# Patient Record
Sex: MALE | Race: WHITE | NOT HISPANIC OR LATINO | Employment: FULL TIME | URBAN - METROPOLITAN AREA
[De-identification: names, ages, dates, MRNs, and addresses within clinical notes are randomized per-mention and may not be internally consistent; named-entity substitution may affect disease eponyms.]

---

## 2017-01-20 ENCOUNTER — TRANSFERRED RECORDS (OUTPATIENT)
Dept: HEALTH INFORMATION MANAGEMENT | Facility: CLINIC | Age: 27
End: 2017-01-20

## 2017-02-16 ENCOUNTER — TRANSFERRED RECORDS (OUTPATIENT)
Dept: HEALTH INFORMATION MANAGEMENT | Facility: CLINIC | Age: 27
End: 2017-02-16

## 2017-03-12 ENCOUNTER — HOSPITAL ENCOUNTER (EMERGENCY)
Facility: CLINIC | Age: 27
Discharge: HOME | End: 2017-03-12
Attending: EMERGENCY MEDICINE | Admitting: EMERGENCY MEDICINE
Payer: COMMERCIAL

## 2017-03-12 ENCOUNTER — APPOINTMENT (OUTPATIENT)
Dept: CT IMAGING | Facility: CLINIC | Age: 27
End: 2017-03-12
Attending: EMERGENCY MEDICINE
Payer: COMMERCIAL

## 2017-03-12 ENCOUNTER — APPOINTMENT (OUTPATIENT)
Dept: GENERAL RADIOLOGY | Facility: CLINIC | Age: 27
End: 2017-03-12
Attending: EMERGENCY MEDICINE
Payer: COMMERCIAL

## 2017-03-12 VITALS
SYSTOLIC BLOOD PRESSURE: 141 MMHG | RESPIRATION RATE: 16 BRPM | HEIGHT: 73 IN | TEMPERATURE: 99.6 F | WEIGHT: 168 LBS | DIASTOLIC BLOOD PRESSURE: 74 MMHG | BODY MASS INDEX: 22.26 KG/M2 | OXYGEN SATURATION: 98 % | HEART RATE: 87 BPM

## 2017-03-12 DIAGNOSIS — R07.89 CHEST DISCOMFORT: ICD-10-CM

## 2017-03-12 DIAGNOSIS — R51.9 ACUTE NONINTRACTABLE HEADACHE, UNSPECIFIED HEADACHE TYPE: ICD-10-CM

## 2017-03-12 DIAGNOSIS — M43.6 STIFF NECK: ICD-10-CM

## 2017-03-12 LAB
ALBUMIN SERPL-MCNC: 4 G/DL (ref 3.4–5)
ALBUMIN UR-MCNC: 30 MG/DL
ALP SERPL-CCNC: 86 U/L (ref 40–150)
ALT SERPL W P-5'-P-CCNC: 24 U/L (ref 0–70)
ANION GAP SERPL CALCULATED.3IONS-SCNC: 10 MMOL/L (ref 3–14)
APPEARANCE UR: CLEAR
AST SERPL W P-5'-P-CCNC: 11 U/L (ref 0–45)
BASOPHILS # BLD AUTO: 0 10E9/L (ref 0–0.2)
BASOPHILS NFR BLD AUTO: 0.9 %
BILIRUB SERPL-MCNC: 0.6 MG/DL (ref 0.2–1.3)
BILIRUB UR QL STRIP: NEGATIVE
BUN SERPL-MCNC: 13 MG/DL (ref 7–30)
CALCIUM SERPL-MCNC: 9.4 MG/DL (ref 8.5–10.1)
CHLORIDE SERPL-SCNC: 100 MMOL/L (ref 94–109)
CO2 SERPL-SCNC: 28 MMOL/L (ref 20–32)
COLOR UR AUTO: YELLOW
CREAT SERPL-MCNC: 0.99 MG/DL (ref 0.66–1.25)
D DIMER PPP FEU-MCNC: 1.2 UG/ML FEU (ref 0–0.5)
DIFFERENTIAL METHOD BLD: NORMAL
EOSINOPHIL # BLD AUTO: 0.1 10E9/L (ref 0–0.7)
EOSINOPHIL NFR BLD AUTO: 1.7 %
ERYTHROCYTE [DISTWIDTH] IN BLOOD BY AUTOMATED COUNT: 12.3 % (ref 10–15)
FLUAV+FLUBV AG SPEC QL: NEGATIVE
FLUAV+FLUBV AG SPEC QL: NORMAL
GFR SERPL CREATININE-BSD FRML MDRD: ABNORMAL ML/MIN/1.7M2
GLUCOSE SERPL-MCNC: 78 MG/DL (ref 70–99)
GLUCOSE UR STRIP-MCNC: NEGATIVE MG/DL
HCT VFR BLD AUTO: 43.1 % (ref 40–53)
HGB BLD-MCNC: 14.8 G/DL (ref 13.3–17.7)
HGB UR QL STRIP: NEGATIVE
INTERPRETATION ECG - MUSE: NORMAL
KETONES UR STRIP-MCNC: 40 MG/DL
LACTATE BLD-SCNC: 1.2 MMOL/L (ref 0.7–2.1)
LEUKOCYTE ESTERASE UR QL STRIP: NEGATIVE
LYMPHOCYTES # BLD AUTO: 1.2 10E9/L (ref 0.8–5.3)
LYMPHOCYTES NFR BLD AUTO: 24.3 %
MCH RBC QN AUTO: 29.7 PG (ref 26.5–33)
MCHC RBC AUTO-ENTMCNC: 34.3 G/DL (ref 31.5–36.5)
MCV RBC AUTO: 86 FL (ref 78–100)
MONOCYTES # BLD AUTO: 1 10E9/L (ref 0–1.3)
MONOCYTES NFR BLD AUTO: 19.1 %
MUCOUS THREADS #/AREA URNS LPF: PRESENT /LPF
NEUTROPHILS # BLD AUTO: 2.8 10E9/L (ref 1.6–8.3)
NEUTROPHILS NFR BLD AUTO: 54 %
NITRATE UR QL: NEGATIVE
PH UR STRIP: 6 PH (ref 5–7)
PLATELET # BLD AUTO: 227 10E9/L (ref 150–450)
PLATELET # BLD EST: NORMAL 10*3/UL
POTASSIUM SERPL-SCNC: 3.2 MMOL/L (ref 3.4–5.3)
PROT SERPL-MCNC: 8.6 G/DL (ref 6.8–8.8)
RBC # BLD AUTO: 4.99 10E12/L (ref 4.4–5.9)
RBC #/AREA URNS AUTO: <1 /HPF (ref 0–2)
RBC MORPH BLD: NORMAL
SODIUM SERPL-SCNC: 138 MMOL/L (ref 133–144)
SP GR UR STRIP: 1.03 (ref 1–1.03)
SPECIMEN SOURCE: NORMAL
URN SPEC COLLECT METH UR: ABNORMAL
UROBILINOGEN UR STRIP-MCNC: NORMAL MG/DL (ref 0–2)
WBC # BLD AUTO: 5.1 10E9/L (ref 4–11)
WBC #/AREA URNS AUTO: <1 /HPF (ref 0–2)

## 2017-03-12 PROCEDURE — 25000132 ZZH RX MED GY IP 250 OP 250 PS 637: Performed by: EMERGENCY MEDICINE

## 2017-03-12 PROCEDURE — 71260 CT THORAX DX C+: CPT

## 2017-03-12 PROCEDURE — 87804 INFLUENZA ASSAY W/OPTIC: CPT | Mod: 91 | Performed by: EMERGENCY MEDICINE

## 2017-03-12 PROCEDURE — 93010 ELECTROCARDIOGRAM REPORT: CPT | Mod: Z6 | Performed by: EMERGENCY MEDICINE

## 2017-03-12 PROCEDURE — 96360 HYDRATION IV INFUSION INIT: CPT | Performed by: EMERGENCY MEDICINE

## 2017-03-12 PROCEDURE — 80053 COMPREHEN METABOLIC PANEL: CPT | Performed by: EMERGENCY MEDICINE

## 2017-03-12 PROCEDURE — 99285 EMERGENCY DEPT VISIT HI MDM: CPT | Mod: 25 | Performed by: EMERGENCY MEDICINE

## 2017-03-12 PROCEDURE — 93005 ELECTROCARDIOGRAM TRACING: CPT | Performed by: EMERGENCY MEDICINE

## 2017-03-12 PROCEDURE — 71020 XR CHEST 2 VW: CPT

## 2017-03-12 PROCEDURE — 85379 FIBRIN DEGRADATION QUANT: CPT | Performed by: EMERGENCY MEDICINE

## 2017-03-12 PROCEDURE — 96361 HYDRATE IV INFUSION ADD-ON: CPT | Performed by: EMERGENCY MEDICINE

## 2017-03-12 PROCEDURE — 83605 ASSAY OF LACTIC ACID: CPT | Performed by: EMERGENCY MEDICINE

## 2017-03-12 PROCEDURE — 81001 URINALYSIS AUTO W/SCOPE: CPT | Performed by: EMERGENCY MEDICINE

## 2017-03-12 PROCEDURE — 25500064 ZZH RX 255 OP 636: Performed by: EMERGENCY MEDICINE

## 2017-03-12 PROCEDURE — 25000128 H RX IP 250 OP 636: Performed by: EMERGENCY MEDICINE

## 2017-03-12 PROCEDURE — 85025 COMPLETE CBC W/AUTO DIFF WBC: CPT | Performed by: EMERGENCY MEDICINE

## 2017-03-12 PROCEDURE — 96374 THER/PROPH/DIAG INJ IV PUSH: CPT | Mod: 59 | Performed by: EMERGENCY MEDICINE

## 2017-03-12 RX ORDER — ONDANSETRON 2 MG/ML
4 INJECTION INTRAMUSCULAR; INTRAVENOUS ONCE
Status: COMPLETED | OUTPATIENT
Start: 2017-03-12 | End: 2017-03-12

## 2017-03-12 RX ORDER — SODIUM CHLORIDE 9 MG/ML
INJECTION, SOLUTION INTRAVENOUS ONCE
Status: COMPLETED | OUTPATIENT
Start: 2017-03-12 | End: 2017-03-12

## 2017-03-12 RX ORDER — IOPAMIDOL 755 MG/ML
59 INJECTION, SOLUTION INTRAVASCULAR ONCE
Status: COMPLETED | OUTPATIENT
Start: 2017-03-12 | End: 2017-03-12

## 2017-03-12 RX ORDER — POTASSIUM CHLORIDE 20MEQ/15ML
60 LIQUID (ML) ORAL ONCE
Status: COMPLETED | OUTPATIENT
Start: 2017-03-12 | End: 2017-03-12

## 2017-03-12 RX ORDER — ONDANSETRON 2 MG/ML
4 INJECTION INTRAMUSCULAR; INTRAVENOUS
Status: COMPLETED | OUTPATIENT
Start: 2017-03-12 | End: 2017-03-12

## 2017-03-12 RX ORDER — IOPAMIDOL 755 MG/ML
60 INJECTION, SOLUTION INTRAVASCULAR ONCE
Status: DISCONTINUED | OUTPATIENT
Start: 2017-03-12 | End: 2017-03-13 | Stop reason: HOSPADM

## 2017-03-12 RX ORDER — ONDANSETRON 4 MG/1
4 TABLET, ORALLY DISINTEGRATING ORAL EVERY 8 HOURS PRN
Qty: 10 TABLET | Refills: 0 | Status: SHIPPED | OUTPATIENT
Start: 2017-03-12 | End: 2017-03-15

## 2017-03-12 RX ADMIN — SODIUM CHLORIDE, PRESERVATIVE FREE 90 ML: 5 INJECTION INTRAVENOUS at 21:21

## 2017-03-12 RX ADMIN — ONDANSETRON 4 MG: 2 INJECTION INTRAMUSCULAR; INTRAVENOUS at 14:29

## 2017-03-12 RX ADMIN — ONDANSETRON 4 MG: 2 INJECTION INTRAMUSCULAR; INTRAVENOUS at 18:39

## 2017-03-12 RX ADMIN — IOPAMIDOL 59 ML: 755 INJECTION, SOLUTION INTRAVENOUS at 21:20

## 2017-03-12 RX ADMIN — SODIUM CHLORIDE 1000 ML: 9 INJECTION, SOLUTION INTRAVENOUS at 18:30

## 2017-03-12 RX ADMIN — SODIUM CHLORIDE 1000 ML: 9 INJECTION, SOLUTION INTRAVENOUS at 14:29

## 2017-03-12 RX ADMIN — Medication 60 MEQ: at 18:30

## 2017-03-12 ASSESSMENT — ENCOUNTER SYMPTOMS
CHILLS: 1
NAUSEA: 1
APPETITE CHANGE: 1
NECK STIFFNESS: 1
CHEST TIGHTNESS: 1
HEADACHES: 1
DIAPHORESIS: 1
VOMITING: 0

## 2017-03-12 NOTE — ED NOTES
Alert orientated ambulatory patient presents to ER triage with c/o:      1.) Stiff neck - onset three days ago  2.) Chest pain - onset two days ago  3.) Headache/Nausea -onset two days ago.    Hx: Recent dx of Ulcerative Colitis.  Stated new medication.      Airway WDLs  Breathing WDLs  Circulation WDLs

## 2017-03-12 NOTE — ED PROVIDER NOTES
History     Chief Complaint   Patient presents with     Generalized Body Aches     Torticollis     HPI  Kyle Coelho is a 26 year old male who presents to the Emergency Department with multiple complaints. Patient states that 4 days ago his neck felt stiff. He reports describes his stiffness as an inability to turn his head or move his shoulders. He reports a sore jaw from when he tried to eat food and had to chew without moving his neck. Patient reports that he then developed chest discomfort, headache, loss of appetite, and nausea. He states that his headache was worse this morning and was throbbing. He states that he thought he was going to lose consciousness because of this headache. He did not lose consciousness. He describes the chest discomfort as a tightness when he takes deep breaths. Patient states that because of his nausea he has been unable to eat. He has not had any episodes of vomiting. The patient states that he has had chills and night sweats but has not taken his temperature at home. Patient states that last night he awoke at 1:45 AM and called for an emergency flight home.     He states that he has had abdominal pain but attributes this to recent diagnosis of ulcerative colitis 1 month ago. He states that he is on Lialda for this. He currently does not have abdominal pain. He denies dysuria. No ear pain. The patient has not tried any medication for his symptoms.      I have reviewed the Medications, Allergies, Past Medical and Surgical History, and Social History in the [facility] system.    PAST MEDICAL HISTORY  History reviewed. No pertinent past medical history.  PAST SURGICAL HISTORY  History reviewed. No pertinent past surgical history.  FAMILY HISTORY  No family history on file.  SOCIAL HISTORY  Social History   Substance Use Topics     Smoking status: Never Smoker     Smokeless tobacco: Not on file     Alcohol use No     MEDICATIONS  Current Facility-Administered Medications   Medication  "    0.9% sodium chloride BOLUS    Followed by     0.9% sodium chloride BOLUS     iopamidol (ISOVUE-370) solution 60 mL     Current Outpatient Prescriptions   Medication     Mesalamine (LIALDA PO)     ondansetron (ZOFRAN ODT) 4 MG ODT tab     ALLERGIES  No Known Allergies     Review of Systems   Constitutional: Positive for appetite change (decreased), chills and diaphoresis (night).   HENT: Negative for ear pain.    Respiratory: Positive for chest tightness.    Gastrointestinal: Positive for nausea. Negative for vomiting.   Musculoskeletal: Positive for neck stiffness.   Neurological: Positive for headaches.   All other systems reviewed and are negative.      Physical Exam   BP: (!) 139/97  Heart Rate: 96  Temp: 98.5  F (36.9  C)  Height: 185.4 cm (6' 1\")  Weight: 76.2 kg (168 lb)  SpO2: 98 %  Physical Exam   Constitutional: He appears well-developed and well-nourished. No distress.   Eyes: Right eye exhibits no discharge. Left eye exhibits no discharge.   Neck: Normal range of motion. Neck supple. No tracheal tenderness present. No rigidity. Normal range of motion present. No thyroid mass present.   Cardiovascular: Normal rate.    No murmur heard.  Pulmonary/Chest: Effort normal. No stridor. No respiratory distress. He has no wheezes. He exhibits no tenderness.   Abdominal: Soft. He exhibits no distension. There is no tenderness. There is no rebound.   Musculoskeletal: He exhibits no edema.   Neurological: He is alert. No cranial nerve deficit.   Skin: Skin is warm. He is not diaphoretic. No erythema.   Psychiatric: He has a normal mood and affect.       ED Course     Procedures         EKG Interpretation:      Interpreted by Jerica Dixon  Time reviewed: 1456  Symptoms at time of EKG: chest discomfort   Rhythm: normal sinus   Rate: normal  Axis: normal  ST Segments/ T Waves: Non-specific ST wave changes  Comparison to prior: nonr    Clinical Impression: non specific ST changes      Results for orders placed or " performed during the hospital encounter of 03/12/17 (from the past 24 hour(s))   CBC with platelets differential   Result Value Ref Range    WBC 5.1 4.0 - 11.0 10e9/L    RBC Count 4.99 4.4 - 5.9 10e12/L    Hemoglobin 14.8 13.3 - 17.7 g/dL    Hematocrit 43.1 40.0 - 53.0 %    MCV 86 78 - 100 fl    MCH 29.7 26.5 - 33.0 pg    MCHC 34.3 31.5 - 36.5 g/dL    RDW 12.3 10.0 - 15.0 %    Platelet Count 227 150 - 450 10e9/L    Diff Method Manual Differential     % Neutrophils 54.0 %    % Lymphocytes 24.3 %    % Monocytes 19.1 %    % Eosinophils 1.7 %    % Basophils 0.9 %    Absolute Neutrophil 2.8 1.6 - 8.3 10e9/L    Absolute Lymphocytes 1.2 0.8 - 5.3 10e9/L    Absolute Monocytes 1.0 0.0 - 1.3 10e9/L    Absolute Eosinophils 0.1 0.0 - 0.7 10e9/L    Absolute Basophils 0.0 0.0 - 0.2 10e9/L    RBC Morphology Normal     Platelet Estimate Confirming automated cell count    Comprehensive metabolic panel   Result Value Ref Range    Sodium 138 133 - 144 mmol/L    Potassium 3.2 (L) 3.4 - 5.3 mmol/L    Chloride 100 94 - 109 mmol/L    Carbon Dioxide 28 20 - 32 mmol/L    Anion Gap 10 3 - 14 mmol/L    Glucose 78 70 - 99 mg/dL    Urea Nitrogen 13 7 - 30 mg/dL    Creatinine 0.99 0.66 - 1.25 mg/dL    GFR Estimate >90  Non  GFR Calc   >60 mL/min/1.7m2    GFR Estimate If Black >90   GFR Calc   >60 mL/min/1.7m2    Calcium 9.4 8.5 - 10.1 mg/dL    Bilirubin Total 0.6 0.2 - 1.3 mg/dL    Albumin 4.0 3.4 - 5.0 g/dL    Protein Total 8.6 6.8 - 8.8 g/dL    Alkaline Phosphatase 86 40 - 150 U/L    ALT 24 0 - 70 U/L    AST 11 0 - 45 U/L   Lactic acid whole blood   Result Value Ref Range    Lactic Acid 1.2 0.7 - 2.1 mmol/L   D dimer quantitative   Result Value Ref Range    D Dimer 1.2 (H) 0.0 - 0.50 ug/ml FEU   Influenza A/B antigen swab   Result Value Ref Range    Influenza A/B Agn Specimen Nasopharyngeal     Influenza A Negative NEG    Influenza B  NEG     Negative   Test results must be correlated with clinical data. If  necessary, results   should be confirmed by a molecular assay or viral culture.     XR Chest 2 Views    Narrative    XR CHEST 2 VW  3/12/2017 2:50 PM      HISTORY: fever, diarrhea    COMPARISON: None available    FINDINGS:   The lungs and pleural spaces are clear. No focal airspace opacity. No  pleural effusion. The central airway is clear. No pneumothorax.     The heart and pulmonary vascularity appear unremarkable.    No acute bony abnormality.       Impression    IMPRESSION:   No acute cardiopulmonary abnormality.     I have personally reviewed the examination and initial interpretation  and I agree with the findings.    JEMIMA HERNANDEZ MD   EKG 12-lead, tracing only   Result Value Ref Range    Interpretation ECG Click View Image link to view waveform and result    Routine UA with microscopic   Result Value Ref Range    Color Urine Yellow     Appearance Urine Clear     Glucose Urine Negative NEG mg/dL    Bilirubin Urine Negative NEG    Ketones Urine 40 (A) NEG mg/dL    Specific Gravity Urine 1.031 1.003 - 1.035    Blood Urine Negative NEG    pH Urine 6.0 5.0 - 7.0 pH    Protein Albumin Urine 30 (A) NEG mg/dL    Urobilinogen mg/dL Normal 0.0 - 2.0 mg/dL    Nitrite Urine Negative NEG    Leukocyte Esterase Urine Negative NEG    Source Midstream Urine     WBC Urine <1 0 - 2 /HPF    RBC Urine <1 0 - 2 /HPF    Mucous Urine Present (A) NEG /LPF   Chest CT, IV contrast only - PE protocol    Narrative    Resident prelim:   1. No pulmonary embolus.  2. Several scattered groundglass nodules throughout the lungs which  are nonspecific though may be seen in the setting of infection or  inflammation.  3. Gynecomastia.       Assessments & Plan (with Medical Decision Making)   This is a 26 year old gentleman who is presenting to the ED with neck stiffness, headaches, and chest discomfort. In the setting of the headache, the patient is afebrile rectally and has had not received any ibuprofen or Tylenol (per him); so, my  concern for meningitis is very low. His labs are without any abnormalities other than his potassium which is low and will be treated.  His neck stiffness and jaw has improved per him. He reports that it feels much better now. ROM intact. He is able to eat ok. His tetanus is also up to date.  He does not have any headaches now.  Liver enzymes without concerns.    In the setting of chest discomfort; an EKG is obtained without major findings. D-dimer is obtained in this low risk patient and is positive: CT ordered.    Signing out pending the CT of the chest.  I have discuss this with the patient:   -If CT is negative: d/c home with Zofran for nausea.    Addendum: CT negative: d/c home with PCP follow up and strict return parameters.    We discussed the possibility that this could be related to the side effects of the Mesalamine. He has an appointment with his gastroenterologist this week and he will discuss this.    I have reviewed the nursing notes.    I have reviewed the findings, diagnosis, plan and need for follow up with the patient.  This part of the document was transcribed by Nehal Zamorano Medical Scribe.   New Prescriptions    ONDANSETRON (ZOFRAN ODT) 4 MG ODT TAB    Take 1 tablet (4 mg) by mouth every 8 hours as needed for nausea       Final diagnoses:   Stiff neck   Acute nonintractable headache, unspecified headache type   Chest discomfort     I, Marco Abel, am serving as a trained medical scribe to document services personally performed by Jerica Dixon MD, based on the provider's statements to me.      Jerica MINER MD, was physically present and have reviewed and verified the accuracy of this note documented by Marco Abel.       3/12/2017   [facility], [place], EMERGENCY DEPARTMENT         Jerica Dixon MD  03/12/17 6798

## 2017-03-12 NOTE — ED AVS SNAPSHOT
[facility], Emergency Department    [address] [address]    Phone:  [phone]                                       Kyle Coelho   MRN: [id number]    Department:  [facility], Emergency Department   Date of Visit:  3/12/2017           Patient Information     Date Of Birth          1990        Your diagnoses for this visit were:     Stiff neck     Acute nonintractable headache, unspecified headache type     Chest discomfort        You were seen by Jerica Dixon MD.      Follow-up Information     Follow up with Gabriel Ibrahim MD In 3 days.    Specialty:  Gastroenterology    Why:  As needed, If symptoms worsen    Contact information:    [facility]  [address] [place] [address] [phone]          Follow up with [facility], Emergency Department.    Specialty:  EMERGENCY MEDICINE    Why:  As needed, If symptoms worsen    Contact information:    [address] [address] [phone]    Additional information:    The [facility] is located on the corner of [address] and [address] on the [address]. It is easily accessible from virtually any point in the [place] area, via [employer] and [facility].      24 Hour Appointment Hotline       To make an appointment at any [facility], call [phone] ([phone]). If you don't have a family doctor or clinic, we will help you find one. [facility] are conveniently located to serve the needs of you and your family.             Review of your medicines      START taking        Dose / Directions Last dose taken    ondansetron 4 MG ODT tab   Commonly known as:  ZOFRAN ODT   Dose:  4 mg   Quantity:  10 tablet        Take 1 tablet (4 mg) by mouth every 8 hours as needed for nausea   Refills:  0          Our records show that you are taking the medicines listed below. If these are incorrect, please call your family doctor or clinic.      "   Dose / Directions Last dose taken    LIALDA PO   Dose:  1.2 mg        Take 1.2 mg by mouth   Refills:  0                Prescriptions were sent or printed at these locations (1 Prescription)                   Other Prescriptions                Printed at Department/Unit printer (1 of 1)         ondansetron (ZOFRAN ODT) 4 MG ODT tab                Procedures and tests performed during your visit     CBC with platelets differential    Chest CT, IV contrast only - PE protocol    Comprehensive metabolic panel    D dimer quantitative    EKG 12-lead, tracing only    Influenza A/B antigen swab    Lactic acid whole blood    Peripheral IV: Standard    Routine UA with microscopic    XR Chest 2 Views      Orders Needing Specimen Collection     None      Pending Results     Date and Time Order Name Status Description    3/12/2017 1918 Chest CT, IV contrast only - PE protocol Preliminary     3/12/2017 1422 EKG 12-lead, tracing only Preliminary             Pending Culture Results     No orders found from 3/10/2017 to 3/13/2017.            Thank you for choosing [facility]       Thank you for choosing [facility] for your care. Our goal is always to provide you with excellent care. Hearing back from our patients is one way we can continue to improve our services. Please take a few minutes to complete the written survey that you may receive in the mail after you visit with us. Thank you!        [employer] Information     [employer] lets you send messages to your doctor, view your test results, renew your prescriptions, schedule appointments and more. To sign up, go to [email or web address] . Click on \"Log in\" on the left side of the screen, which will take you to the Welcome page. Then click on \"Sign up Now\" on the right side of the page.     You will be asked to enter the access code listed below, as well as some personal information. Please follow the directions to create your username and password.     Your access code is: " [email or web address]  Expires: 6/10/2017 10:03 PM     Your access code will  in 90 days. If you need help or a new code, please call your [facility] or [phone].        Care EveryWhere ID     This is your Care EveryWhere ID. This could be used by other organizations to access your [facility] medical records  [id number]        After Visit Summary       This is your record. Keep this with you and show to your community pharmacist(s) and doctor(s) at your next visit.

## 2017-03-12 NOTE — ED AVS SNAPSHOT
[facility], [place], Emergency Department    [address] [address]    Phone:  [phone]                                       Kyle Coelho   MRN: [id number]    Department:  [facility], Emergency Department   Date of Visit:  3/12/2017           After Visit Summary Signature Page     I have received my discharge instructions, and my questions have been answered. I have discussed any challenges I see with this plan with the nurse or doctor.    ..........................................................................................................................................  Patient/Patient Representative Signature      ..........................................................................................................................................  Patient Representative Print Name and Relationship to Patient    ..................................................               ................................................  Date                                            Time    ..........................................................................................................................................  Reviewed by Signature/Title    ...................................................              ..............................................  Date                                                            Time

## 2017-03-17 ENCOUNTER — TRANSFERRED RECORDS (OUTPATIENT)
Dept: HEALTH INFORMATION MANAGEMENT | Facility: CLINIC | Age: 27
End: 2017-03-17

## 2017-04-17 ENCOUNTER — TRANSFERRED RECORDS (OUTPATIENT)
Dept: HEALTH INFORMATION MANAGEMENT | Facility: CLINIC | Age: 27
End: 2017-04-17

## 2017-04-21 ENCOUNTER — TRANSFERRED RECORDS (OUTPATIENT)
Dept: HEALTH INFORMATION MANAGEMENT | Facility: CLINIC | Age: 27
End: 2017-04-21

## 2017-06-08 ENCOUNTER — TRANSFERRED RECORDS (OUTPATIENT)
Dept: HEALTH INFORMATION MANAGEMENT | Facility: CLINIC | Age: 27
End: 2017-06-08

## 2017-10-16 NOTE — TELEPHONE ENCOUNTER
"APPT INFORMATION    Date /Time: 10/20/17 7:30AM    Reason for Appt: UC    Ref Provider/Clinic: JORGE ALTAMIRANO, JAYLA MCARE    Patient Contact (Y/N) & Call Details: No pt was referred.    Action: None     RECORDS CLINIC NAME  (\"None\" if no records ) RECEIVED RECS & IMG? Y/N   (may include other helpful notes)   Internal Clinics: None     External Clinics: JAYLA Ibrahim MD  Transferred recs scanned in Epic 1/2017- 6/8/2017  Colonoscopy & Upper EGD 2/16/17        "

## 2017-10-17 ENCOUNTER — TELEPHONE (OUTPATIENT)
Dept: GASTROENTEROLOGY | Facility: CLINIC | Age: 27
End: 2017-10-17

## 2017-10-20 ENCOUNTER — PRE VISIT (OUTPATIENT)
Dept: GASTROENTEROLOGY | Facility: CLINIC | Age: 27
End: 2017-10-20

## 2017-10-20 ENCOUNTER — OFFICE VISIT (OUTPATIENT)
Dept: GASTROENTEROLOGY | Facility: CLINIC | Age: 27
End: 2017-10-20

## 2017-10-20 VITALS
SYSTOLIC BLOOD PRESSURE: 136 MMHG | WEIGHT: 191.6 LBS | HEART RATE: 69 BPM | DIASTOLIC BLOOD PRESSURE: 79 MMHG | BODY MASS INDEX: 25.39 KG/M2 | OXYGEN SATURATION: 98 % | HEIGHT: 73 IN | TEMPERATURE: 98.1 F

## 2017-10-20 DIAGNOSIS — K51.311 CHRONIC ULCERATIVE RECTOSIGMOIDITIS WITH RECTAL BLEEDING (H): ICD-10-CM

## 2017-10-20 DIAGNOSIS — K51.311 ULCERATIVE RECTOSIGMOIDITIS WITH RECTAL BLEEDING (H): Primary | ICD-10-CM

## 2017-10-20 LAB
ALBUMIN SERPL-MCNC: 3.7 G/DL (ref 3.4–5)
ALP SERPL-CCNC: 68 U/L (ref 40–150)
ALT SERPL W P-5'-P-CCNC: 21 U/L (ref 0–70)
AST SERPL W P-5'-P-CCNC: 11 U/L (ref 0–45)
BASOPHILS # BLD AUTO: 0 10E9/L (ref 0–0.2)
BASOPHILS NFR BLD AUTO: 0.6 %
BILIRUB DIRECT SERPL-MCNC: 0.1 MG/DL (ref 0–0.2)
BILIRUB SERPL-MCNC: 0.3 MG/DL (ref 0.2–1.3)
CRP SERPL-MCNC: 6.6 MG/L (ref 0–8)
DIFFERENTIAL METHOD BLD: ABNORMAL
EOSINOPHIL # BLD AUTO: 0.2 10E9/L (ref 0–0.7)
EOSINOPHIL NFR BLD AUTO: 3.1 %
ERYTHROCYTE [DISTWIDTH] IN BLOOD BY AUTOMATED COUNT: 11.8 % (ref 10–15)
ERYTHROCYTE [SEDIMENTATION RATE] IN BLOOD BY WESTERGREN METHOD: 7 MM/H (ref 0–15)
HCT VFR BLD AUTO: 39.4 % (ref 40–53)
HGB BLD-MCNC: 13.1 G/DL (ref 13.3–17.7)
IMM GRANULOCYTES # BLD: 0 10E9/L (ref 0–0.4)
IMM GRANULOCYTES NFR BLD: 0.2 %
LYMPHOCYTES # BLD AUTO: 1.1 10E9/L (ref 0.8–5.3)
LYMPHOCYTES NFR BLD AUTO: 19.5 %
MCH RBC QN AUTO: 30.2 PG (ref 26.5–33)
MCHC RBC AUTO-ENTMCNC: 33.2 G/DL (ref 31.5–36.5)
MCV RBC AUTO: 91 FL (ref 78–100)
MONOCYTES # BLD AUTO: 0.5 10E9/L (ref 0–1.3)
MONOCYTES NFR BLD AUTO: 9 %
NEUTROPHILS # BLD AUTO: 3.7 10E9/L (ref 1.6–8.3)
NEUTROPHILS NFR BLD AUTO: 67.6 %
NRBC # BLD AUTO: 0 10*3/UL
NRBC BLD AUTO-RTO: 0 /100
PLATELET # BLD AUTO: 241 10E9/L (ref 150–450)
PROT SERPL-MCNC: 7 G/DL (ref 6.8–8.8)
RBC # BLD AUTO: 4.34 10E12/L (ref 4.4–5.9)
WBC # BLD AUTO: 5.4 10E9/L (ref 4–11)

## 2017-10-20 RX ORDER — SULFASALAZINE 500 MG/1
500 TABLET ORAL 4 TIMES DAILY
COMMUNITY
End: 2018-06-12

## 2017-10-20 ASSESSMENT — ENCOUNTER SYMPTOMS
INCREASED ENERGY: 1
MUSCLE CRAMPS: 0
BACK PAIN: 1
ARTHRALGIAS: 1
JAUNDICE: 0
WEIGHT LOSS: 0
POLYPHAGIA: 0
MUSCLE WEAKNESS: 1
BOWEL INCONTINENCE: 0
FLANK PAIN: 0
BLOOD IN STOOL: 1
VOMITING: 0
RECTAL BLEEDING: 1
FEVER: 0
DECREASED APPETITE: 0
STIFFNESS: 0
RECTAL PAIN: 1
DIARRHEA: 1
BLOATING: 1
FATIGUE: 1
HALLUCINATIONS: 0
CONSTIPATION: 1
CHILLS: 0
DYSURIA: 0
NAUSEA: 0
ABDOMINAL PAIN: 1
DIFFICULTY URINATING: 0
POLYDIPSIA: 0
ALTERED TEMPERATURE REGULATION: 0
NECK PAIN: 0
JOINT SWELLING: 0
NIGHT SWEATS: 0
MYALGIAS: 1
HEMATURIA: 0
HEARTBURN: 0
WEIGHT GAIN: 0

## 2017-10-20 ASSESSMENT — PAIN SCALES - GENERAL: PAINLEVEL: NO PAIN (0)

## 2017-10-20 NOTE — LETTER
10/20/2017       RE: Kyle Coelho  [id number]  [facility] [address]     Dear Colleague,    Thank you for referring your patient, Kyle Coelho, to the [facility] GASTROENTEROLOGY AND IBD CLINIC at [facility]. Please see a copy of my visit note below.    IBD CLINIC VISIT     CC/REFERRING MD:  Self referred  REASON FOR FOLLOW UP: Ulcerative colitis  COLLABORATING PHYSICIAN: Gordo Escobedo MD    IBD HISTORY  Age at diagnosis: 26, 2017  Extent of disease: Pancolitis  Current UC medications: Prednisone 2.5mg daily  Prior UC surgeries: None  Prior IBD Medications:   Lialda - flu like illness with fevers, abdominal pains  Apriso - flu like illlness  Sulfasalazine - did not control symptoms    DRUG MONITORING  TPMT enzyme activity: Has not been obtained    6-TGN/6-MMPN levels: --    Biologic concentration:--    DISEASE ASSESSMENT  Labs:  Lab Results   Component Value Date    ALBUMIN 4.0 03/12/2017   Endoscopic assessment: Colonoscopy on 02/16/2017 revealed continuous erythema throughout the entire colon and patchy inflammation within the ileum.  Biopsies in the duodenum did not show any evidence of celiac.  Biopsies from the terminal ileum showed nonspecific acute ileitis presumed to be backwash ileitis.  Random colon biopsies showed moderate active chronic colitis consistent with inflammatory bowel disease.   Enterography: --  Fecal calprotectin: --   C diff: Negative 10/2017  Maddox score: 8    ASSESSMENT/PLAN  A 27-year-old male with history of ulcerative pancolitis, currently on 0.5 mg of prednisone who is here to establish care in the Inflammatory Bowel Disease program.      1.  Ulcerative pancolitis.  The patient is clearly not symptomatically controlled on sulfasalazine and currently on his second prednisone taper.  At this time, we will start Uceris to see if patient tolerates this.  If the patient does not experience symptomatic relief, we will begin a  prednisone taper at 40 mg to be taken for 2 weeks and taper down to 30, 20, 15, 10, 5 and 5 every other day for 1 week.  We had a long discussion regarding maintenance medications, considering ulcerative pancolitis in a young male in addition to his current social situation, living in [place] until May and what may be most feasible and the safest option.  The patient will be reviewing the information that we discussed at today's visit to include infliximab, adalimumab and vedolizumab to be started in the near future  in addition to steroid therapy to get symptoms under better control.  The patient is currently favoring adalimumab self injections, as this allows for remote delivery when he is in [place] which I think is a reasonable option as I believe the patient would be reliable with adequate followup and proper laboratory surveillance.  The patient will be reviewing the information and will make a decision by next week regarding biologic therapies that we will be initiating.  The patient is concerned about insurance coverage; and ideally, he would like to start medication after the first of the year.  Given patient's significant symptoms, I would like to get maintenance medication on board as soon as possible at it may take some time for patient to respond; and ideally, I would like to keep the patient off steroids as soon as possible given that this is his second and even third prednisone taper if we need to go to prednisone if Uceris is not effective.   --  Start Uceris at 9 mg daily.   --  Laboratory studies today.   --  Stool studies when recently obtained in September, C. diff was negative.  We will defer stool studies today.   --  Pending biologic therapies, we will plan to enter adequate levels and for monitoring of any evidence of antibodies with drug monitoring.     2.  Joint pain.  This seems to be consistent with inflammatory bowel disease presenting as peripheral spondyloarthropathy.  As we get  better control of his inflammatory bowel disease, I think this will improve.  Other considerations would be autoimmune driven processes that we can evaluate if not improve once maintenance medications have been initiated.     3. IBD healthcare maintenance based on patients current medication:  Anti-TNF medication therapy vs Anti-integrin therapy  Vaccinations:  -- Influenza (every year): Last given 2017  -- TdaP (every 10 years): Last given 2015  -- Pneumococcal Pneumonia (once then every 5 years): Has not received  -- Yearly assessment for latent Tb (verbal screening and exam, PPD or QuantiFERON-Tb testing): Normal chest xray 3/12/17    One time confirmation of immunity or serologies:  -- Hepatitis A (serologies or immunizations): Hep A Ab, Total was nonreactive ([facility])  -- Hepatitis B (serologies or immunizations): immune by serologies ([facility])  -- Varicella: Had chickenpox as a child  -- MMR: Not documented  -- HPV (all aged 18-26): Not documented  -- Meningococcal meningitis (all patients at risk for meningitis): 2005   -- Due to the immunosuppression in this patient, I would not advise administration of live vaccines such as varicella/VZV, intranasal influenza, MMR, or yellow fever vaccine (if travelling).      Bone mineral density screening   -- Recommend all patients supplement with calcium and vitamin D  -- Given prior steroid use recommend DEXA if not already done    Cancer Screening:  Colon cancer screening:  Given pancolitis colonoscopy every 2-3 years recommended after 8 years of disease.  Dysplasia screening is recommended 2024.    Cervical cancer screening: N/A    Skin cancer screening: Annual visual exam of skin by dermatologist since patient is immunocompromised    Depression Screening:  -- Over the last month, have you felt down, depressed, or hopeless? No  -- Over the last month, have you felt little interest or pleasure doing things? No    Misc:  -- Avoid tobacco use  -- Avoid NSAIDs as there is  potentially a 25% chance of causing an IBD flare      RTC 4 weeks    Thank you for this consultation.  It was a pleasure to participate in the care of this patient; please contact us with any further questions.      Miguel Turner PA-C  Division of Gastroenterology, Hepatology and Nutrition  [facility]    HPI:   Kyle is a pleasant 27-year-old male who presents today with a history of ulcerative pancolitis for establishing care in the Inflammatory Bowel Disease program at the [facility].  The patient's history of symptoms regarding ulcerative colitis began in the fall of 2016.  At that time, he was out of the country in [place] for his job and noted more frequent stools and eventually realized that it had become more consistent with diarrhea, prompting evaluation by his primary care provider.  Stool studies were obtained that were negative; however, his diarrhea persisted.  Flagyl was started without improvement of symptoms.  Referral was made to [facility], and at that time, stool studies were again obtained, negative for infection and patient was scheduled for a colonoscopy due to ongoing symptoms.  Colonoscopy on 02/16/2017 revealed continuous erythema throughout the entire colon and patchy inflammation within the ileum.  Biopsies in the duodenum did not show any evidence of celiac.  Biopsies from the terminal ileum showed nonspecific acute ileitis presumed to be backwash ileitis.  Random colon biopsies showed moderate active chronic colitis consistent with inflammatory bowel disease.  He was started on Lialda 4 tablets daily beginning 02/18/2016.  He had some improvement initially, but approximately after a month he began to feel ill, developing fatigue, headache, more diarrhea and chills.  He eventually got to the point where he required to come home from his remote workplace in [place] and was seen at [facility] for further evaluation.  At that  "time, a CT chest was obtained showing several scattered ground-glass nodules throughout the lungs which are nonspecific, though may be seen in the setting of infection or inflammation.  When in the ED, his potassium was low so was replaced, and he also received IV fluids and sent home.  Influenza swab was deemed negative.  He decided to stop Lialda after this occurrence due to a question of intolerance.  After that time, he actually began to feel better after stopping the Lialda, in terms of the number of stools a day, however, still loose in consistency with some occasional bright red blood in stool.  He has been started on a prednisone taper starting 40 mg for 5 days, then 35 mg for 5 days and decreasing by 5 mg over an 8-week period.  He was then switched to Apriso with which he had the same symptoms he developed on Lialda and discontinued.  He was then tried on sulfasalazine 2 grams, which he is currently on, and did have some improvement of symptoms but is still having frequent stools and occasional blood in the stool.      He moved to [place] in June for his job.  He reports his first solid bowel movement occurring in June, but he still had primarily loose stools throughout the summer with occasional blood every other day up until September where he noticed more blood in the stool, more pain and feeling like his rectum was going to \"fall out.\"  He reached out to his primary care provider who actually restarted prednisone at 20 mg at the end of September with a taper to decrease 0.5 mg for 1 week after stool studies were obtained and were negative for infection.  He is currently on 0.5 mg per day in addition to continuing the sulfasalazine 2 grams per day.  With these flare of symptoms, he has discontinued of milk, coffee, alcohol and does not eat any fruits or vegetables as this causes significant worsening of symptoms.      He is currently having 5-7 bowel movements per day, 1-2 nighttime stools.  He has the " most significant episodes of tenesmus in the morning where he will spend approximately an hour and 15 minutes on the toilet before going to work.  He feels that the inflammation eventually calms down around lunch but does still require to have stools throughout the day.  Since being on prednisone since September, blood has decreased but still will see blood on occasion and still has significant tenesmus.  With regards to accidents, he had had 1 episode of losing bowel function overnight and was so exhausted with fatigue that he did not have energy to get up and change the sheets.  He has had significant urgency and hesitancy passing flatus.  He has also had significant fatigue.  He also notes specifically joint pain since he has been on prednisone in fingers and knees which is more improved now that he is on 0.5 mg, but he does note whenever he is on prednisone there is peripheral joint pain present.  He denies any NSAID use.  He denies any tobacco use.  He is currently living in [place] at this time and plans to move back to [place] around May permanently and is on location in [place] until that time.     ROS:    No fevers or chills  No weight loss  No blurry vision, double vision or change in vision  No sore throat  No lymphadenopathy  No headache, paraesthesias, or weakness in a limb  No shortness of breath or wheezing  No chest pain or pressure  No arthralgias or myalgias  No rashes or skin changes  No odynophagia or dysphagia  No BRBPR, hematochezia, melena  No dysuria, frequency or urgency  No hot/cold intolerance or polyria  No anxiety or depression    Extra intestinal manifestations of IBD:  No uveitis/episcleritis  No aphthous ulcers   No arthritis   No erythema nodosum/pyoderma gangrenosum.     PERTINENT PAST MEDICAL HISTORY:  See HPI    PREVIOUS SURGERIES:  None    ALLERGIES:   No Known Allergies    PERTINENT MEDICATIONS:    Current Outpatient Prescriptions:      sulfaSALAzine (AZULFIDINE) 500 MG  "tablet, Take 500 mg by mouth 4 times daily, Disp: , Rfl:      PREDNISONE PO, , Disp: , Rfl:     SOCIAL HISTORY:  Social History     Social History     Marital status: Single     Spouse name: N/A     Number of children: N/A     Years of education: N/A     Occupational History     Not on file.     Social History Main Topics     Smoking status: Never Smoker     Smokeless tobacco: Never Used     Alcohol use No     Drug use: No     Sexual activity: No     Other Topics Concern     Not on file     Social History Narrative     FAMILY HISTORY:  No CRC, no IBD  Past/family/social history reviewed and no changes    PHYSICAL EXAMINATION:  Constitutional: aaox3, cooperative, pleasant, not dyspneic/diaphoretic, no acute distress  Vitals reviewed: /79  Pulse 69  Temp 98.1  F (36.7  C) (Oral)  Ht 1.854 m (6' 1\")  Wt 86.9 kg (191 lb 9.6 oz)  SpO2 98%  BMI 25.28 kg/m2  Wt:   Wt Readings from Last 2 Encounters:   10/20/17 86.9 kg (191 lb 9.6 oz)   03/12/17 76.2 kg (168 lb)      Eyes: Sclera anicteric/injected  Ears/nose/mouth/throat: Normal oropharynx without ulcers or exudate, mucus membranes moist, hearing intact  Neck: supple, thyroid normal size  CV: No edema  Respiratory: Unlabored breathing  Lymph: No axillary, submandibular, supraclavicular or inguinal lymphadenopathy  Abd: Nondistended, +bs, no hepatosplenomegaly, nontender, no peritoneal signs  Skin: warm, perfused, no jaundice  Psych: Normal affect  MSK: Normal gait      PERTINENT STUDIES:  Most recent CBC:  Recent Labs   Lab Test  03/12/17   1418   WBC  5.1   HGB  14.8   HCT  43.1   PLT  227     Most recent hepatic panel:  Recent Labs   Lab Test  03/12/17   1418   ALT  24   AST  11     Most recent creatinine:  Recent Labs   Lab Test  03/12/17   1418   CR  0.99       Again, thank you for allowing me to participate in the care of your patient.      Sincerely,    Miguel Turner PA-C      "

## 2017-10-20 NOTE — PATIENT INSTRUCTIONS
It was a pleasure taking care of you today.  I've included a brief summary of our discussion and care plan from today's visit below.  Please review this information with your primary care provider.  ______________________________________________________________________    My recommendations are summarized as follows:    -- Start Uceris 9mg daily. You can stop prednisone once you start this  -- Labs today  -- Next endoscopic assessment: 3-4 months after you start maintenance medication  -- Patient with IBD we recommend supplementation vitamin D 1000 units daily and calcium 500 mg twice daily.  -- Vaccines/immunizations to be updated: pneumonia vaccine recommended  -- Yearly Dermatology visit for skin check while on immunosuppressive therapy    Return to GI Clinic in 4 weeks to review your progress.    ______________________________________________________________________    Who do I call with any questions after my visit?  Please be in touch if there are any further questions that arise following today's visit.  There are multiple ways to contact your gastroenterology care team.        During business hours, you may reach a Gastroenterology nurse at [phone].       To schedule or reschedule an appointment, please call [phone].       You can always send a secure message through [employer].  [employer] messages are answered by your nurse or doctor typically within 24 hours.  Please allow extra time on weekends and holidays.        For urgent/emergent questions after business hours, you may reach the on-call GI Fellow by contacting the [facility] at [phone].     How will I get the results of any tests ordered?    You will receive all of your results.  If you have signed up for [employer], any tests ordered at your visit will be available to you after your physician reviews them.  Typically this takes 1-2 weeks.  If there are urgent results that require a change in your care plan, your physician  or nurse will call you to discuss the next steps.      What is [employer]?  [employer] is a secure way for you to access all of your healthcare records from the [facility].  It is a web based computer program, so you can sign on to it from any location.  It also allows you to send secure messages to your care team.  I recommend signing up for [employer] access if you have not already done so and are comfortable with using a computer.      How to I schedule a follow-up visit?  If you did not schedule a follow-up visit today, please call [phone] to schedule a follow-up office visit.        Sincerely,    Miguel Turner PA-C  [facility]  Division of Gastroenterology

## 2017-10-20 NOTE — PROGRESS NOTES
IBD CLINIC VISIT     CC/REFERRING MD:  Self referred  REASON FOR FOLLOW UP: Ulcerative colitis  COLLABORATING PHYSICIAN: Gordo Escobedo MD    IBD HISTORY  Age at diagnosis: 26, 2017  Extent of disease: Pancolitis  Current UC medications: Prednisone 2.5mg daily  Prior UC surgeries: None  Prior IBD Medications:   Lialda - flu like illness with fevers, abdominal pains  Apriso - flu like illlness  Sulfasalazine - did not control symptoms    DRUG MONITORING  TPMT enzyme activity: Has not been obtained    6-TGN/6-MMPN levels: --    Biologic concentration:--    DISEASE ASSESSMENT  Labs:  Lab Results   Component Value Date    ALBUMIN 4.0 03/12/2017   Endoscopic assessment: Colonoscopy on 02/16/2017 revealed continuous erythema throughout the entire colon and patchy inflammation within the ileum.  Biopsies in the duodenum did not show any evidence of celiac.  Biopsies from the terminal ileum showed nonspecific acute ileitis presumed to be backwash ileitis.  Random colon biopsies showed moderate active chronic colitis consistent with inflammatory bowel disease.   Enterography: --  Fecal calprotectin: --   C diff: Negative 10/2017  Maddox score: 8    ASSESSMENT/PLAN  A 27-year-old male with history of ulcerative pancolitis, currently on 0.5 mg of prednisone who is here to establish care in the Inflammatory Bowel Disease program.      1.  Ulcerative pancolitis.  The patient is clearly not symptomatically controlled on sulfasalazine and currently on his second prednisone taper.  At this time, we will start Uceris to see if patient tolerates this.  If the patient does not experience symptomatic relief, we will begin a prednisone taper at 40 mg to be taken for 2 weeks and taper down to 30, 20, 15, 10, 5 and 5 every other day for 1 week.  We had a long discussion regarding maintenance medications, considering ulcerative pancolitis in a young male in addition to his current social situation, living in [place] until May and what may be  most feasible and the safest option.  The patient will be reviewing the information that we discussed at today's visit to include infliximab, adalimumab and vedolizumab to be started in the near future  in addition to steroid therapy to get symptoms under better control.  The patient is currently favoring adalimumab self injections, as this allows for remote delivery when he is in [place] which I think is a reasonable option as I believe the patient would be reliable with adequate followup and proper laboratory surveillance.  The patient will be reviewing the information and will make a decision by next week regarding biologic therapies that we will be initiating.  The patient is concerned about insurance coverage; and ideally, he would like to start medication after the first of the year.  Given patient's significant symptoms, I would like to get maintenance medication on board as soon as possible at it may take some time for patient to respond; and ideally, I would like to keep the patient off steroids as soon as possible given that this is his second and even third prednisone taper if we need to go to prednisone if Uceris is not effective.   --  Start Uceris at 9 mg daily.   --  Laboratory studies today.   --  Stool studies when recently obtained in September, C. diff was negative.  We will defer stool studies today.   --  Pending biologic therapies, we will plan to enter adequate levels and for monitoring of any evidence of antibodies with drug monitoring.     2.  Joint pain.  This seems to be consistent with inflammatory bowel disease presenting as peripheral spondyloarthropathy.  As we get better control of his inflammatory bowel disease, I think this will improve.  Other considerations would be autoimmune driven processes that we can evaluate if not improve once maintenance medications have been initiated.     3. IBD healthcare maintenance based on patients current medication:  Anti-TNF medication therapy vs  Anti-integrin therapy  Vaccinations:  -- Influenza (every year): Last given 2017  -- TdaP (every 10 years): Last given 2015  -- Pneumococcal Pneumonia (once then every 5 years): Has not received  -- Yearly assessment for latent Tb (verbal screening and exam, PPD or QuantiFERON-Tb testing): Normal chest xray 3/12/17    One time confirmation of immunity or serologies:  -- Hepatitis A (serologies or immunizations): Hep A Ab, Total was nonreactive ([facility])  -- Hepatitis B (serologies or immunizations): immune by serologies ([facility])  -- Varicella: Had chickenpox as a child  -- MMR: Not documented  -- HPV (all aged 18-26): Not documented  -- Meningococcal meningitis (all patients at risk for meningitis): 2005   -- Due to the immunosuppression in this patient, I would not advise administration of live vaccines such as varicella/VZV, intranasal influenza, MMR, or yellow fever vaccine (if travelling).      Bone mineral density screening   -- Recommend all patients supplement with calcium and vitamin D  -- Given prior steroid use recommend DEXA if not already done    Cancer Screening:  Colon cancer screening:  Given pancolitis colonoscopy every 2-3 years recommended after 8 years of disease.  Dysplasia screening is recommended 2024.    Cervical cancer screening: N/A    Skin cancer screening: Annual visual exam of skin by dermatologist since patient is immunocompromised    Depression Screening:  -- Over the last month, have you felt down, depressed, or hopeless? No  -- Over the last month, have you felt little interest or pleasure doing things? No    Misc:  -- Avoid tobacco use  -- Avoid NSAIDs as there is potentially a 25% chance of causing an IBD flare      RTC 4 weeks    Thank you for this consultation.  It was a pleasure to participate in the care of this patient; please contact us with any further questions.      Miguel Turner PA-C  Division of Gastroenterology, Hepatology and Nutrition  [facility]    HPI:   Kyle  is a pleasant 27-year-old male who presents today with a history of ulcerative pancolitis for establishing care in the Inflammatory Bowel Disease program at the [facility].  The patient's history of symptoms regarding ulcerative colitis began in the fall of 2016.  At that time, he was out of the country in [place] for his job and noted more frequent stools and eventually realized that it had become more consistent with diarrhea, prompting evaluation by his primary care provider.  Stool studies were obtained that were negative; however, his diarrhea persisted.  Flagyl was started without improvement of symptoms.  Referral was made to [facility], and at that time, stool studies were again obtained, negative for infection and patient was scheduled for a colonoscopy due to ongoing symptoms.  Colonoscopy on 02/16/2017 revealed continuous erythema throughout the entire colon and patchy inflammation within the ileum.  Biopsies in the duodenum did not show any evidence of celiac.  Biopsies from the terminal ileum showed nonspecific acute ileitis presumed to be backwash ileitis.  Random colon biopsies showed moderate active chronic colitis consistent with inflammatory bowel disease.  He was started on Lialda 4 tablets daily beginning 02/18/2016.  He had some improvement initially, but approximately after a month he began to feel ill, developing fatigue, headache, more diarrhea and chills.  He eventually got to the point where he required to come home from his remote workplace in [place] and was seen at [facility] Department for further evaluation.  At that time, a CT chest was obtained showing several scattered ground-glass nodules throughout the lungs which are nonspecific, though may be seen in the setting of infection or inflammation.  When in the ED, his potassium was low so was replaced, and he also received IV fluids and sent home.  Influenza swab was deemed negative.  He  "decided to stop Lialda after this occurrence due to a question of intolerance.  After that time, he actually began to feel better after stopping the Lialda, in terms of the number of stools a day, however, still loose in consistency with some occasional bright red blood in stool.  He has been started on a prednisone taper starting 40 mg for 5 days, then 35 mg for 5 days and decreasing by 5 mg over an 8-week period.  He was then switched to Apriso with which he had the same symptoms he developed on Lialda and discontinued.  He was then tried on sulfasalazine 2 grams, which he is currently on, and did have some improvement of symptoms but is still having frequent stools and occasional blood in the stool.      He moved to [place] in June for his job.  He reports his first solid bowel movement occurring in June, but he still had primarily loose stools throughout the summer with occasional blood every other day up until September where he noticed more blood in the stool, more pain and feeling like his rectum was going to \"fall out.\"  He reached out to his primary care provider who actually restarted prednisone at 20 mg at the end of September with a taper to decrease 0.5 mg for 1 week after stool studies were obtained and were negative for infection.  He is currently on 0.5 mg per day in addition to continuing the sulfasalazine 2 grams per day.  With these flare of symptoms, he has discontinued of milk, coffee, alcohol and does not eat any fruits or vegetables as this causes significant worsening of symptoms.      He is currently having 5-7 bowel movements per day, 1-2 nighttime stools.  He has the most significant episodes of tenesmus in the morning where he will spend approximately an hour and 15 minutes on the toilet before going to work.  He feels that the inflammation eventually calms down around lunch but does still require to have stools throughout the day.  Since being on prednisone since September, blood has " decreased but still will see blood on occasion and still has significant tenesmus.  With regards to accidents, he had had 1 episode of losing bowel function overnight and was so exhausted with fatigue that he did not have energy to get up and change the sheets.  He has had significant urgency and hesitancy passing flatus.  He has also had significant fatigue.  He also notes specifically joint pain since he has been on prednisone in fingers and knees which is more improved now that he is on 0.5 mg, but he does note whenever he is on prednisone there is peripheral joint pain present.  He denies any NSAID use.  He denies any tobacco use.  He is currently living in [place] at this time and plans to move back to [place] around May permanently and is on location in [place] until that time.     ROS:    No fevers or chills  No weight loss  No blurry vision, double vision or change in vision  No sore throat  No lymphadenopathy  No headache, paraesthesias, or weakness in a limb  No shortness of breath or wheezing  No chest pain or pressure  No arthralgias or myalgias  No rashes or skin changes  No odynophagia or dysphagia  No BRBPR, hematochezia, melena  No dysuria, frequency or urgency  No hot/cold intolerance or polyria  No anxiety or depression    Extra intestinal manifestations of IBD:  No uveitis/episcleritis  No aphthous ulcers   No arthritis   No erythema nodosum/pyoderma gangrenosum.     PERTINENT PAST MEDICAL HISTORY:  See HPI    PREVIOUS SURGERIES:  None    ALLERGIES:   No Known Allergies    PERTINENT MEDICATIONS:    Current Outpatient Prescriptions:      sulfaSALAzine (AZULFIDINE) 500 MG tablet, Take 500 mg by mouth 4 times daily, Disp: , Rfl:      PREDNISONE PO, , Disp: , Rfl:     SOCIAL HISTORY:  Social History     Social History     Marital status: Single     Spouse name: N/A     Number of children: N/A     Years of education: N/A     Occupational History     Not on file.     Social History Main Topics      "Smoking status: Never Smoker     Smokeless tobacco: Never Used     Alcohol use No     Drug use: No     Sexual activity: No     Other Topics Concern     Not on file     Social History Narrative     FAMILY HISTORY:  No CRC, no IBD  Past/family/social history reviewed and no changes    PHYSICAL EXAMINATION:  Constitutional: aaox3, cooperative, pleasant, not dyspneic/diaphoretic, no acute distress  Vitals reviewed: /79  Pulse 69  Temp 98.1  F (36.7  C) (Oral)  Ht 1.854 m (6' 1\")  Wt 86.9 kg (191 lb 9.6 oz)  SpO2 98%  BMI 25.28 kg/m2  Wt:   Wt Readings from Last 2 Encounters:   10/20/17 86.9 kg (191 lb 9.6 oz)   03/12/17 76.2 kg (168 lb)      Eyes: Sclera anicteric/injected  Ears/nose/mouth/throat: Normal oropharynx without ulcers or exudate, mucus membranes moist, hearing intact  Neck: supple, thyroid normal size  CV: No edema  Respiratory: Unlabored breathing  Lymph: No axillary, submandibular, supraclavicular or inguinal lymphadenopathy  Abd: Nondistended, +bs, no hepatosplenomegaly, nontender, no peritoneal signs  Skin: warm, perfused, no jaundice  Psych: Normal affect  MSK: Normal gait      PERTINENT STUDIES:  Most recent CBC:  Recent Labs   Lab Test  03/12/17   1418   WBC  5.1   HGB  14.8   HCT  43.1   PLT  227     Most recent hepatic panel:  Recent Labs   Lab Test  03/12/17   1418   ALT  24   AST  11     Most recent creatinine:  Recent Labs   Lab Test  03/12/17   1418   CR  0.99             Answers for HPI/ROS submitted by the patient on 10/20/2017   General Symptoms: Yes  Skin Symptoms: No  HENT Symptoms: No  EYE SYMPTOMS: No  HEART SYMPTOMS: No  LUNG SYMPTOMS: No  INTESTINAL SYMPTOMS: Yes  URINARY SYMPTOMS: Yes  REPRODUCTIVE SYMPTOMS: No  SKELETAL SYMPTOMS: Yes  BLOOD SYMPTOMS: No  NERVOUS SYSTEM SYMPTOMS: No  MENTAL HEALTH SYMPTOMS: No  Fever: No  Loss of appetite: No  Weight loss: No  Weight gain: No  Fatigue: Yes  Night sweats: No  Chills: No  Increased stress: No  Excessive hunger: No  Excessive " thirst: No  Feeling hot or cold when others believe the temperature is normal: No  Loss of height: No  Post-operative complications: No  Surgical site pain: No  Hallucinations: No  Change in or Loss of Energy: Yes  Hyperactivity: No  Confusion: No  Heart burn or indigestion: No  Nausea: No  Vomiting: No  Abdominal pain: Yes  Bloating: Yes  Constipation: Yes  Diarrhea: Yes  Blood in stool: Yes  Black stools: No  Rectal or Anal pain: Yes  Fecal incontinence: No  Rectal bleeding: Yes  Yellowing of skin or eyes: No  Vomit with blood: No  Change in stools: No  Hemorrhoids: No  Trouble holding urine or incontinence: Yes  Pain or burning: No  Trouble starting or stopping: Yes  Increased frequency of urination: Yes  Blood in urine: No  Decreased frequency of urination: No  Frequent nighttime urination: No  Flank pain: No  Difficulty emptying bladder: No  Back pain: Yes  Muscle aches: Yes  Neck pain: No  Swollen joints: No  Joint pain: Yes  Bone pain: No  Muscle cramps: No  Muscle weakness: Yes  Joint stiffness: No  Bone fracture: No

## 2017-10-20 NOTE — MR AVS SNAPSHOT
After Visit Summary   10/20/2017    Kyle Coelho    MRN: [id number]           Patient Information     Date Of Birth          1990        Visit Information        Provider Department      10/20/2017 7:30 AM Miguel Turner PA-C [facility]        Today's Diagnoses     Ulcerative rectosigmoiditis with rectal bleeding (H)    -  1    Chronic ulcerative rectosigmoiditis with rectal bleeding (H)          Care Instructions    It was a pleasure taking care of you today.  I've included a brief summary of our discussion and care plan from today's visit below.  Please review this information with your primary care provider.  ______________________________________________________________________    My recommendations are summarized as follows:    -- Start Uceris 9mg daily. You can stop prednisone once you start this  -- Labs today  -- Next endoscopic assessment: 3-4 months after you start maintenance medication  -- Patient with IBD we recommend supplementation vitamin D 1000 units daily and calcium 500 mg twice daily.  -- Vaccines/immunizations to be updated: pneumonia vaccine recommended  -- Yearly Dermatology visit for skin check while on immunosuppressive therapy    Return to GI Clinic in 4 weeks to review your progress.    ______________________________________________________________________    Who do I call with any questions after my visit?  Please be in touch if there are any further questions that arise following today's visit.  There are multiple ways to contact your gastroenterology care team.        During business hours, you may reach a Gastroenterology nurse at [phone].       To schedule or reschedule an appointment, please call [phone].       You can always send a secure message through [facility].  [facility] messages are answered by your nurse or doctor typically within 24 hours.  Please allow extra time on weekends and holidays.        For  urgent/emergent questions after business hours, you may reach the on-call GI Fellow by contacting the [facility]  at [phone].     How will I get the results of any tests ordered?    You will receive all of your results.  If you have signed up for EduRise[employer], any tests ordered at your visit will be available to you after your physician reviews them.  Typically this takes 1-2 weeks.  If there are urgent results that require a change in your care plan, your physician or nurse will call you to discuss the next steps.      What is moneymeets?  moneymeets is a secure way for you to access all of your healthcare records from the [facility].  It is a web based computer program, so you can sign on to it from any location.  It also allows you to send secure messages to your care team.  I recommend signing up for moneymeets access if you have not already done so and are comfortable with using a computer.      How to I schedule a follow-up visit?  If you did not schedule a follow-up visit today, please call [phone] to schedule a follow-up office visit.        Sincerely,    Miguel Turner PA-C  [facility]  Division of Gastroenterology                Follow-ups after your visit        Follow-up notes from your care team     Return in about 4 weeks (around 11/17/2017).      Future tests that were ordered for you today     Open Future Orders        Priority Expected Expires Ordered    CRP inflammation [XMS9571] Routine 10/20/2017 12/19/2017 10/20/2017    Erythrocyte sedimentation rate auto [TTT821] Routine 10/20/2017 12/19/2017 10/20/2017    CBC with platelets differential [VUX938] Routine 10/20/2017 12/19/2017 10/20/2017    Hepatic panel [LAB20] Routine 10/20/2017 12/19/2017 10/20/2017            Who to contact     Please call your clinic at [phone] to:    Ask questions about your health    Make or cancel appointments    Discuss your medicines    Learn about your test results    Speak  "to your doctor   If you have compliments or concerns about an experience at your clinic, or if you wish to file a complaint, please contact [facility] Patient Relations at [phone] or email us at [email or web address]         Additional Information About Your Visit        QoniacharReliSen Information     Calvin is an electronic gateway that provides easy, online access to your medical records. With Calvin, you can request a clinic appointment, read your test results, renew a prescription or communicate with your care team.     To sign up for Calvin visit the website at [email or web address]   You will be asked to enter the access code listed below, as well as some personal information. Please follow the directions to create your username and password.     Your access code is: [email or web address]  Expires: 2018  6:31 AM     Your access code will  in 90 days. If you need help or a new code, please contact your [facility] or call [phone] for assistance.        Care EveryWhere ID     This is your Care EveryWhere ID. This could be used by other organizations to access your [facility] medical records  [id number]        Your Vitals Were     Pulse Temperature Height Pulse Oximetry BMI (Body Mass Index)       69 98.1  F (36.7  C) (Oral) 1.854 m (6' 1\") 98% 25.28 kg/m2        Blood Pressure from Last 3 Encounters:   10/20/17 136/79   17 141/74    Weight from Last 3 Encounters:   10/20/17 86.9 kg (191 lb 9.6 oz)   17 76.2 kg (168 lb)                 Today's Medication Changes          These changes are accurate as of: 10/20/17  8:43 AM.  If you have any questions, ask your nurse or doctor.               Start taking these medicines.        Dose/Directions    budesonide 9 MG 24 hr tablet   Commonly known as:  UCERIS   Used for:  Ulcerative rectosigmoiditis with rectal bleeding (H)   Started by:  Miguel Turner PA-C        Dose:  9 mg "   Take 1 tablet (9 mg) by mouth every morning   Quantity:  90 tablet   Refills:  3            Where to get your medicines      These medications were sent to [place], [place] - [address]  [address], [facility] [address]    Hours:  TRANSPLANT PHONE NUMBER [phone] Phone:  [phone]     budesonide 9 MG 24 hr tablet                Primary Care Provider Office Phone # Fax #    Gabriel Ibrahim [phone]       [facility] [address] [place] [address]        Equal Access to Services     [facility]: Hadii aad ku hadasho Soomaali, waaxda luqadaha, qaybta kaalmada adeegyada, waxteresa garcia hayberhanen oscar white . So [facility] [phone].    ATENCIÓN: Si habla español, tiene a mabry disposición servicios gratuitos de asistencia lingüística. Llame al [phone].    We comply with applicable federal civil rights laws and [place] laws. We do not discriminate on the basis of race, color, national origin, age, disability, sex, sexual orientation, or gender identity.            Thank you!     Thank you for choosing [facility]  for your care. Our goal is always to provide you with excellent care. Hearing back from our patients is one way we can continue to improve our services. Please take a few minutes to complete the written survey that you may receive in the mail after your visit with us. Thank you!             Your Updated Medication List - Protect others around you: Learn how to safely use, store and throw away your medicines at www.disposemymeds.org.          This list is accurate as of: 10/20/17  8:43 AM.  Always use your most recent med list.                   Brand Name Dispense Instructions for use Diagnosis    budesonide 9 MG 24 hr tablet    UCERIS    90 tablet    Take 1 tablet (9 mg) by mouth every morning    Ulcerative rectosigmoiditis with rectal bleeding (H)        PREDNISONE PO           sulfaSALAzine 500 MG tablet    AZULFIDINE     Take 500 mg by mouth 4 times daily

## 2017-10-21 ENCOUNTER — MYC MEDICAL ADVICE (OUTPATIENT)
Dept: GASTROENTEROLOGY | Facility: CLINIC | Age: 27
End: 2017-10-21

## 2017-10-21 DIAGNOSIS — K51.30 ULCERATIVE RECTOSIGMOIDITIS WITHOUT COMPLICATION (H): Primary | ICD-10-CM

## 2017-10-25 ENCOUNTER — CARE COORDINATION (OUTPATIENT)
Dept: GASTROENTEROLOGY | Facility: CLINIC | Age: 27
End: 2017-10-25

## 2017-10-26 ENCOUNTER — TELEPHONE (OUTPATIENT)
Dept: GASTROENTEROLOGY | Facility: CLINIC | Age: 27
End: 2017-10-26

## 2017-10-26 NOTE — TELEPHONE ENCOUNTER
PA Initiation    Medication: adalimumab (Humira) 40mg/ 0.8mL pens Crohn's Starter Kit  Insurance Company: [employer] - Phone [phone] Fax [phone]  Pharmacy Filling the Rx: [facility] [place]  Filling Pharmacy Phone: [phone]  Filling Pharmacy Fax:    Start Date: 10/26/2017

## 2017-10-27 NOTE — TELEPHONE ENCOUNTER
Prior Authorization Approval    Authorization Effective Date: 10/26/2017  Authorization Expiration Date: 10/26/2019  Medication: adalimumab (Humira) 40mg/ 0.8mL pens Crohn's Starter Kit - Approved  Approved Dose/Quantity: starter pack and maintenance   Reference #: CMM key# [id number]   Insurance Company: [employer] - Phone [phone] Fax [phone]  Expected CoPay:       CoPay Card Available:      Foundation Assistance Needed:    Which Pharmacy is filling the prescription (Not needed for infusion/clinic administered): [facility] [place]  Pharmacy Notified:    Patient Notified:

## 2017-10-31 ENCOUNTER — CARE COORDINATION (OUTPATIENT)
Dept: GASTROENTEROLOGY | Facility: CLINIC | Age: 27
End: 2017-10-31

## 2017-10-31 DIAGNOSIS — K51.30 ULCERATIVE RECTOSIGMOIDITIS WITHOUT COMPLICATION (H): ICD-10-CM

## 2017-11-01 ENCOUNTER — CARE COORDINATION (OUTPATIENT)
Dept: GASTROENTEROLOGY | Facility: CLINIC | Age: 27
End: 2017-11-01

## 2017-11-01 DIAGNOSIS — K51.30 ULCERATIVE RECTOSIGMOIDITIS WITHOUT COMPLICATION (H): ICD-10-CM

## 2017-11-20 ENCOUNTER — TELEPHONE (OUTPATIENT)
Dept: GASTROENTEROLOGY | Facility: CLINIC | Age: 27
End: 2017-11-20

## 2017-11-20 ASSESSMENT — ENCOUNTER SYMPTOMS
BOWEL INCONTINENCE: 0
NAIL CHANGES: 0
ALTERED TEMPERATURE REGULATION: 0
CONSTIPATION: 0
DIFFICULTY URINATING: 0
JOINT SWELLING: 0
DIARRHEA: 1
BLOOD IN STOOL: 0
RECTAL PAIN: 0
FEVER: 0
POOR WOUND HEALING: 0
FATIGUE: 1
HALLUCINATIONS: 0
ABDOMINAL PAIN: 0
ARTHRALGIAS: 1
BLOATING: 1
VOMITING: 0
BACK PAIN: 1
HEARTBURN: 0
NAUSEA: 0
NIGHT SWEATS: 0
POLYPHAGIA: 0
JAUNDICE: 0
HEMATURIA: 0
STIFFNESS: 0
WEIGHT LOSS: 0
POLYDIPSIA: 0
NECK PAIN: 0
MYALGIAS: 0
DYSURIA: 0
DECREASED APPETITE: 0
CHILLS: 0
MUSCLE CRAMPS: 0
INCREASED ENERGY: 0
MUSCLE WEAKNESS: 0
FLANK PAIN: 0
WEIGHT GAIN: 0
SKIN CHANGES: 0

## 2017-11-21 ENCOUNTER — OFFICE VISIT (OUTPATIENT)
Dept: GASTROENTEROLOGY | Facility: CLINIC | Age: 27
End: 2017-11-21

## 2017-11-21 VITALS
OXYGEN SATURATION: 97 % | BODY MASS INDEX: 25.54 KG/M2 | SYSTOLIC BLOOD PRESSURE: 128 MMHG | DIASTOLIC BLOOD PRESSURE: 78 MMHG | TEMPERATURE: 97.6 F | WEIGHT: 192.7 LBS | HEART RATE: 68 BPM | HEIGHT: 73 IN

## 2017-11-21 DIAGNOSIS — K51.00 ULCERATIVE PANCOLITIS WITHOUT COMPLICATION (H): Primary | ICD-10-CM

## 2017-11-21 ASSESSMENT — PAIN SCALES - GENERAL: PAINLEVEL: NO PAIN (0)

## 2017-11-21 NOTE — NURSING NOTE
"Chief Complaint   Patient presents with     RECHECK     F/U       Vitals:    11/21/17 0817   BP: 128/78   BP Location: Left arm   Patient Position: Chair   Cuff Size: Adult Regular   Pulse: 68   Temp: 97.6  F (36.4  C)   SpO2: 97%   Weight: 192 lb 11.2 oz   Height: 6' 1\"       Body mass index is 25.42 kg/(m^2).    Ursula Hassan                       "

## 2017-11-21 NOTE — PATIENT INSTRUCTIONS
It was a pleasure taking care of you today.  I've included a brief summary of our discussion and care plan from today's visit below.  Please review this information with your primary care provider.  _______________________________________________________________________    My recommendations are summarized as follows:    -- Two more weeks of Uceris, then stop    --  Continue Humira    --  Plan for flexible sigmoidoscopy in Feb   You are scheduled on February 5   Check in time 830  [facility]   [address] [facility]   You will be mailed the instructions  You will be called by the pre assessment nurse about one week        Return to GI Clinic in 6 months to review your progress.        Thanks Julia Huitron RN Care Coordinator for Dr. Escobedo  Phone   [phone]        _______________________________________________________________________    Who do I call with any questions after my visit?  Please be in touch if there are any further questions that arise following today's visit.  There are multiple ways to contact your gastroenterology care team.        During business hours, you may reach a Gastroenterology nurse at [phone].      To schedule or reschedule an appointment, please call [phone].       You can always send a secure message through [facility].  [facility] messages are answered by your nurse or doctor typically within 24 hours.  Please allow extra time on weekends and holidays.        For urgent/emergent questions after business hours, you may reach the on-call GI Fellow by contacting the [facility]  at [phone].     How will I get the results of any tests ordered?    You will receive all of your results.  If you have signed up for [facility], any tests ordered at your visit will be available to you after your physician reviews them.  Typically this takes 1-2 weeks.  If there are urgent results that require a change in your care plan, your physician or nurse will call you  to discuss the next steps.      What is [employer]?  [employer] is a secure way for you to access all of your healthcare records from the [facility].  It is a web based computer program, so you can sign on to it from any location.  It also allows you to send secure messages to your care team.  I recommend signing up for [employer] access if you have not already done so and are comfortable with using a computer.      How to I schedule a follow-up visit?  If you did not schedule a follow-up visit today, please call [phone] to schedule a follow-up office visit.        Sincerely,    Gordo Escobedo MD    [facility]  Division of Gastroenterology

## 2017-11-21 NOTE — PROGRESS NOTES
IBD CLINIC VISIT     CC/REFERRING MD:  Self referred  REASON FOR FOLLOW UP: Ulcerative colitis    IBD HISTORY  Age at diagnosis: 26, 2017  Extent of disease: Pancolitis  Current UC medications: Adalimumab (started Nov, 2017), uceris 9mg  Prior UC surgeries: None  Prior IBD Medications:   Lialda - flu like illness with fevers, abdominal pains  Apriso - flu like illlness  Sulfasalazine - did not control symptoms    DRUG MONITORING  TPMT enzyme activity: Has not been obtained    6-TGN/6-MMPN levels: --    Biologic concentration:--    DISEASE ASSESSMENT  Labs:  CRP, ESR Results  Recent Labs   Lab Test  10/20/17   0907   CRP  6.6   SED  7     Lab Results   Component Value Date    ALBUMIN 3.7 10/20/2017     Endoscopic assessment: Colonoscopy on 02/16/2017 revealed continuous erythema throughout the entire colon and patchy inflammation within the ileum.  Biopsies in the duodenum did not show any evidence of celiac.  Biopsies from the terminal ileum showed nonspecific acute ileitis presumed to be backwash ileitis.  Random colon biopsies showed moderate active chronic colitis consistent with inflammatory bowel disease.   Enterography: --  Fecal calprotectin: --   C diff: Negative 10/2017  pMayo score: 0/9    ASSESSMENT/PLAN  This is a 27-year-old male with a history of ulcerative pancolitis who was recently started on adalimumab therapy.      1.  Ulcerative pancolitis:  The patient has had a substantial clinical response and is in clinical remission, although not yet off of steroids, after his initial dose of adalimumab.  He is still on his induction phase of adalimumab, and so I recommend he continue his Uceris for another 2 weeks.  We will plan on staging him endoscopically after about 12-16 weeks of therapy.  We will also plan to check adalimumab trough levels in the post-induction phase.   -- Continue adalimumab at standard dosing, he has received 2 doses at this time.   -- Continue Uceris for 2 more weeks and okay to  stop.   -- Plan for flexible sigmoidoscopy in about 3 months to assess for mucosal healing.   -- Blood work at the time of flexible sigmoidoscopy and plan for an every 3-4 months CBC, LFTs and inflammatory markers.   -- We will plan for adalimumab level after his induction phase.     2.  Joint pain, substantially improved and likely related to his underlying IBD:  If he has recurrence, we will consider delayed injection reactions to adalimumab versus other non-IBD related arthritis and will consider a referral; however, at this time, no further workup was needed.     IBD healthcare maintenance based on patients current medication:  Anti-TNF medication therapy vs Anti-integrin therapy  Vaccinations:  -- Influenza (every year): Last given 2017  -- TdaP (every 10 years): Last given 2015  -- Pneumococcal Pneumonia (once then every 5 years): Has not received  -- Yearly assessment for latent Tb (verbal screening and exam, PPD or QuantiFERON-Tb testing): Normal chest xray 3/12/17    One time confirmation of immunity or serologies:  -- Hepatitis A (serologies or immunizations): Hep A Ab, Total was nonreactive ([facility])  -- Hepatitis B (serologies or immunizations): immune by serologies ([facility])  -- Varicella: Had chickenpox as a child  -- MMR: Not documented  -- HPV (all aged 18-26): Not documented  -- Meningococcal meningitis (all patients at risk for meningitis): 2005   -- Due to the immunosuppression in this patient, I would not advise administration of live vaccines such as varicella/VZV, intranasal influenza, MMR, or yellow fever vaccine (if travelling).      Bone mineral density screening   -- Recommend all patients supplement with calcium and vitamin D  -- Given prior steroid use recommend DEXA if not already done    Cancer Screening:  Colon cancer screening:  Given pancolitis colonoscopy every 1-3 years recommended after 8 years of disease.  Dysplasia screening is recommended 2024.    Cervical cancer screening:  N/A    Skin cancer screening: Annual visual exam of skin by dermatologist since patient is immunocompromised    Depression Screening:  -- Over the last month, have you felt down, depressed, or hopeless? No  -- Over the last month, have you felt little interest or pleasure doing things? No    Misc:  -- Avoid tobacco use  -- Avoid NSAIDs as there is potentially a 25% chance of causing an IBD flare    RTC 6 months    Thank you for this consultation.  It was a pleasure to participate in the care of this patient; please contact us with any further questions.      Gordo Escobedo MD    [facility]  Inflammatory Bowel Disease Program  Division of Gastroenterology, Hepatology and Nutrition    HPI:   This is a 27-year-old male who comes in today for followup.  He was previously seen by Miguel Turner who started him on Uceris and initiated adalimumab for moderate to severe ulcerative colitis flare.  He has taken his first injection of adalimumab at the beginning of November, and within 5 days, he went from 7 stools a day with blood to 2 formed stools a day without blood.  He believes 1-2 stools is his baseline.  He is still on Uceris but did not require any prednisone. His 2 main questions today are when to see another endoscopy, and what should he do with Uceris.      Otherwise, he feels like he is in remission.  The only ongoing he has difficulty tolerating calcium pills.  He feels bloating and an abdominal fullness and was unable to actually take these when recommended in the past.  He notes some difficultly with cheese to.  Otherwise, he has actually cut out dairy and has not reintroduced dairy to his diet.  Otherwise, no nausea, no vomiting, no urgency and no other notable symptoms.  He is getting  in the upcoming months and will be taking a honeymoon to [place].  He continues to work in [place] and is coming back to the [place] almost every month and likely will be back in the [facility] permanently in about 6 months.     ROS:    No fevers or chills  No weight loss  No blurry vision, double vision or change in vision  No sore throat  No lymphadenopathy  No headache, paraesthesias, or weakness in a limb  No shortness of breath or wheezing  No chest pain or pressure  No arthralgias or myalgias  No rashes or skin changes  No odynophagia or dysphagia  No BRBPR, hematochezia, melena  No dysuria, frequency or urgency  No hot/cold intolerance or polyria  No anxiety or depression    Extra intestinal manifestations of IBD:  No uveitis/episcleritis  No aphthous ulcers   No arthritis   No erythema nodosum/pyoderma gangrenosum.     PERTINENT PAST MEDICAL HISTORY:  See HPI    PREVIOUS SURGERIES:  None    ALLERGIES:   No Known Allergies    PERTINENT MEDICATIONS:    Current Outpatient Prescriptions:      adalimumab (HUMIRA PEN) 40 MG/0.8ML pen kit, Inject 0.8 mLs (40 mg) Subcutaneous every 14 days, Disp: 1.6 mL, Rfl: 11     adalimumab (HUMIRA PEN-CROHNS STARTER) 40 MG/0.8ML pen kit, on day 1,  160mg (4-40mg). Day 15 take 80mg (2-40mg). Starting on day 29 and every other week take 40mg., Disp: 6 each, Rfl: 0     sulfaSALAzine (AZULFIDINE) 500 MG tablet, Take 500 mg by mouth 4 times daily, Disp: , Rfl:      PREDNISONE PO, , Disp: , Rfl:      budesonide (UCERIS) 9 MG 24 hr tablet, Take 1 tablet (9 mg) by mouth every morning, Disp: 90 tablet, Rfl: 3    SOCIAL HISTORY:  Social History     Social History     Marital status: Single     Spouse name: N/A     Number of children: N/A     Years of education: N/A     Occupational History     Not on file.     Social History Main Topics     Smoking status: Never Smoker     Smokeless tobacco: Never Used     Alcohol use No     Drug use: No     Sexual activity: No     Other Topics Concern     Not on file     Social History Narrative     FAMILY HISTORY:  No CRC, no IBD  Past/family/social history reviewed and no changes    PHYSICAL EXAMINATION:  Constitutional: aaox3,  "cooperative, pleasant, not dyspneic/diaphoretic, no acute distress  Vitals reviewed: /78 (BP Location: Left arm, Patient Position: Chair, Cuff Size: Adult Regular)  Pulse 68  Temp 97.6  F (36.4  C)  Ht 1.854 m (6' 1\")  Wt 87.4 kg (192 lb 11.2 oz)  SpO2 97%  BMI 25.42 kg/m2  Wt:   Wt Readings from Last 2 Encounters:   11/21/17 87.4 kg (192 lb 11.2 oz)   10/20/17 86.9 kg (191 lb 9.6 oz)      Eyes: Sclera anicteric/injected  Ears/nose/mouth/throat: Normal oropharynx without ulcers or exudate, mucus membranes moist, hearing intact  Neck: supple, thyroid normal size  CV: No edema  Respiratory: Unlabored breathing  Lymph: No axillary, submandibular, supraclavicular or inguinal lymphadenopathy  Abd: Nondistended, +bs, no hepatosplenomegaly, nontender, no peritoneal signs  Skin: warm, perfused, no jaundice  Psych: Normal affect  MSK: Normal gait      PERTINENT STUDIES:  Most recent CBC:  Recent Labs   Lab Test  10/20/17   0907  03/12/17   1418   WBC  5.4  5.1   HGB  13.1*  14.8   HCT  39.4*  43.1   PLT  241  227     Most recent hepatic panel:  Recent Labs   Lab Test  10/20/17   0907  03/12/17   1418   ALT  21  24   AST  11  11     Most recent creatinine:  Recent Labs   Lab Test  03/12/17   1418   CR  0.99         Answers for HPI/ROS submitted by the patient on 11/20/2017   General Symptoms: Yes  Skin Symptoms: Yes  HENT Symptoms: No  EYE SYMPTOMS: No  HEART SYMPTOMS: No  LUNG SYMPTOMS: No  INTESTINAL SYMPTOMS: Yes  URINARY SYMPTOMS: Yes  REPRODUCTIVE SYMPTOMS: No  SKELETAL SYMPTOMS: Yes  BLOOD SYMPTOMS: No  NERVOUS SYSTEM SYMPTOMS: No  MENTAL HEALTH SYMPTOMS: No  Fever: No  Loss of appetite: No  Weight loss: No  Weight gain: No  Fatigue: Yes  Night sweats: No  Chills: No  Increased stress: No  Excessive hunger: No  Excessive thirst: No  Feeling hot or cold when others believe the temperature is normal: No  Loss of height: No  Post-operative complications: No  Surgical site pain: No  Hallucinations: No  Change in " or Loss of Energy: No  Hyperactivity: No  Confusion: No  Changes in hair: No  Changes in moles/birth marks: No  Itching: Yes  Rashes: No  Changes in nails: No  Acne: Yes  Change in facial hair: No  Warts: No  Non-healing sores: No  Scarring: No  Flaking of skin: No  Color changes of hands/feet in cold : No  Sun sensitivity: No  Skin thickening: No  Heart burn or indigestion: No  Nausea: No  Vomiting: No  Abdominal pain: No  Bloating: Yes  Constipation: No  Diarrhea: Yes  Blood in stool: No  Black stools: No  Rectal or Anal pain: No  Fecal incontinence: No  Yellowing of skin or eyes: No  Vomit with blood: No  Change in stools: Yes  Trouble holding urine or incontinence: Yes  Pain or burning: No  Trouble starting or stopping: No  Increased frequency of urination: No  Blood in urine: No  Decreased frequency of urination: No  Frequent nighttime urination: No  Flank pain: No  Difficulty emptying bladder: No  Back pain: Yes  Muscle aches: No  Neck pain: No  Swollen joints: No  Joint pain: Yes  Bone pain: No  Muscle cramps: No  Muscle weakness: No  Joint stiffness: No  Bone fracture: No

## 2017-11-21 NOTE — NURSING NOTE
Printed after visit summary given to pt.  Pt will need to schedule endoscopy when he next time travels to [place].  [place]ives out state.  Labs when he comes back in February .

## 2017-11-21 NOTE — MR AVS SNAPSHOT
After Visit Summary   11/21/2017    Kyle Coelho    MRN: [id number]           Patient Information     Date Of Birth          1990        Visit Information        Provider Department      11/21/2017 8:20 AM Gordo Escobedo MD [facility]        Today's Diagnoses     UC (ulcerative colitis) (H)    -  1      Care Instructions    It was a pleasure taking care of you today.  I've included a brief summary of our discussion and care plan from today's visit below.  Please review this information with your primary care provider.  _______________________________________________________________________    My recommendations are summarized as follows:    -- Two more weeks of Uceris, then stop    --  Continue Humira    --  Plan for flexible sigmoidoscopy in Feb   You are scheduled on February 5   Check in time 830  [facility]   [address] [facility]   You will be mailed the instructions  You will be called by the pre assessment nurse about one week        Return to GI Clinic in 6 months to review your progress.        Thanks Julia Huitron RN Care Coordinator for Dr. Escobedo  Phone   [phone]        _______________________________________________________________________    Who do I call with any questions after my visit?  Please be in touch if there are any further questions that arise following today's visit.  There are multiple ways to contact your gastroenterology care team.        During business hours, you may reach a Gastroenterology nurse at [phone].      To schedule or reschedule an appointment, please call [phone].       You can always send a secure message through [employer].  [employer] messages are answered by your nurse or doctor typically within 24 hours.  Please allow extra time on weekends and holidays.        For urgent/emergent questions after business hours, you may reach the on-call GI Fellow by contacting the [facility]   at [phone].     How will I get the results of any tests ordered?    You will receive all of your results.  If you have signed up for [employer], any tests ordered at your visit will be available to you after your physician reviews them.  Typically this takes 1-2 weeks.  If there are urgent results that require a change in your care plan, your physician or nurse will call you to discuss the next steps.      What is [employer]?  [employer] is a secure way for you to access all of your healthcare records from the [facility].  It is a web based computer program, so you can sign on to it from any location.  It also allows you to send secure messages to your care team.  I recommend signing up for [employer] access if you have not already done so and are comfortable with using a computer.      How to I schedule a follow-up visit?  If you did not schedule a follow-up visit today, please call [phone] to schedule a follow-up office visit.        Sincerely,    Gordo Escobedo MD    [facility]  Division of Gastroenterology                Follow-ups after your visit        Additional Services     GASTROENTEROLOGY ADULT REF PROCEDURE ONLY       Last Lab Result: Creatinine (mg/dL)       Date                     Value                 03/12/2017               0.99             ----------  Body mass index is 25.42 kg/(m^2).     Needed:  No  Language:  English    Patient will be contacted to schedule procedure.     Please be aware that coverage of these services is subject to the terms and limitations of your health insurance plan.  Call member services at your health plan with any benefit or coverage questions.  Any procedures must be performed at a [place] facility OR coordinated by your clinic's referral office.    Please bring the following with you to your appointment:    (1) Any X-Rays, CTs or MRIs which have been performed.  Contact the facility where they were done  to arrange for  prior to your scheduled appointment.    (2) List of current medications   (3) This referral request   (4) Any documents/labs given to you for this referral                  Your next 10 appointments already scheduled     Feb 05, 2018  9:30 AM CST   Flexible Sigmoidoscopy with Gordo Escobedo MD   [facility]    [address] [place] [address] [id number] [phone]              Future tests that were ordered for you today     Open Future Orders        Priority Expected Expires Ordered    CBC with platelets differential Routine 11/21/2017 12/21/2017 11/21/2017    CRP inflammation Routine 11/21/2017 12/21/2017 11/21/2017    Erythrocyte sedimentation rate auto Routine 11/21/2017 12/21/2017 11/21/2017    Hepatic panel Routine 11/21/2017 12/21/2017 11/21/2017            Who to contact     Please call your clinic at [phone] to:    Ask questions about your health    Make or cancel appointments    Discuss your medicines    Learn about your test results    Speak to your doctor   If you have compliments or concerns about an experience at your clinic, or if you wish to file a complaint, please contact [facility] Patient Relations at [phone] or email us at [email or web address]         Additional Information About Your Visit        [employer] Information     [employer] gives you secure access to your electronic health record. If you see a primary care provider, you can also send messages to your care team and make appointments. If you have questions, please call your primary care clinic.  If you do not have a primary care provider, please call [phone] and they will assist you.      [employer] is an electronic gateway that provides easy, online access to your medical records. With [employer], you can request a clinic appointment, read your test results, renew a prescription or communicate with your  "care team.     To access your existing account, please contact your [facility] or call [phone] for assistance.        Care EveryWhere ID     This is your Care EveryWhere ID. This could be used by other organizations to access your [place] medical records  [id number]        Your Vitals Were     Pulse Temperature Height Pulse Oximetry BMI (Body Mass Index)       68 97.6  F (36.4  C) 1.854 m (6' 1\") 97% 25.42 kg/m2        Blood Pressure from Last 3 Encounters:   11/21/17 128/78   10/20/17 136/79   03/12/17 141/74    Weight from Last 3 Encounters:   11/21/17 87.4 kg (192 lb 11.2 oz)   10/20/17 86.9 kg (191 lb 9.6 oz)   03/12/17 76.2 kg (168 lb)              We Performed the Following     GASTROENTEROLOGY ADULT REF PROCEDURE ONLY        Primary Care Provider Office Phone # Fax #    Gabriel Stefano Ibrahim [phone]       [facility] [address] [place] [address]        Equal Access to Services     [facility]: Hadii aad ku hadasho Sobrody, waaxda luqadaha, qaybta kaalmada jaky, negar white . [facility] [phone].    ATENCIÓN: Si habla español, tiene a mabry disposición servicios gratuitos de asistencia lingüística. [facility] [phone].    We comply with applicable federal civil rights laws and [place] laws. We do not discriminate on the basis of race, color, national origin, age, disability, sex, sexual orientation, or gender identity.            Thank you!     Thank you for choosing [facility]  for your care. Our goal is always to provide you with excellent care. Hearing back from our patients is one way we can continue to improve our services. Please take a few minutes to complete the written survey that you may receive in the mail after your visit with us. Thank you!             Your Updated Medication List - Protect others around you: Learn how to safely use, store and throw " away your medicines at www.disposemymeds.org.          This list is accurate as of: 11/21/17  9:21 AM.  Always use your most recent med list.                   Brand Name Dispense Instructions for use Diagnosis    * adalimumab 40 MG/0.8ML pen kit    HUMIRA PEN    1.6 mL    Inject 0.8 mLs (40 mg) Subcutaneous every 14 days    Ulcerative rectosigmoiditis without complication (H)       * adalimumab 40 MG/0.8ML pen kit    HUMIRA PEN-CROHNS STARTER    6 each    on day 1,  160mg (4-40mg). Day 15 take 80mg (2-40mg). Starting on day 29 and every other week take 40mg.    Ulcerative rectosigmoiditis without complication (H)       budesonide 9 MG 24 hr tablet    UCERIS    90 tablet    Take 1 tablet (9 mg) by mouth every morning    Ulcerative rectosigmoiditis with rectal bleeding (H)       PREDNISONE PO           sulfaSALAzine 500 MG tablet    AZULFIDINE     Take 500 mg by mouth 4 times daily        * Notice:  This list has 2 medication(s) that are the same as other medications prescribed for you. Read the directions carefully, and ask your doctor or other care provider to review them with you.

## 2017-11-21 NOTE — LETTER
11/21/2017     RE: Kyle Coelho  [id number]  [place] [address]     Dear Colleague,    Thank you for referring your patient, Kyle Coelho, to the [facility] at [facility]. Please see a copy of my visit note below.    IBD CLINIC VISIT     CC/REFERRING MD:  Self referred  REASON FOR FOLLOW UP: Ulcerative colitis    IBD HISTORY  Age at diagnosis: 26, 2017  Extent of disease: Pancolitis  Current UC medications: Adalimumab (started Nov, 2017), uceris 9mg  Prior UC surgeries: None  Prior IBD Medications:   Lialda - flu like illness with fevers, abdominal pains  Apriso - flu like illlness  Sulfasalazine - did not control symptoms    DRUG MONITORING  TPMT enzyme activity: Has not been obtained    6-TGN/6-MMPN levels: --    Biologic concentration:--    DISEASE ASSESSMENT  Labs:  CRP, ESR Results  Recent Labs   Lab Test  10/20/17   0907   CRP  6.6   SED  7     Lab Results   Component Value Date    ALBUMIN 3.7 10/20/2017     Endoscopic assessment: Colonoscopy on 02/16/2017 revealed continuous erythema throughout the entire colon and patchy inflammation within the ileum.  Biopsies in the duodenum did not show any evidence of celiac.  Biopsies from the terminal ileum showed nonspecific acute ileitis presumed to be backwash ileitis.  Random colon biopsies showed moderate active chronic colitis consistent with inflammatory bowel disease.   Enterography: --  Fecal calprotectin: --   C diff: Negative 10/2017  pMayo score: 0/9    ASSESSMENT/PLAN  This is a 27-year-old male with a history of ulcerative pancolitis who was recently started on adalimumab therapy.      1.  Ulcerative pancolitis:  The patient has had a substantial clinical response and is in clinical remission, although not yet off of steroids, after his initial dose of adalimumab.  He is still on his induction phase of adalimumab, and so I recommend he continue his Uceris for  another 2 weeks.  We will plan on staging him endoscopically after about 12-16 weeks of therapy.  We will also plan to check adalimumab trough levels in the post-induction phase.   -- Continue adalimumab at standard dosing, he has received 2 doses at this time.   -- Continue Uceris for 2 more weeks and okay to stop.   -- Plan for flexible sigmoidoscopy in about 3 months to assess for mucosal healing.   -- Blood work at the time of flexible sigmoidoscopy and plan for an every 3-4 months CBC, LFTs and inflammatory markers.   -- We will plan for adalimumab level after his induction phase.     2.  Joint pain, substantially improved and likely related to his underlying IBD:  If he has recurrence, we will consider delayed injection reactions to adalimumab versus other non-IBD related arthritis and will consider a referral; however, at this time, no further workup was needed.     IBD healthcare maintenance based on patients current medication:  Anti-TNF medication therapy vs Anti-integrin therapy  Vaccinations:  -- Influenza (every year): Last given 2017  -- TdaP (every 10 years): Last given 2015  -- Pneumococcal Pneumonia (once then every 5 years): Has not received  -- Yearly assessment for latent Tb (verbal screening and exam, PPD or QuantiFERON-Tb testing): Normal chest xray 3/12/17    One time confirmation of immunity or serologies:  -- Hepatitis A (serologies or immunizations): Hep A Ab, Total was nonreactive ([facility])  -- Hepatitis B (serologies or immunizations): immune by serologies ([facility])  -- Varicella: Had chickenpox as a child  -- MMR: Not documented  -- HPV (all aged 18-26): Not documented  -- Meningococcal meningitis (all patients at risk for meningitis): 2005   -- Due to the immunosuppression in this patient, I would not advise administration of live vaccines such as varicella/VZV, intranasal influenza, MMR, or yellow fever vaccine (if travelling).      Bone mineral density screening   -- Recommend all patients  supplement with calcium and vitamin D  -- Given prior steroid use recommend DEXA if not already done    Cancer Screening:  Colon cancer screening:  Given pancolitis colonoscopy every 1-3 years recommended after 8 years of disease.  Dysplasia screening is recommended 2024.    Cervical cancer screening: N/A    Skin cancer screening: Annual visual exam of skin by dermatologist since patient is immunocompromised    Depression Screening:  -- Over the last month, have you felt down, depressed, or hopeless? No  -- Over the last month, have you felt little interest or pleasure doing things? No    Misc:  -- Avoid tobacco use  -- Avoid NSAIDs as there is potentially a 25% chance of causing an IBD flare    RTC 6 months    Thank you for this consultation.  It was a pleasure to participate in the care of this patient; please contact us with any further questions.      Gordo Escobedo MD    [facility]  Inflammatory Bowel Disease Program  Division of Gastroenterology, Hepatology and Nutrition    HPI:   This is a 27-year-old male who comes in today for followup.  He was previously seen by Miguel Turner who started him on Uceris and initiated adalimumab for moderate to severe ulcerative colitis flare.  He has taken his first injection of adalimumab at the beginning of November, and within 5 days, he went from 7 stools a day with blood to 2 formed stools a day without blood.  He believes 1-2 stools is his baseline.  He is still on Uceris but did not require any prednisone. His 2 main questions today are when to see another endoscopy, and what should he do with Uceris.      Otherwise, he feels like he is in remission.  The only ongoing he has difficulty tolerating calcium pills.  He feels bloating and an abdominal fullness and was unable to actually take these when recommended in the past.  He notes some difficultly with cheese to.  Otherwise, he has actually cut out dairy and has not reintroduced dairy to  his diet.  Otherwise, no nausea, no vomiting, no urgency and no other notable symptoms.  He is getting  in the upcoming months and will be taking a honeymoon to [place].  He continues to work in [place] and is coming back to the [place] almost every month and likely will be back in the [place] permanently in about 6 months.     ROS:    No fevers or chills  No weight loss  No blurry vision, double vision or change in vision  No sore throat  No lymphadenopathy  No headache, paraesthesias, or weakness in a limb  No shortness of breath or wheezing  No chest pain or pressure  No arthralgias or myalgias  No rashes or skin changes  No odynophagia or dysphagia  No BRBPR, hematochezia, melena  No dysuria, frequency or urgency  No hot/cold intolerance or polyria  No anxiety or depression    Extra intestinal manifestations of IBD:  No uveitis/episcleritis  No aphthous ulcers   No arthritis   No erythema nodosum/pyoderma gangrenosum.     PERTINENT PAST MEDICAL HISTORY:  See HPI    PREVIOUS SURGERIES:  None    ALLERGIES:   No Known Allergies    PERTINENT MEDICATIONS:    Current Outpatient Prescriptions:      adalimumab (HUMIRA PEN) 40 MG/0.8ML pen kit, Inject 0.8 mLs (40 mg) Subcutaneous every 14 days, Disp: 1.6 mL, Rfl: 11     adalimumab (HUMIRA PEN-CROHNS STARTER) 40 MG/0.8ML pen kit, on day 1,  160mg (4-40mg). Day 15 take 80mg (2-40mg). Starting on day 29 and every other week take 40mg., Disp: 6 each, Rfl: 0     sulfaSALAzine (AZULFIDINE) 500 MG tablet, Take 500 mg by mouth 4 times daily, Disp: , Rfl:      PREDNISONE PO, , Disp: , Rfl:      budesonide (UCERIS) 9 MG 24 hr tablet, Take 1 tablet (9 mg) by mouth every morning, Disp: 90 tablet, Rfl: 3    SOCIAL HISTORY:  Social History     Social History     Marital status: Single     Spouse name: N/A     Number of children: N/A     Years of education: N/A     Occupational History     Not on file.     Social History Main Topics     Smoking status: Never Smoker      "Smokeless tobacco: Never Used     Alcohol use No     Drug use: No     Sexual activity: No     Other Topics Concern     Not on file     Social History Narrative     FAMILY HISTORY:  No CRC, no IBD  Past/family/social history reviewed and no changes    PHYSICAL EXAMINATION:  Constitutional: aaox3, cooperative, pleasant, not dyspneic/diaphoretic, no acute distress  Vitals reviewed: /78 (BP Location: Left arm, Patient Position: Chair, Cuff Size: Adult Regular)  Pulse 68  Temp 97.6  F (36.4  C)  Ht 1.854 m (6' 1\")  Wt 87.4 kg (192 lb 11.2 oz)  SpO2 97%  BMI 25.42 kg/m2  Wt:   Wt Readings from Last 2 Encounters:   11/21/17 87.4 kg (192 lb 11.2 oz)   10/20/17 86.9 kg (191 lb 9.6 oz)      Eyes: Sclera anicteric/injected  Ears/nose/mouth/throat: Normal oropharynx without ulcers or exudate, mucus membranes moist, hearing intact  Neck: supple, thyroid normal size  CV: No edema  Respiratory: Unlabored breathing  Lymph: No axillary, submandibular, supraclavicular or inguinal lymphadenopathy  Abd: Nondistended, +bs, no hepatosplenomegaly, nontender, no peritoneal signs  Skin: warm, perfused, no jaundice  Psych: Normal affect  MSK: Normal gait      PERTINENT STUDIES:  Most recent CBC:  Recent Labs   Lab Test  10/20/17   0907  03/12/17   1418   WBC  5.4  5.1   HGB  13.1*  14.8   HCT  39.4*  43.1   PLT  241  227     Most recent hepatic panel:  Recent Labs   Lab Test  10/20/17   0907  03/12/17   1418   ALT  21  24   AST  11  11     Most recent creatinine:  Recent Labs   Lab Test  03/12/17   1418   CR  0.99         Answers for HPI/ROS submitted by the patient on 11/20/2017   General Symptoms: Yes  Skin Symptoms: Yes  HENT Symptoms: No  EYE SYMPTOMS: No  HEART SYMPTOMS: No  LUNG SYMPTOMS: No  INTESTINAL SYMPTOMS: Yes  URINARY SYMPTOMS: Yes  REPRODUCTIVE SYMPTOMS: No  SKELETAL SYMPTOMS: Yes  BLOOD SYMPTOMS: No  NERVOUS SYSTEM SYMPTOMS: No  MENTAL HEALTH SYMPTOMS: No  Fever: No  Loss of appetite: No  Weight loss: No  Weight " gain: No  Fatigue: Yes  Night sweats: No  Chills: No  Increased stress: No  Excessive hunger: No  Excessive thirst: No  Feeling hot or cold when others believe the temperature is normal: No  Loss of height: No  Post-operative complications: No  Surgical site pain: No  Hallucinations: No  Change in or Loss of Energy: No  Hyperactivity: No  Confusion: No  Changes in hair: No  Changes in moles/birth marks: No  Itching: Yes  Rashes: No  Changes in nails: No  Acne: Yes  Change in facial hair: No  Warts: No  Non-healing sores: No  Scarring: No  Flaking of skin: No  Color changes of hands/feet in cold : No  Sun sensitivity: No  Skin thickening: No  Heart burn or indigestion: No  Nausea: No  Vomiting: No  Abdominal pain: No  Bloating: Yes  Constipation: No  Diarrhea: Yes  Blood in stool: No  Black stools: No  Rectal or Anal pain: No  Fecal incontinence: No  Yellowing of skin or eyes: No  Vomit with blood: No  Change in stools: Yes  Trouble holding urine or incontinence: Yes  Pain or burning: No  Trouble starting or stopping: No  Increased frequency of urination: No  Blood in urine: No  Decreased frequency of urination: No  Frequent nighttime urination: No  Flank pain: No  Difficulty emptying bladder: No  Back pain: Yes  Muscle aches: No  Neck pain: No  Swollen joints: No  Joint pain: Yes  Bone pain: No  Muscle cramps: No  Muscle weakness: No  Joint stiffness: No  Bone fracture: No    Again, thank you for allowing me to participate in the care of your patient.      Sincerely,    Gordo Escobedo MD       none

## 2017-12-15 ENCOUNTER — CARE COORDINATION (OUTPATIENT)
Dept: GASTROENTEROLOGY | Facility: CLINIC | Age: 27
End: 2017-12-15

## 2017-12-15 NOTE — PROGRESS NOTES
Called jennifer and gave a verbal order Angela pharmacist telephone order for a replace pen.  Will contact pt and send overnight fx. Pt due on December 18.

## 2018-01-29 ENCOUNTER — TELEPHONE (OUTPATIENT)
Dept: GASTROENTEROLOGY | Facility: OUTPATIENT CENTER | Age: 28
End: 2018-01-29

## 2018-02-01 ENCOUNTER — TELEPHONE (OUTPATIENT)
Dept: GASTROENTEROLOGY | Facility: CLINIC | Age: 28
End: 2018-02-01

## 2018-02-01 ENCOUNTER — TELEPHONE (OUTPATIENT)
Dept: GASTROENTEROLOGY | Facility: OUTPATIENT CENTER | Age: 28
End: 2018-02-01

## 2018-02-01 NOTE — TELEPHONE ENCOUNTER
Patient scheduled for Flexible sigmoidoscopy    Indication for procedure. IBD    Referring Provider. Dr. Escobedo    ? n/a    Arrival time verified? Instructed patient to arrive at 8;30 AM    Facility location verified? Yes [address] [place] [address]    Instructions given regarding prep and procedure Instructions reviewed    Prep Type Fleet enema    Are you taking any anticoagulants or blood thinners? no    Instructions given? n/a    Electronic implanted devices? denies    Pre procedure teaching completed? Yes    Transportation from procedure?  policy reviewed    H&P / Pre op physical completed? n/a

## 2018-02-05 ENCOUNTER — TRANSFERRED RECORDS (OUTPATIENT)
Dept: HEALTH INFORMATION MANAGEMENT | Facility: CLINIC | Age: 28
End: 2018-02-05

## 2018-02-05 DIAGNOSIS — K51.00 ULCERATIVE PANCOLITIS WITHOUT COMPLICATION (H): ICD-10-CM

## 2018-02-05 LAB
BASOPHILS # BLD AUTO: 0 10E9/L (ref 0–0.2)
BASOPHILS NFR BLD AUTO: 0.9 %
CRP SERPL-MCNC: <2.9 MG/L (ref 0–8)
DIFFERENTIAL METHOD BLD: NORMAL
EOSINOPHIL # BLD AUTO: 0.2 10E9/L (ref 0–0.7)
EOSINOPHIL NFR BLD AUTO: 3.4 %
ERYTHROCYTE [DISTWIDTH] IN BLOOD BY AUTOMATED COUNT: 12 % (ref 10–15)
ERYTHROCYTE [SEDIMENTATION RATE] IN BLOOD BY WESTERGREN METHOD: 3 MM/H (ref 0–15)
HCT VFR BLD AUTO: 44.2 % (ref 40–53)
HGB BLD-MCNC: 15.2 G/DL (ref 13.3–17.7)
LYMPHOCYTES # BLD AUTO: 2.1 10E9/L (ref 0.8–5.3)
LYMPHOCYTES NFR BLD AUTO: 47.5 %
MCH RBC QN AUTO: 29.2 PG (ref 26.5–33)
MCHC RBC AUTO-ENTMCNC: 34.4 G/DL (ref 31.5–36.5)
MCV RBC AUTO: 85 FL (ref 78–100)
MONOCYTES # BLD AUTO: 0.3 10E9/L (ref 0–1.3)
MONOCYTES NFR BLD AUTO: 7.7 %
NEUTROPHILS # BLD AUTO: 1.8 10E9/L (ref 1.6–8.3)
NEUTROPHILS NFR BLD AUTO: 40.5 %
PLATELET # BLD AUTO: 188 10E9/L (ref 150–450)
RBC # BLD AUTO: 5.21 10E12/L (ref 4.4–5.9)
WBC # BLD AUTO: 4.4 10E9/L (ref 4–11)

## 2018-02-05 PROCEDURE — 36415 COLL VENOUS BLD VENIPUNCTURE: CPT | Performed by: INTERNAL MEDICINE

## 2018-02-05 PROCEDURE — 86140 C-REACTIVE PROTEIN: CPT | Performed by: INTERNAL MEDICINE

## 2018-02-05 PROCEDURE — 80076 HEPATIC FUNCTION PANEL: CPT | Performed by: INTERNAL MEDICINE

## 2018-02-05 PROCEDURE — 85025 COMPLETE CBC W/AUTO DIFF WBC: CPT | Performed by: INTERNAL MEDICINE

## 2018-02-05 PROCEDURE — 85652 RBC SED RATE AUTOMATED: CPT | Performed by: INTERNAL MEDICINE

## 2018-02-06 LAB
ALBUMIN SERPL-MCNC: 4.3 G/DL (ref 3.4–5)
ALP SERPL-CCNC: 59 U/L (ref 40–150)
ALT SERPL W P-5'-P-CCNC: 61 U/L (ref 0–70)
AST SERPL W P-5'-P-CCNC: 52 U/L (ref 0–45)
BILIRUB DIRECT SERPL-MCNC: 0.1 MG/DL (ref 0–0.2)
BILIRUB SERPL-MCNC: 0.5 MG/DL (ref 0.2–1.3)
PROT SERPL-MCNC: 7.4 G/DL (ref 6.8–8.8)

## 2018-06-06 ASSESSMENT — ENCOUNTER SYMPTOMS
DIFFICULTY URINATING: 0
MUSCLE CRAMPS: 0
FLANK PAIN: 0
LOSS OF CONSCIOUSNESS: 0
NECK PAIN: 0
NAIL CHANGES: 0
DISTURBANCES IN COORDINATION: 0
CHILLS: 0
DECREASED APPETITE: 0
SEIZURES: 0
NIGHT SWEATS: 0
MEMORY LOSS: 0
SPEECH CHANGE: 0
WEIGHT GAIN: 0
ALTERED TEMPERATURE REGULATION: 0
FATIGUE: 1
INCREASED ENERGY: 0
TINGLING: 0
NUMBNESS: 0
PARALYSIS: 0
WEAKNESS: 0
HALLUCINATIONS: 0
DYSURIA: 0
ARTHRALGIAS: 1
DIZZINESS: 0
MUSCLE WEAKNESS: 0
FEVER: 0
JOINT SWELLING: 0
WEIGHT LOSS: 0
TREMORS: 0
MYALGIAS: 0
STIFFNESS: 1
BACK PAIN: 0
SKIN CHANGES: 0
HEMATURIA: 0
HEADACHES: 1
POLYPHAGIA: 0
POOR WOUND HEALING: 0
POLYDIPSIA: 0

## 2018-06-12 ENCOUNTER — OFFICE VISIT (OUTPATIENT)
Dept: GASTROENTEROLOGY | Facility: CLINIC | Age: 28
End: 2018-06-12
Payer: COMMERCIAL

## 2018-06-12 VITALS
OXYGEN SATURATION: 98 % | DIASTOLIC BLOOD PRESSURE: 72 MMHG | BODY MASS INDEX: 27.01 KG/M2 | SYSTOLIC BLOOD PRESSURE: 124 MMHG | TEMPERATURE: 98.8 F | WEIGHT: 203.8 LBS | RESPIRATION RATE: 18 BRPM | HEIGHT: 73 IN | HEART RATE: 66 BPM

## 2018-06-12 DIAGNOSIS — K51.00 ULCERATIVE PANCOLITIS WITHOUT COMPLICATION (H): ICD-10-CM

## 2018-06-12 DIAGNOSIS — K51.00 ULCERATIVE PANCOLITIS WITHOUT COMPLICATION (H): Primary | ICD-10-CM

## 2018-06-12 LAB
ALBUMIN SERPL-MCNC: 4.4 G/DL (ref 3.4–5)
ALP SERPL-CCNC: 54 U/L (ref 40–150)
ALT SERPL W P-5'-P-CCNC: 33 U/L (ref 0–70)
AST SERPL W P-5'-P-CCNC: 91 U/L (ref 0–45)
BASOPHILS # BLD AUTO: 0 10E9/L (ref 0–0.2)
BASOPHILS NFR BLD AUTO: 0.6 %
BILIRUB DIRECT SERPL-MCNC: 0.2 MG/DL (ref 0–0.2)
BILIRUB SERPL-MCNC: 0.7 MG/DL (ref 0.2–1.3)
CRP SERPL-MCNC: <2.9 MG/L (ref 0–8)
DIFFERENTIAL METHOD BLD: NORMAL
EOSINOPHIL # BLD AUTO: 0.1 10E9/L (ref 0–0.7)
EOSINOPHIL NFR BLD AUTO: 1.8 %
ERYTHROCYTE [DISTWIDTH] IN BLOOD BY AUTOMATED COUNT: 11.9 % (ref 10–15)
ERYTHROCYTE [SEDIMENTATION RATE] IN BLOOD BY WESTERGREN METHOD: 3 MM/H (ref 0–15)
HCT VFR BLD AUTO: 42 % (ref 40–53)
HGB BLD-MCNC: 14.6 G/DL (ref 13.3–17.7)
IMM GRANULOCYTES # BLD: 0 10E9/L (ref 0–0.4)
IMM GRANULOCYTES NFR BLD: 0.6 %
LYMPHOCYTES # BLD AUTO: 2.3 10E9/L (ref 0.8–5.3)
LYMPHOCYTES NFR BLD AUTO: 31.5 %
MCH RBC QN AUTO: 29.6 PG (ref 26.5–33)
MCHC RBC AUTO-ENTMCNC: 34.8 G/DL (ref 31.5–36.5)
MCV RBC AUTO: 85 FL (ref 78–100)
MONOCYTES # BLD AUTO: 0.5 10E9/L (ref 0–1.3)
MONOCYTES NFR BLD AUTO: 6.9 %
NEUTROPHILS # BLD AUTO: 4.2 10E9/L (ref 1.6–8.3)
NEUTROPHILS NFR BLD AUTO: 58.6 %
NRBC # BLD AUTO: 0 10*3/UL
NRBC BLD AUTO-RTO: 0 /100
PLATELET # BLD AUTO: 208 10E9/L (ref 150–450)
PROT SERPL-MCNC: 7.7 G/DL (ref 6.8–8.8)
RBC # BLD AUTO: 4.93 10E12/L (ref 4.4–5.9)
WBC # BLD AUTO: 7.2 10E9/L (ref 4–11)

## 2018-06-12 ASSESSMENT — PAIN SCALES - GENERAL: PAINLEVEL: NO PAIN (0)

## 2018-06-12 NOTE — PROGRESS NOTES
IBD CLINIC VISIT     CC/REFERRING MD:  Self referred  REASON FOR FOLLOW UP: Ulcerative colitis    IBD HISTORY  Age at diagnosis: 26, 2017  Extent of disease: Pancolitis  Current UC medications: Adalimumab (started Nov, 2017)  Prior UC surgeries: None  Prior IBD Medications:   Lialda - flu like illness with fevers, abdominal pains  Apriso - flu like illlness  Sulfasalazine - did not control symptoms    DRUG MONITORING  TPMT enzyme activity: Has not been obtained    6-TGN/6-MMPN levels: --    Biologic concentration:--    DISEASE ASSESSMENT  Labs:  CRP, ESR Results  Recent Labs   Lab Test  02/05/18   1053  10/20/17   0907   CRP  <2.9  6.6   SED  3  7     Lab Results   Component Value Date    ALBUMIN 3.7 10/20/2017     Endoscopic assessment: Colonoscopy on 02/16/2017 revealed continuous erythema throughout the entire colon and patchy inflammation within the ileum.  Biopsies in the duodenum did not show any evidence of celiac.  Biopsies from the terminal ileum showed nonspecific acute ileitis presumed to be backwash ileitis.  Random colon biopsies showed moderate active chronic colitis consistent with inflammatory bowel disease.   Enterography: --  Fecal calprotectin: --   C diff: Negative 10/2017  pMayo score: 0/9    ASSESSMENT/PLAN  This is a 27-year-old male with a history of ulcerative pancolitis who was recently started on adalimumab therapy.      1.  Ulcerative pancolitis: He is now in steroid free clinical and endoscopic remission on adalimumab monotherapy.  Is doing extremely well and we should work to maintain his response.  Discussed the need for compliance to medications.  Discussed the risk of antibody formation.  Discussed proactive therapeutic drug monitoring.  Will obtain an adalimumab trough level today.  -- Continue adalimumab at current dose  -- Blood work every 3-4 months on adalimumab: CBC and LFTs  -- Adalimumab trough level    2.  Joint pain: substantially improved and likely related to his  underlying IBD.  Ongoing joint arthralgias potentially related to physical activity.  Will continue to observe for now.      IBD healthcare maintenance based on patients current medication:  Anti-TNF medication therapy vs Anti-integrin therapy      Vaccinations:  -- Influenza (every year)  -- TdaP (every 10 years)  -- Pneumococcal Pneumonia (once then every 5 years)  -- Yearly assessment for latent Tb (verbal screening and exam, PPD or QuantiFERON-Tb testing): Normal chest xray 3/12/17    One time confirmation of immunity or serologies:  -- Hepatitis A (serologies or immunizations): Hep A Ab, Total was nonreactive ([facility])  -- Hepatitis B (serologies or immunizations): immune by serologies ([facility])  -- Varicella: Had chickenpox as a child  -- MMR: Not documented  -- HPV (all aged 18-26): Not documented  -- Meningococcal meningitis (all patients at risk for meningitis): 2005   -- Due to the immunosuppression in this patient, I would not advise administration of live vaccines such as varicella/VZV, intranasal influenza, MMR, or yellow fever vaccine (if travelling).      Bone mineral density screening   -- Recommend all patients supplement with calcium and vitamin D  -- Given prior steroid use recommend DEXA if not already done    Cancer Screening:  Colon cancer screening:  Given pancolitis colonoscopy every 1-3 years recommended after 8 years of disease.  Dysplasia screening is recommended 2024.    Cervical cancer screening: N/A    Skin cancer screening: Annual visual exam of skin by dermatologist since patient is immunocompromised    Depression Screening:  -- Over the last month, have you felt down, depressed, or hopeless? No  -- Over the last month, have you felt little interest or pleasure doing things? No    Misc:  -- Avoid tobacco use  -- Avoid NSAIDs as there is potentially a 25% chance of causing an IBD flare    RTC 6 months    Thank you for this consultation.  It was a pleasure to participate in the care of this  patient; please contact us with any further questions.      Gordo Escobedo MD    [facility]  Inflammatory Bowel Disease Program  Division of Gastroenterology, Hepatology and Nutrition    HPI:   This is a 28-year-old male with a history of ulcerative colitis who is here today for follow-up.  He is doing extremely well on adalimumab monotherapy.  He was able to taper off Uceris in the October of 2017.  Flexible sigmoidoscopy in the Feb of 2018 demonstrated no active colitis.  Clinically he is having one formed stool a day with no blood and no urgency.  He has no nausea vomiting abdominal pain.  He has no dietary restrictions.  He has moved to [place] and is now living in [place].  He does occasionally have some arthralgias in his knees and wrists he attributes this to occasionally working at home or working out.  He is in no evidence of arthritis.    ROS:    No fevers or chills  No weight loss  No blurry vision, double vision or change in vision  No sore throat  No lymphadenopathy  No headache, paraesthesias, or weakness in a limb  No shortness of breath or wheezing  No chest pain or pressure  No arthralgias or myalgias  No rashes or skin changes  No odynophagia or dysphagia  No BRBPR, hematochezia, melena  No dysuria, frequency or urgency  No hot/cold intolerance or polyria  No anxiety or depression    Extra intestinal manifestations of IBD:  No uveitis/episcleritis  No aphthous ulcers   No arthritis   No erythema nodosum/pyoderma gangrenosum.     PERTINENT PAST MEDICAL HISTORY:  See HPI    PREVIOUS SURGERIES:  None    ALLERGIES:   No Known Allergies    PERTINENT MEDICATIONS:    Current Outpatient Prescriptions:      adalimumab (HUMIRA PEN) 40 MG/0.8ML pen kit, Inject 0.8 mLs (40 mg) Subcutaneous every 14 days, Disp: 1.6 mL, Rfl: 11     [DISCONTINUED] adalimumab (HUMIRA PEN-CROHNS STARTER) 40 MG/0.8ML pen kit, on day 1,  160mg (4-40mg). Day 15 take 80mg (2-40mg). Starting on day  "29 and every other week take 40mg., Disp: 6 each, Rfl: 0    SOCIAL HISTORY:  Social History     Social History     Marital status: Single     Spouse name: N/A     Number of children: N/A     Years of education: N/A     Occupational History     Not on file.     Social History Main Topics     Smoking status: Never Smoker     Smokeless tobacco: Never Used     Alcohol use No     Drug use: No     Sexual activity: No     Other Topics Concern     Not on file     Social History Narrative     FAMILY HISTORY:  No CRC, no IBD  Past/family/social history reviewed and no changes    PHYSICAL EXAMINATION:  Constitutional: aaox3, cooperative, pleasant, not dyspneic/diaphoretic, no acute distress  Vitals reviewed: /72 (BP Location: Left arm, Patient Position: Sitting, Cuff Size: Adult Regular)  Pulse 66  Temp 98.8  F (37.1  C) (Oral)  Resp 18  Ht 1.854 m (6' 1\")  Wt 92.4 kg (203 lb 12.8 oz)  SpO2 98%  BMI 26.89 kg/m2  Wt:   Wt Readings from Last 2 Encounters:   06/12/18 92.4 kg (203 lb 12.8 oz)   11/21/17 87.4 kg (192 lb 11.2 oz)      Eyes: Sclera anicteric/injected  Ears/nose/mouth/throat: Normal oropharynx without ulcers or exudate, mucus membranes moist, hearing intact  Neck: supple, thyroid normal size  CV: No edema  Respiratory: Unlabored breathing  Lymph: No axillary, submandibular, supraclavicular or inguinal lymphadenopathy  Abd: Nondistended, +bs, no hepatosplenomegaly, nontender, no peritoneal signs  Skin: warm, perfused, no jaundice  Psych: Normal affect  MSK: Normal gait      PERTINENT STUDIES:  Most recent CBC:  Recent Labs   Lab Test  02/05/18   1053  10/20/17   0907   WBC  4.4  5.4   HGB  15.2  13.1*   HCT  44.2  39.4*   PLT  188  241     Most recent hepatic panel:  Recent Labs   Lab Test  02/05/18   1053  10/20/17   0907   ALT  61  21   AST  52*  11     Most recent creatinine:  Recent Labs   Lab Test  03/12/17   1418   CR  0.99       Answers for HPI/ROS submitted by the patient on 6/6/2018   General " Symptoms: Yes  Skin Symptoms: Yes  HENT Symptoms: No  EYE SYMPTOMS: No  HEART SYMPTOMS: No  LUNG SYMPTOMS: No  INTESTINAL SYMPTOMS: No  URINARY SYMPTOMS: Yes  REPRODUCTIVE SYMPTOMS: No  SKELETAL SYMPTOMS: Yes  BLOOD SYMPTOMS: No  NERVOUS SYSTEM SYMPTOMS: Yes  MENTAL HEALTH SYMPTOMS: No  Fever: No  Loss of appetite: No  Weight loss: No  Weight gain: No  Fatigue: Yes  Night sweats: No  Chills: No  Increased stress: No  Excessive hunger: No  Excessive thirst: No  Feeling hot or cold when others believe the temperature is normal: No  Loss of height: No  Post-operative complications: No  Surgical site pain: No  Hallucinations: No  Change in or Loss of Energy: No  Hyperactivity: No  Confusion: No  Changes in hair: No  Changes in moles/birth marks: No  Itching: No  Rashes: No  Changes in nails: No  Acne: No  Change in facial hair: No  Warts: No  Non-healing sores: No  Scarring: No  Flaking of skin: No  Color changes of hands/feet in cold : No  Sun sensitivity: No  Skin thickening: No  Trouble holding urine or incontinence: Yes  Pain or burning: No  Trouble starting or stopping: Yes  Increased frequency of urination: No  Blood in urine: No  Decreased frequency of urination: No  Frequent nighttime urination: No  Flank pain: No  Difficulty emptying bladder: No  Back pain: No  Muscle aches: No  Neck pain: No  Swollen joints: No  Joint pain: Yes  Bone pain: No  Muscle cramps: No  Muscle weakness: No  Joint stiffness: Yes  Bone fracture: No  Trouble with coordination: No  Dizziness or trouble with balance: No  Fainting or black-out spells: No  Memory loss: No  Headache: Yes  Seizures: No  Speech problems: No  Tingling: No  Tremor: No  Weakness: No  Difficulty walking: No  Paralysis: No  Numbness: No

## 2018-06-12 NOTE — LETTER
6/12/2018       RE: Kyle Coelho  [address]  [facility] [address]     Dear Colleague,    Thank you for referring your patient, Kyle Coelho, to the [facility] GASTROENTEROLOGY AND IBD CLINIC at [facility]. Please see a copy of my visit note below.    IBD CLINIC VISIT     CC/REFERRING MD:  Self referred  REASON FOR FOLLOW UP: Ulcerative colitis    IBD HISTORY  Age at diagnosis: 26, 2017  Extent of disease: Pancolitis  Current UC medications: Adalimumab (started Nov, 2017)  Prior UC surgeries: None  Prior IBD Medications:   Lialda - flu like illness with fevers, abdominal pains  Apriso - flu like illlness  Sulfasalazine - did not control symptoms    DRUG MONITORING  TPMT enzyme activity: Has not been obtained    6-TGN/6-MMPN levels: --    Biologic concentration:--    DISEASE ASSESSMENT  Labs:  CRP, ESR Results  Recent Labs   Lab Test  02/05/18   1053  10/20/17   0907   CRP  <2.9  6.6   SED  3  7     Lab Results   Component Value Date    ALBUMIN 3.7 10/20/2017     Endoscopic assessment: Colonoscopy on 02/16/2017 revealed continuous erythema throughout the entire colon and patchy inflammation within the ileum.  Biopsies in the duodenum did not show any evidence of celiac.  Biopsies from the terminal ileum showed nonspecific acute ileitis presumed to be backwash ileitis.  Random colon biopsies showed moderate active chronic colitis consistent with inflammatory bowel disease.   Enterography: --  Fecal calprotectin: --   C diff: Negative 10/2017  pMayo score: 0/9    ASSESSMENT/PLAN  This is a 27-year-old male with a history of ulcerative pancolitis who was recently started on adalimumab therapy.      1.  Ulcerative pancolitis: He is now in steroid free clinical and endoscopic remission on adalimumab monotherapy.  Is doing extremely well and we should work to maintain his response.  Discussed the need for compliance to medications.  Discussed the risk of antibody  formation.  Discussed proactive therapeutic drug monitoring.  Will obtain an adalimumab trough level today.  -- Continue adalimumab at current dose  -- Blood work every 3-4 months on adalimumab: CBC and LFTs  -- Adalimumab trough level    2.  Joint pain: substantially improved and likely related to his underlying IBD.  Ongoing joint arthralgias potentially related to physical activity.  Will continue to observe for now.      IBD healthcare maintenance based on patients current medication:  Anti-TNF medication therapy vs Anti-integrin therapy      Vaccinations:  -- Influenza (every year)  -- TdaP (every 10 years)  -- Pneumococcal Pneumonia (once then every 5 years)  -- Yearly assessment for latent Tb (verbal screening and exam, PPD or QuantiFERON-Tb testing): Normal chest xray 3/12/17    One time confirmation of immunity or serologies:  -- Hepatitis A (serologies or immunizations): Hep A Ab, Total was nonreactive ([facility])  -- Hepatitis B (serologies or immunizations): immune by serologies ([facility])  -- Varicella: Had chickenpox as a child  -- MMR: Not documented  -- HPV (all aged 18-26): Not documented  -- Meningococcal meningitis (all patients at risk for meningitis): 2005   -- Due to the immunosuppression in this patient, I would not advise administration of live vaccines such as varicella/VZV, intranasal influenza, MMR, or yellow fever vaccine (if travelling).      Bone mineral density screening   -- Recommend all patients supplement with calcium and vitamin D  -- Given prior steroid use recommend DEXA if not already done    Cancer Screening:  Colon cancer screening:  Given pancolitis colonoscopy every 1-3 years recommended after 8 years of disease.  Dysplasia screening is recommended 2024.    Cervical cancer screening: N/A    Skin cancer screening: Annual visual exam of skin by dermatologist since patient is immunocompromised    Depression Screening:  -- Over the last month, have you felt down, depressed, or hopeless?  No  -- Over the last month, have you felt little interest or pleasure doing things? No    Misc:  -- Avoid tobacco use  -- Avoid NSAIDs as there is potentially a 25% chance of causing an IBD flare    RTC 6 months    Thank you for this consultation.  It was a pleasure to participate in the care of this patient; please contact us with any further questions.      Gordo Escobedo MD    [facility]  Inflammatory Bowel Disease Program  Division of Gastroenterology, Hepatology and Nutrition    HPI:   This is a 28-year-old male with a history of ulcerative colitis who is here today for follow-up.  He is doing extremely well on adalimumab monotherapy.  He was able to taper off Uceris in the October of 2017.  Flexible sigmoidoscopy in the Feb of 2018 demonstrated no active colitis.  Clinically he is having one formed stool a day with no blood and no urgency.  He has no nausea vomiting abdominal pain.  He has no dietary restrictions.  He has moved to [place] and is now living in [place].  He does occasionally have some arthralgias in his knees and wrists he attributes this to occasionally working at home or working out.  He is in no evidence of arthritis.    ROS:    No fevers or chills  No weight loss  No blurry vision, double vision or change in vision  No sore throat  No lymphadenopathy  No headache, paraesthesias, or weakness in a limb  No shortness of breath or wheezing  No chest pain or pressure  No arthralgias or myalgias  No rashes or skin changes  No odynophagia or dysphagia  No BRBPR, hematochezia, melena  No dysuria, frequency or urgency  No hot/cold intolerance or polyria  No anxiety or depression    Extra intestinal manifestations of IBD:  No uveitis/episcleritis  No aphthous ulcers   No arthritis   No erythema nodosum/pyoderma gangrenosum.     PERTINENT PAST MEDICAL HISTORY:  See HPI    PREVIOUS SURGERIES:  None    ALLERGIES:   No Known Allergies    PERTINENT  "MEDICATIONS:    Current Outpatient Prescriptions:      adalimumab (HUMIRA PEN) 40 MG/0.8ML pen kit, Inject 0.8 mLs (40 mg) Subcutaneous every 14 days, Disp: 1.6 mL, Rfl: 11     [DISCONTINUED] adalimumab (HUMIRA PEN-CROHNS STARTER) 40 MG/0.8ML pen kit, on day 1,  160mg (4-40mg). Day 15 take 80mg (2-40mg). Starting on day 29 and every other week take 40mg., Disp: 6 each, Rfl: 0    SOCIAL HISTORY:  Social History     Social History     Marital status: Single     Spouse name: N/A     Number of children: N/A     Years of education: N/A     Occupational History     Not on file.     Social History Main Topics     Smoking status: Never Smoker     Smokeless tobacco: Never Used     Alcohol use No     Drug use: No     Sexual activity: No     Other Topics Concern     Not on file     Social History Narrative     FAMILY HISTORY:  No CRC, no IBD  Past/family/social history reviewed and no changes    PHYSICAL EXAMINATION:  Constitutional: aaox3, cooperative, pleasant, not dyspneic/diaphoretic, no acute distress  Vitals reviewed: /72 (BP Location: Left arm, Patient Position: Sitting, Cuff Size: Adult Regular)  Pulse 66  Temp 98.8  F (37.1  C) (Oral)  Resp 18  Ht 1.854 m (6' 1\")  Wt 92.4 kg (203 lb 12.8 oz)  SpO2 98%  BMI 26.89 kg/m2  Wt:   Wt Readings from Last 2 Encounters:   06/12/18 92.4 kg (203 lb 12.8 oz)   11/21/17 87.4 kg (192 lb 11.2 oz)      Eyes: Sclera anicteric/injected  Ears/nose/mouth/throat: Normal oropharynx without ulcers or exudate, mucus membranes moist, hearing intact  Neck: supple, thyroid normal size  CV: No edema  Respiratory: Unlabored breathing  Lymph: No axillary, submandibular, supraclavicular or inguinal lymphadenopathy  Abd: Nondistended, +bs, no hepatosplenomegaly, nontender, no peritoneal signs  Skin: warm, perfused, no jaundice  Psych: Normal affect  MSK: Normal gait      PERTINENT STUDIES:  Most recent CBC:  Recent Labs   Lab Test  02/05/18   1053  10/20/17   0907   WBC  4.4  5.4   HGB  " 15.2  13.1*   HCT  44.2  39.4*   PLT  188  241     Most recent hepatic panel:  Recent Labs   Lab Test  02/05/18   1053  10/20/17   0907   ALT  61  21   AST  52*  11     Most recent creatinine:  Recent Labs   Lab Test  03/12/17   1418   CR  0.99       Again, thank you for allowing me to participate in the care of your patient.      Sincerely,    Gordo Escobedo MD

## 2018-06-12 NOTE — NURSING NOTE
"Chief Complaint   Patient presents with     RECHECK     Pt is here for a 6 follow up for UMP       Vitals:    06/12/18 0847   BP: 124/72   BP Location: Left arm   Patient Position: Sitting   Cuff Size: Adult Regular   Pulse: 66   Resp: 18   Temp: 98.8  F (37.1  C)   TempSrc: Oral   SpO2: 98%   Weight: 203 lb 12.8 oz   Height: 6' 1\"       Body mass index is 26.89 kg/(m^2).      STANLEY Valdovinos, EMT                    "

## 2018-06-12 NOTE — PATIENT INSTRUCTIONS
1. Blood work today. Repeat in 2 months ( OK to complete at [facility])  2. Check Humira level  3. Enroll in ambassador program  4. Follow up in GI clinic in 6 months.

## 2018-06-12 NOTE — MR AVS SNAPSHOT
After Visit Summary   6/12/2018    Kyle Coelho    MRN: [id number]           Patient Information     Date Of Birth          1990        Visit Information        Provider Department      6/12/2018 8:40 AM Gordo Escobedo MD [facility] Gastroenterology and IBD Clinic        Today's Diagnoses     Ulcerative pancolitis without complication (H)    -  1      Care Instructions    1. Blood work today. Repeat in 2 months ( OK to complete at [facility])  2. Check Humira level  3. Enroll in ambassador program  4. Follow up in GI clinic in 6 months.           Follow-ups after your visit        Your next 10 appointments already scheduled     Jun 12, 2018  9:45 AM CDT   LAB with  LAB   [facility] Lab ([facility])    [address] [place] [address]  [address] [facility] [id number] [phone]           Please do not eat 10-12 hours before your appointment if you are coming in fasting for labs on lipids, cholesterol, or glucose (sugar). This does not apply to pregnant women. Water, hot tea and black coffee (with nothing added) are okay. Do not drink other fluids, diet soda or chew gum.            Jul 20, 2018 10:00 AM CDT   (Arrive by 9:45 AM)   New Patient Visit with Ricardo Henderson MD   [facility] Dermatology ([facility])    [address] [place] [address]  [address] [facility] [id number] [phone]            Dec 03, 2018  8:20 AM CST   (Arrive by 8:05 AM)   RETURN INFLAMMATORY BOWEL DISEASE with Miguel Turner PA-C   [facility] Gastroenterology and IBD Clinic ([facility])    [address] [place] [address]  [address] [facility] [id number] [phone]              Future tests that were ordered for you today     Open Standing Orders        Priority Remaining Interval Expires Ordered    Erythrocyte sedimentation rate auto Routine 2/2  6/12/2019 6/12/2018    CRP inflammation Routine 2/2  6/12/2019 6/12/2018    Hepatic panel Routine 2/2  6/12/2019  "6/12/2018    CBC with platelets differential Routine 2/2 6/12/2019 6/12/2018          Open Future Orders        Priority Expected Expires Ordered    Humira Level- miraca: Laboratory Miscellaneous Order Routine  6/12/2019 6/12/2018            Who to contact     Please call your clinic at [phone] to:    Ask questions about your health    Make or cancel appointments    Discuss your medicines    Learn about your test results    Speak to your doctor            Additional Information About Your Visit        [employer] Information     [employer] gives you secure access to your electronic health record. If you see a primary care provider, you can also send messages to your care team and make appointments. If you have questions, please call your primary care clinic.  If you do not have a primary care provider, please call [phone] and they will assist you.      [employer] is an electronic gateway that provides easy, online access to your medical records. With [employer], you can request a clinic appointment, read your test results, renew a prescription or communicate with your care team.     To access your existing account, please contact your [facility] or call [phone] for assistance.        Care EveryWhere ID     This is your Care EveryWhere ID. This could be used by other organizations to access your [facility] medical records  [id number]        Your Vitals Were     Pulse Temperature Respirations Height Pulse Oximetry BMI (Body Mass Index)    66 98.8  F (37.1  C) (Oral) 18 1.854 m (6' 1\") 98% 26.89 kg/m2       Blood Pressure from Last 3 Encounters:   06/12/18 124/72   11/21/17 128/78   10/20/17 136/79    Weight from Last 3 Encounters:   06/12/18 92.4 kg (203 lb 12.8 oz)   11/21/17 87.4 kg (192 lb 11.2 oz)   10/20/17 86.9 kg (191 lb 9.6 oz)               Primary Care Provider Office Phone # Fax #    Gabriel Ibrahim [phone]       [facility] [address] [facility] [address] [place] [address]        Equal Access to Services     [facility] CampusMIC[facility] : Hadii aad ku hadjellyemily Deal, yvonne stringer, negar wilson. So [facility] [phone].    ATENCIÓN: Si habla español, tiene a mabry disposición servicios gratuitos de asistencia lingüística. [facility] [phone].    We comply with applicable federal civil rights laws and [place] laws. We do not discriminate on the basis of race, color, national origin, age, disability, sex, sexual orientation, or gender identity.            Thank you!     Thank you for choosing [facility] GASTROENTEROLOGY AND IBD CLINIC  for your care. Our goal is always to provide you with excellent care. Hearing back from our patients is one way we can continue to improve our services. Please take a few minutes to complete the written survey that you may receive in the mail after your visit with us. Thank you!             Your Updated Medication List - Protect others around you: Learn how to safely use, store and throw away your medicines at www.disposemymeds.org.          This list is accurate as of 6/12/18  9:24 AM.  Always use your most recent med list.                   Brand Name Dispense Instructions for use Diagnosis    adalimumab 40 MG/0.8ML pen kit    HUMIRA PEN    1.6 mL    Inject 0.8 mLs (40 mg) Subcutaneous every 14 days    Ulcerative rectosigmoiditis without complication (H)

## 2018-06-13 LAB
MISCELLANEOUS TEST: NORMAL
TEST NAME: NORMAL

## 2018-06-18 LAB — LAB SCANNED RESULT: NORMAL

## 2018-07-13 ENCOUNTER — TELEPHONE (OUTPATIENT)
Dept: DERMATOLOGY | Facility: CLINIC | Age: 28
End: 2018-07-13

## 2018-07-13 NOTE — TELEPHONE ENCOUNTER
Dermatology Pre-visit Call:    Reason for visit : Skin check     Any personal history of skin cancers: No    Was the patient referred: Yes    If the patient was referred, are records obtained: EPIC. (If no, then obtain records).    Has the patient seen a dermatologist in the past: No. (If yes, obtain records)    Patient Reminders Given:  --Please, make sure you bring an updated list of your medications.   --Plan on being in our facility for approximately one hour, this includes the registration process, office visit, education and check-out process.  If you are having a procedure, more time may be required.     --If you are having a procedure, please, present 15 minutes early.  --Location reviewed.   --If you need to cancel or reschedule, call XXXX  --We look forward to seeing you in Dermatology Clinic.       Left voicemail regarding appointment on 7/20/18 at 1000. Call back number provided for questions or rescheduling.

## 2018-07-20 ENCOUNTER — OFFICE VISIT (OUTPATIENT)
Dept: DERMATOLOGY | Facility: CLINIC | Age: 28
End: 2018-07-20
Payer: COMMERCIAL

## 2018-07-20 DIAGNOSIS — D22.9 MELANOCYTIC NEVUS, UNSPECIFIED LOCATION: Primary | ICD-10-CM

## 2018-07-20 ASSESSMENT — PATIENT HEALTH QUESTIONNAIRE - PHQ9
SUM OF ALL RESPONSES TO PHQ QUESTIONS 1-9: 1
SUM OF ALL RESPONSES TO PHQ QUESTIONS 1-9: 1

## 2018-07-20 ASSESSMENT — PAIN SCALES - GENERAL: PAINLEVEL: NO PAIN (0)

## 2018-07-20 NOTE — NURSING NOTE
Chief Complaint   Patient presents with     Skin Check     Kyle is here for a skin check and referred form Dr. Escobedo of the GI clinic. He states that he doesn't have any areas of concern.        Macrina Kulkarni, EMT

## 2018-07-20 NOTE — PROGRESS NOTES
[facility] Dermatology Note      Dermatology Problem List:  1. FBSE on Humira, last 7/20/18    Encounter Date: Jul 20, 2018    CC:  Chief Complaint   Patient presents with     Skin Check     Kyle is here for a skin check and referred form Dr. Escobedo of the GI clinic. He states that he doesn't have any areas of concern.          History of Present Illness:  Mr. Kyle Coelho is a 28 year old male who presents as a referral from Dr. Escobedo for an annual skin check as patient is on Humira for Ulcerative colitis. Wore sunscreen regularly as child and wears this now also (SPF 50). Denies wearing hats or sun protective clothing. Today he denies any skin concerns including changing/growing moles, areas that repeatedly bleed, or new moles.     Past Medical History:   There is no problem list on file for this patient.    History reviewed. No pertinent past medical history.  History reviewed. No pertinent surgical history.    Social History:  The patient works as an  (mostly office setting). The patient denies use of tanning beds.    Family History:  No family h/o skin cancers    Medications:  Current Outpatient Prescriptions   Medication Sig Dispense Refill     adalimumab (HUMIRA PEN) 40 MG/0.8ML pen kit Inject 0.8 mLs (40 mg) Subcutaneous every 14 days 1.6 mL 11     No Known Allergies      Review of Systems:  -Constitutional: The patient denies fatigue, fevers, chills, unintended weight loss, and night sweats.  -HEENT: Patient denies nonhealing oral sores.  -Skin: As above in HPI. No additional skin concerns.    Physical exam:  Vitals: There were no vitals taken for this visit.  GEN: This is a well developed, well-nourished male in no acute distress, in a pleasant mood.    SKIN: Full skin, which includes the head/face, both arms, chest, back, abdomen,both legs, genitalia and/or groin buttocks, digits and/or nails, was examined.  -Several scattered light brown dermal junctional nevi  throughout  -One 2mm cherry angioma upper back  -1 cm linear crusted healing laceration to right patella region  -  -No other lesions of concern on areas examined.     Impression/Plan:  1. Benign nevi, no concerning areas on FBSE today    Benign nature was discussed.No further intervention required at this time.     ABCDs of melanoma were discussed and self skin checks were advised.     Sun precaution was advised including the use of sun screens of SPF 30 or higher, sun protective clothing, and avoidance of tanning beds.      CC Dr. Gordo Escobedo on close of this encounter.  Follow-up in 2 years, earlier for new or changing lesions.     Staff Involved:  Scribed by Anaid Garner, MS4 for Dr. Ricardo Henderson.  Staff Physician Comments:  I was present with the medical student who participated in the service and in the documentation of the note. I have verified the history and personally performed the physical exam and medical decision making. I agree with the assessment and plan as documented in the note. I have reviewed and if necessary amended the note.      Ricardo Henderson MD  Professor  Head of Dermato-Allergy Division  Department of Dermatology  [facility]      Answers for HPI/ROS submitted by the patient on 7/20/2018   PHQ-2 Score: 3  PHQ9 TOTAL SCORE: 1

## 2018-07-20 NOTE — LETTER
7/20/2018       RE: Kyle Coelho  [address]  [facility] [address]     Dear Colleague,    Thank you for referring your patient, Kyle Coelho, to the [facility] DERMATOLOGY at [facility]. Please see a copy of my visit note below.    [facility] Dermatology Note      Dermatology Problem List:  1. FBSE on Humira, last 7/20/18    Encounter Date: Jul 20, 2018    CC:  Chief Complaint   Patient presents with     Skin Check     Kyle is here for a skin check and referred form Dr. Escobedo of the GI clinic. He states that he doesn't have any areas of concern.          History of Present Illness:  Mr. Kyle Coelho is a 28 year old male who presents as a referral from Dr. Escobedo for an annual skin check as patient is on Humira for Ulcerative colitis. Wore sunscreen regularly as child and wears this now also (SPF 50). Denies wearing hats or sun protective clothing. Today he denies any skin concerns including changing/growing moles, areas that repeatedly bleed, or new moles.     Past Medical History:   There is no problem list on file for this patient.    History reviewed. No pertinent past medical history.  History reviewed. No pertinent surgical history.    Social History:  The patient works as an  (mostly office setting). The patient denies use of tanning beds.    Family History:  No family h/o skin cancers    Medications:  Current Outpatient Prescriptions   Medication Sig Dispense Refill     adalimumab (HUMIRA PEN) 40 MG/0.8ML pen kit Inject 0.8 mLs (40 mg) Subcutaneous every 14 days 1.6 mL 11     No Known Allergies      Review of Systems:  -Constitutional: The patient denies fatigue, fevers, chills, unintended weight loss, and night sweats.  -HEENT: Patient denies nonhealing oral sores.  -Skin: As above in HPI. No additional skin concerns.    Physical exam:  Vitals: There were no vitals taken for this visit.  GEN: This is a well developed,  well-nourished male in no acute distress, in a pleasant mood.    SKIN: Full skin, which includes the head/face, both arms, chest, back, abdomen,both legs, genitalia and/or groin buttocks, digits and/or nails, was examined.  -Several scattered light brown dermal junctional nevi throughout  -One 2mm cherry angioma upper back  -1 cm linear crusted healing laceration to right patella region  -  -No other lesions of concern on areas examined.     Impression/Plan:  1. Benign nevi, no concerning areas on FBSE today    Benign nature was discussed.No further intervention required at this time.     ABCDs of melanoma were discussed and self skin checks were advised.     Sun precaution was advised including the use of sun screens of SPF 30 or higher, sun protective clothing, and avoidance of tanning beds.      CC Dr. Gordo Escobedo on close of this encounter.  Follow-up in 2 years, earlier for new or changing lesions.     Staff Involved:  Scribed by Anaid Garner, MS4 for Dr. Ricardo Henderson.  Staff Physician Comments:  I was present with the medical student who participated in the service and in the documentation of the note. I have verified the history and personally performed the physical exam and medical decision making. I agree with the assessment and plan as documented in the note. I have reviewed and if necessary amended the note.      Ricardo Henderson MD  Professor  Head of Dermato-Allergy Division  Department of Dermatology  [facility]

## 2018-07-20 NOTE — MR AVS SNAPSHOT
After Visit Summary   7/20/2018    Kyle Coelho    MRN: [id number]           Patient Information     Date Of Birth          1990        Visit Information        Provider Department      7/20/2018 10:00 AM Ricardo Henderson MD [facility] Dermatology        Care Instructions                      Follow-ups after your visit        Follow-up notes from your care team     Return in about 2 years (around 7/20/2020).      Your next 10 appointments already scheduled     Dec 03, 2018  8:20 AM CST   (Arrive by 8:05 AM)   RETURN INFLAMMATORY BOWEL DISEASE with TRICIA Sheets [facility] ([facility])    [address] [place] [address]  4th [facility] [address] [phone]              Who to contact     Please call your clinic at [phone] to:    Ask questions about your health    Make or cancel appointments    Discuss your medicines    Learn about your test results    Speak to your doctor            Additional Information About Your Visit        MyChart Information     [employer] gives you secure access to your electronic health record. If you see a primary care provider, you can also send messages to your care team and make appointments. If you have questions, please call your primary care clinic.  If you do not have a primary care provider, please call [phone] and they will assist you.      [employer] is an electronic gateway that provides easy, online access to your medical records. With [employer] you can request a clinic appointment, read your test results, renew a prescription or communicate with your care team.     To access your existing account, please contact your [facility] or call [phone] for assistance.        Care EveryWhere ID     This is your Care EveryWhere ID. This could be used by other organizations to access your [place] medical records  [id number]         Blood Pressure from Last  3 Encounters:   06/12/18 124/72   11/21/17 128/78   10/20/17 136/79    Weight from Last 3 Encounters:   06/12/18 92.4 kg (203 lb 12.8 oz)   11/21/17 87.4 kg (192 lb 11.2 oz)   10/20/17 86.9 kg (191 lb 9.6 oz)              Today, you had the following     No orders found for display       Primary Care Provider Office Phone # Fax #    Gabriel Stefano Ibrahim [phone]       [facility] [address] [place] [address]        Equal Access to Services     [facility]: Hadii aad ku hadasho Sobrody, waaxda luqadaha, qaybta kaalmada adereedyada, negar white . So [facility] [phone].    ATENCIÓN: Si habla español, tiene a mabry disposición servicios gratuitos de asistencia lingüística. Llame al [phone].    We comply with applicable federal civil rights laws and [place] laws. We do not discriminate on the basis of race, color, national origin, age, disability, sex, sexual orientation, or gender identity.            Thank you!     Thank you for choosing [facility] DERMATOLOGY  for your care. Our goal is always to provide you with excellent care. Hearing back from our patients is one way we can continue to improve our services. Please take a few minutes to complete the written survey that you may receive in the mail after your visit with us. Thank you!             Your Updated Medication List - Protect others around you: Learn how to safely use, store and throw away your medicines at www.disposemymeds.org.          This list is accurate as of 7/20/18 11:00 AM.  Always use your most recent med list.                   Brand Name Dispense Instructions for use Diagnosis    adalimumab 40 MG/0.8ML pen kit    HUMIRA PEN    1.6 mL    Inject 0.8 mLs (40 mg) Subcutaneous every 14 days    Ulcerative rectosigmoiditis without complication (H)

## 2018-07-21 ASSESSMENT — PATIENT HEALTH QUESTIONNAIRE - PHQ9: SUM OF ALL RESPONSES TO PHQ QUESTIONS 1-9: 1

## 2018-10-24 DIAGNOSIS — K51.30 ULCERATIVE RECTOSIGMOIDITIS WITHOUT COMPLICATION (H): ICD-10-CM

## 2018-11-27 ENCOUNTER — TELEPHONE (OUTPATIENT)
Dept: GASTROENTEROLOGY | Facility: CLINIC | Age: 28
End: 2018-11-27

## 2018-12-03 ENCOUNTER — OFFICE VISIT (OUTPATIENT)
Dept: GASTROENTEROLOGY | Facility: CLINIC | Age: 28
End: 2018-12-03
Payer: COMMERCIAL

## 2018-12-03 VITALS
TEMPERATURE: 98.7 F | OXYGEN SATURATION: 96 % | BODY MASS INDEX: 26.9 KG/M2 | HEART RATE: 51 BPM | HEIGHT: 73 IN | WEIGHT: 203 LBS | DIASTOLIC BLOOD PRESSURE: 82 MMHG | SYSTOLIC BLOOD PRESSURE: 139 MMHG

## 2018-12-03 DIAGNOSIS — Z23 NEED FOR PROPHYLACTIC VACCINATION AND INOCULATION AGAINST INFLUENZA: ICD-10-CM

## 2018-12-03 DIAGNOSIS — K51.00 ULCERATIVE PANCOLITIS WITHOUT COMPLICATION (H): Primary | ICD-10-CM

## 2018-12-03 DIAGNOSIS — K51.00 ULCERATIVE PANCOLITIS WITHOUT COMPLICATION (H): ICD-10-CM

## 2018-12-03 LAB
ALBUMIN SERPL-MCNC: 4.2 G/DL (ref 3.4–5)
ALP SERPL-CCNC: 59 U/L (ref 40–150)
ALT SERPL W P-5'-P-CCNC: 23 U/L (ref 0–70)
AST SERPL W P-5'-P-CCNC: 20 U/L (ref 0–45)
BASOPHILS # BLD AUTO: 0 10E9/L (ref 0–0.2)
BASOPHILS NFR BLD AUTO: 1.1 %
BILIRUB DIRECT SERPL-MCNC: 0.1 MG/DL (ref 0–0.2)
BILIRUB SERPL-MCNC: 0.4 MG/DL (ref 0.2–1.3)
CRP SERPL-MCNC: <2.9 MG/L (ref 0–8)
DIFFERENTIAL METHOD BLD: ABNORMAL
EOSINOPHIL # BLD AUTO: 0.1 10E9/L (ref 0–0.7)
EOSINOPHIL NFR BLD AUTO: 3.1 %
ERYTHROCYTE [DISTWIDTH] IN BLOOD BY AUTOMATED COUNT: 12.3 % (ref 10–15)
ERYTHROCYTE [SEDIMENTATION RATE] IN BLOOD BY WESTERGREN METHOD: 4 MM/H (ref 0–15)
HCT VFR BLD AUTO: 44.7 % (ref 40–53)
HGB BLD-MCNC: 14.6 G/DL (ref 13.3–17.7)
IMM GRANULOCYTES # BLD: 0 10E9/L (ref 0–0.4)
IMM GRANULOCYTES NFR BLD: 0.3 %
LYMPHOCYTES # BLD AUTO: 1.2 10E9/L (ref 0.8–5.3)
LYMPHOCYTES NFR BLD AUTO: 34.5 %
MCH RBC QN AUTO: 28.9 PG (ref 26.5–33)
MCHC RBC AUTO-ENTMCNC: 32.7 G/DL (ref 31.5–36.5)
MCV RBC AUTO: 88 FL (ref 78–100)
MONOCYTES # BLD AUTO: 0.6 10E9/L (ref 0–1.3)
MONOCYTES NFR BLD AUTO: 15.7 %
NEUTROPHILS # BLD AUTO: 1.6 10E9/L (ref 1.6–8.3)
NEUTROPHILS NFR BLD AUTO: 45.3 %
NRBC # BLD AUTO: 0 10*3/UL
NRBC BLD AUTO-RTO: 0 /100
PLATELET # BLD AUTO: 171 10E9/L (ref 150–450)
PROT SERPL-MCNC: 8 G/DL (ref 6.8–8.8)
RBC # BLD AUTO: 5.06 10E12/L (ref 4.4–5.9)
WBC # BLD AUTO: 3.6 10E9/L (ref 4–11)

## 2018-12-03 ASSESSMENT — ENCOUNTER SYMPTOMS
HOARSE VOICE: 0
DISTURBANCES IN COORDINATION: 0
PARALYSIS: 0
SORE THROAT: 1
SMELL DISTURBANCE: 0
LOSS OF CONSCIOUSNESS: 0
NUMBNESS: 0
DIZZINESS: 0
TINGLING: 0
WEAKNESS: 0
SEIZURES: 0
HEADACHES: 1
TASTE DISTURBANCE: 0
MEMORY LOSS: 0
NECK MASS: 0
SINUS CONGESTION: 0
SPEECH CHANGE: 0
TREMORS: 0
SINUS PAIN: 0
TROUBLE SWALLOWING: 0

## 2018-12-03 ASSESSMENT — PAIN SCALES - GENERAL: PAINLEVEL: NO PAIN (0)

## 2018-12-03 NOTE — PROGRESS NOTES
IBD CLINIC VISIT     CC/REFERRING MD:  Self referred  REASON FOR FOLLOW UP: Ulcerative colitis    IBD HISTORY  Age at diagnosis: 26, 2017  Extent of disease: Pancolitis  Current UC medications: Adalimumab (started Nov, 2017) every 14 days  Prior UC surgeries: None  Prior IBD Medications:   Lialda - flu like illness with fevers, abdominal pains  Apriso - flu like illlness  Sulfasalazine - did not control symptoms    DRUG MONITORING  TPMT enzyme activity: --    6-TGN/6-MMPN levels: --    Biologic concentration:  ADA level 12.4, no antibodies (every 14 days, monotherapy)    DISEASE ASSESSMENT  Labs:  CRP, ESR Results  Recent Labs   Lab Test  06/12/18   0946  02/05/18   1053   CRP  <2.9  <2.9   SED  3  3     Lab Results   Component Value Date    ALBUMIN 3.7 10/20/2017     Endoscopic assessment: Flexible sigmoidoscopy 2/5/2018 shows Maddox score 0, normal or inactive disease.  Enterography: --  Fecal calprotectin: --   C diff: Negative 10/2017  pMayo score: 0/9    ASSESSMENT/PLAN  This is a 28-year-old male with a history of ulcerative pancolitis who was recently started on adalimumab therapy.      1.  Ulcerative pancolitis: He is now in steroid free clinical and endoscopic remission on adalimumab monotherapy.  Is doing extremely well and we should work to maintain his response.  Discussed the need for compliance to medications. Adalimumab is within range with level 12.4, on monotherapy every 14 days.  -- Continue adalimumab every 14 days  -- Blood work every 3-4 months on adalimumab: CBC, LFTs, CRP, ESR  -- Due for TB quant which we will obtain today    2.  Joint pain: substantially improved and likely related to his underlying IBD.  Ongoing joint arthralgias potentially related to physical activity.  Will continue to observe for now.      IBD healthcare maintenance based on patients current medication:  Anti-TNF medication therapy vs Anti-integrin therapy      Vaccinations:  -- Influenza (every year): today  -- TdaP  (every 10 years): 2015  -- Pneumococcal Pneumonia (once then every 5 years): recommend  -- Yearly assessment for latent Tb (verbal screening and exam, PPD or QuantiFERON-Tb testing): Normal chest xray 3/12/17    One time confirmation of immunity or serologies:  -- Hepatitis A (serologies or immunizations): Hep A Ab, Total was nonreactive ([facility])  -- Hepatitis B (serologies or immunizations): immune by serologies ([facility])  -- Varicella: Had chickenpox as a child  -- MMR: Not documented  -- HPV (all aged 18-26): Not documented  -- Meningococcal meningitis (all patients at risk for meningitis): 2005   -- Due to the immunosuppression in this patient, I would not advise administration of live vaccines such as varicella/VZV, intranasal influenza, MMR, or yellow fever vaccine (if travelling).      Bone mineral density screening   -- Recommend all patients supplement with calcium and vitamin D  -- Given prior steroid use recommend DEXA if not already done    Cancer Screening:  Colon cancer screening:  Given pancolitis colonoscopy every 1-3 years recommended after 8 years of disease.  Dysplasia screening is recommended 2024.    Cervical cancer screening: N/A    Skin cancer screening: Annual visual exam of skin by dermatologist since patient is immunocompromised    Depression Screening:  -- Over the last month, have you felt down, depressed, or hopeless? No  -- Over the last month, have you felt little interest or pleasure doing things? No    Misc:  -- Avoid tobacco use  -- Avoid NSAIDs as there is potentially a 25% chance of causing an IBD flare    RTC 6 months    Thank you for this consultation.  It was a pleasure to participate in the care of this patient; please contact us with any further questions.      Miguel Turner PA-C  Division of Gastroenterology, Hepatology and Nutrition  [facility]     HPI:   This is a 28-year-old male with a history of ulcerative colitis who is here today for follow-up.  He is doing  extremely well on adalimumab monotherapy.  He was able to taper off Uceris in the October of 2017.  Flexible sigmoidoscopy in the Feb of 2018 demonstrated no active colitis.  Clinically he is having 1-2 formed stool a day with no blood and no urgency.  He has no nausea vomiting abdominal pain.  He has no dietary restrictions.  He has moved to [place] and is now living in [place].  He does occasionally have some arthralgias in his knees and fingers which he attributes this to occasionally working at home or working out.  He is in no evidence of arthritis.    ROS:    No fevers or chills  No weight loss  No blurry vision, double vision or change in vision  + sore throat  No lymphadenopathy  + headache (when first started ADA would get random headaches once per month and last 24 hours, lately has improved with less frequency)  No paraesthesias, or weakness in a limb  No shortness of breath or wheezing  No chest pain or pressure  No arthralgias or myalgias  No rashes or skin changes  No odynophagia or dysphagia  No BRBPR, hematochezia, melena  No dysuria, frequency or urgency  No hot/cold intolerance or polyria  No anxiety or depression    Extra intestinal manifestations of IBD:  No uveitis/episcleritis  No aphthous ulcers   No arthritis   No erythema nodosum/pyoderma gangrenosum.     PERTINENT PAST MEDICAL HISTORY:  See HPI    PREVIOUS SURGERIES:  None    PREVIOUS ENDOSCOPIES:   Colonoscopy on 02/16/2017 revealed continuous erythema throughout the entire colon and patchy inflammation within the ileum.  Biopsies in the duodenum did not show any evidence of celiac.  Biopsies from the terminal ileum showed nonspecific acute ileitis presumed to be backwash ileitis.  Random colon biopsies showed moderate active chronic colitis consistent with inflammatory bowel disease.       ALLERGIES:   No Known Allergies    PERTINENT MEDICATIONS:    Current Outpatient Prescriptions:      adalimumab (HUMIRA PEN) 40 MG/0.8ML pen kit,  "Inject 0.8 mLs (40 mg) Subcutaneous every 14 days, Disp: 1.6 mL, Rfl: 2    SOCIAL HISTORY:  Social History     Social History     Marital status: Single     Spouse name: N/A     Number of children: N/A     Years of education: N/A     Occupational History     Not on file.     Social History Main Topics     Smoking status: Never Smoker     Smokeless tobacco: Never Used     Alcohol use No     Drug use: No     Sexual activity: No     Other Topics Concern     Not on file     Social History Narrative     FAMILY HISTORY:  No CRC, no IBD  Past/family/social history reviewed and no changes    PHYSICAL EXAMINATION:  Constitutional: aaox3, cooperative, pleasant, not dyspneic/diaphoretic, no acute distress  Vitals reviewed: /82  Pulse 51  Temp 98.7  F (37.1  C) (Oral)  Ht 1.854 m (6' 1\")  Wt 92.1 kg (203 lb)  SpO2 96%  BMI 26.78 kg/m2  Wt:   Wt Readings from Last 2 Encounters:   12/03/18 92.1 kg (203 lb)   06/12/18 92.4 kg (203 lb 12.8 oz)      Eyes: Sclera anicteric/injected  Ears/nose/mouth/throat: Normal oropharynx without ulcers or exudate, mucus membranes moist, hearing intact  Neck: supple, thyroid normal size  CV: No edema  Respiratory: Unlabored breathing  Lymph: No axillary, submandibular, supraclavicular or inguinal lymphadenopathy  Abd: Nondistended, +bs, no hepatosplenomegaly, nontender, no peritoneal signs  Skin: warm, perfused, no jaundice  Psych: Normal affect  MSK: Normal gait      PERTINENT STUDIES:  Most recent CBC:  Recent Labs   Lab Test  06/12/18   0946  02/05/18   1053   WBC  7.2  4.4   HGB  14.6  15.2   HCT  42.0  44.2   PLT  208  188     Most recent hepatic panel:  Recent Labs   Lab Test  06/12/18   0946  02/05/18   1053   ALT  33  61   AST  91*  52*     Most recent creatinine:  Recent Labs   Lab Test  03/12/17   1418   CR  0.99       Answers for HPI/ROS submitted by the patient on 12/3/2018   General Symptoms: No  Skin Symptoms: No  HENT Symptoms: Yes  EYE SYMPTOMS: No  HEART SYMPTOMS: " No  LUNG SYMPTOMS: No  INTESTINAL SYMPTOMS: No  URINARY SYMPTOMS: No  REPRODUCTIVE SYMPTOMS: No  SKELETAL SYMPTOMS: No  BLOOD SYMPTOMS: No  NERVOUS SYSTEM SYMPTOMS: Yes  MENTAL HEALTH SYMPTOMS: No  Ear pain: No  Ear discharge: No  Hearing loss: No  Tinnitus: No  Nosebleeds: No  Congestion: No  Sinus pain: No  Trouble swallowing: No   Voice hoarseness: No  Mouth sores: No  Sore throat: Yes  Tooth pain: No  Gum tenderness: No  Bleeding gums: No  Change in taste: No  Change in sense of smell: No  Dry mouth: No  Hearing aid used: No  Neck lump: No  Trouble with coordination: No  Dizziness or trouble with balance: No  Fainting or black-out spells: No  Memory loss: No  Headache: Yes  Seizures: No  Speech problems: No  Tingling: No  Tremor: No  Weakness: No  Difficulty walking: No  Paralysis: No  Numbness: No

## 2018-12-03 NOTE — PATIENT INSTRUCTIONS
It was a pleasure taking care of you today.  I've included a brief summary of our discussion and care plan from today's visit below.  Please review this information with your primary care provider.  ______________________________________________________________________    My recommendations are summarized as follows:    -- Continue humira every other week  -- Labs today  -- Next endoscopic assessment: colonoscopy 2024  -- Patient with IBD we recommend supplementation vitamin D 1000 units daily and calcium 500 mg twice daily.  -- Vaccines/immunizations to be updated: flu shot today  -- Yearly Dermatology visit for skin check while on immunosuppressive therapy. Can call [phone] to schedule.  -- No NSAIDs (ibuprofen, or anything containing ibuprofen)   -- Dietitian referral    For additional resources about inflammatory bowel disease visit [email or web address]      Return to GI Clinic in 6 months to review your progress.    ______________________________________________________________________    Who do I call with any questions after my visit?  Please be in touch if there are any further questions that arise following today's visit.  There are multiple ways to contact your gastroenterology care team.        During business hours, you may reach a Gastroenterology nurse at [phone], option 3.       To schedule or reschedule an appointment, please call [phone].       You can always send a secure message through [employer].  [employer] messages are answered by your nurse or doctor typically within 24 hours.  Please allow extra time on weekends and holidays.        For urgent/emergent questions after business hours, you may reach the on-call GI Fellow by contacting the [facility] at [phone].      In order for your refill to be processed in a timely fashion, it is your responsibility to ensure you follow the recommendations from your provider regarding your laboratory  studies and follow up appointments.       How will I get the results of any tests ordered?    You will receive all of your results.  If you have signed up for [employer], any tests ordered at your visit will be available to you after your physician reviews them.  Typically this takes 1-2 weeks.  If there are urgent results that require a change in your care plan, your physician or nurse will call you to discuss the next steps.      What is [employer]?  [facility] [employer] is a secure way for you to access all of your healthcare records from the [facility].  It is a web based computer program, so you can sign on to it from any location.  It also allows you to send secure messages to your care team.  I recommend signing up for [facility] access if you have not already done so and are comfortable with using a computer.      How to I schedule a follow-up visit?  If you did not schedule a follow-up visit today, please call [phone] to schedule a follow-up office visit.        Sincerely,    Miguel Turner PA-C  [facility]  Division of Gastroenterology

## 2018-12-03 NOTE — LETTER
12/3/2018       RE: Kyle Coelho  [address]  [facility] [address]     Dear Colleague,    Thank you for referring your patient, Kyle Coelho, to the [facility] GASTROENTER[facility] AND IBD CLINIC at [facility]. Please see a copy of my visit note below.    IBD CLINIC VISIT     CC/REFERRING MD:  Self referred  REASON FOR FOLLOW UP: Ulcerative colitis    IBD HISTORY  Age at diagnosis: 26, 2017  Extent of disease: Pancolitis  Current UC medications: Adalimumab (started Nov, 2017) every 14 days  Prior UC surgeries: None  Prior IBD Medications:   Lialda - flu like illness with fevers, abdominal pains  Apriso - flu like illlness  Sulfasalazine - did not control symptoms    DRUG MONITORING  TPMT enzyme activity: --    6-TGN/6-MMPN levels: --    Biologic concentration:  ADA level 12.4, no antibodies (every 14 days, monotherapy)    DISEASE ASSESSMENT  Labs:  CRP, ESR Results  Recent Labs   Lab Test  06/12/18   0946  02/05/18   1053   CRP  <2.9  <2.9   SED  3  3     Lab Results   Component Value Date    ALBUMIN 3.7 10/20/2017     Endoscopic assessment: Flexible sigmoidoscopy 2/5/2018 shows Maddox score 0, normal or inactive disease.  Enterography: --  Fecal calprotectin: --   C diff: Negative 10/2017  pMayo score: 0/9    ASSESSMENT/PLAN  This is a 28-year-old male with a history of ulcerative pancolitis who was recently started on adalimumab therapy.      1.  Ulcerative pancolitis: He is now in steroid free clinical and endoscopic remission on adalimumab monotherapy.  Is doing extremely well and we should work to maintain his response.  Discussed the need for compliance to medications. Adalimumab is within range with level 12.4, on monotherapy every 14 days.  -- Continue adalimumab every 14 days  -- Blood work every 3-4 months on adalimumab: CBC, LFTs, CRP, ESR  -- Due for TB quant which we will obtain today    2.  Joint pain: substantially improved and likely related to his  underlying IBD.  Ongoing joint arthralgias potentially related to physical activity.  Will continue to observe for now.      IBD healthcare maintenance based on patients current medication:  Anti-TNF medication therapy vs Anti-integrin therapy      Vaccinations:  -- Influenza (every year): today  -- TdaP (every 10 years): 2015  -- Pneumococcal Pneumonia (once then every 5 years): recommend  -- Yearly assessment for latent Tb (verbal screening and exam, PPD or QuantiFERON-Tb testing): Normal chest xray 3/12/17    One time confirmation of immunity or serologies:  -- Hepatitis A (serologies or immunizations): Hep A Ab, Total was nonreactive ([facility])  -- Hepatitis B (serologies or immunizations): immune by serologies ([facility])  -- Varicella: Had chickenpox as a child  -- MMR: Not documented  -- HPV (all aged 18-26): Not documented  -- Meningococcal meningitis (all patients at risk for meningitis): 2005   -- Due to the immunosuppression in this patient, I would not advise administration of live vaccines such as varicella/VZV, intranasal influenza, MMR, or yellow fever vaccine (if travelling).      Bone mineral density screening   -- Recommend all patients supplement with calcium and vitamin D  -- Given prior steroid use recommend DEXA if not already done    Cancer Screening:  Colon cancer screening:  Given pancolitis colonoscopy every 1-3 years recommended after 8 years of disease.  Dysplasia screening is recommended 2024.    Cervical cancer screening: N/A    Skin cancer screening: Annual visual exam of skin by dermatologist since patient is immunocompromised    Depression Screening:  -- Over the last month, have you felt down, depressed, or hopeless? No  -- Over the last month, have you felt little interest or pleasure doing things? No    Misc:  -- Avoid tobacco use  -- Avoid NSAIDs as there is potentially a 25% chance of causing an IBD flare    RTC 6 months    Thank you for this consultation.  It was a pleasure to  participate in the care of this patient; please contact us with any further questions.      Miguel Turner PA-C  Division of Gastroenterology, Hepatology and Nutrition  [facility]     HPI:   This is a 28-year-old male with a history of ulcerative colitis who is here today for follow-up.  He is doing extremely well on adalimumab monotherapy.  He was able to taper off Uceris in the October of 2017.  Flexible sigmoidoscopy in the Feb of 2018 demonstrated no active colitis.  Clinically he is having 1-2 formed stool a day with no blood and no urgency.  He has no nausea vomiting abdominal pain.  He has no dietary restrictions.  He has moved to [place] and is now living in [place].  He does occasionally have some arthralgias in his knees and fingers which he attributes this to occasionally working at home or working out.  He is in no evidence of arthritis.    ROS:    No fevers or chills  No weight loss  No blurry vision, double vision or change in vision  + sore throat  No lymphadenopathy  + headache (when first started ADA would get random headaches once per month and last 24 hours, lately has improved with less frequency)  No paraesthesias, or weakness in a limb  No shortness of breath or wheezing  No chest pain or pressure  No arthralgias or myalgias  No rashes or skin changes  No odynophagia or dysphagia  No BRBPR, hematochezia, melena  No dysuria, frequency or urgency  No hot/cold intolerance or polyria  No anxiety or depression    Extra intestinal manifestations of IBD:  No uveitis/episcleritis  No aphthous ulcers   No arthritis   No erythema nodosum/pyoderma gangrenosum.     PERTINENT PAST MEDICAL HISTORY:  See HPI    PREVIOUS SURGERIES:  None    PREVIOUS ENDOSCOPIES:   Colonoscopy on 02/16/2017 revealed continuous erythema throughout the entire colon and patchy inflammation within the ileum.  Biopsies in the duodenum did not show any evidence of celiac.  Biopsies from the terminal ileum showed  "nonspecific acute ileitis presumed to be backwash ileitis.  Random colon biopsies showed moderate active chronic colitis consistent with inflammatory bowel disease.       ALLERGIES:   No Known Allergies    PERTINENT MEDICATIONS:    Current Outpatient Prescriptions:      adalimumab (HUMIRA PEN) 40 MG/0.8ML pen kit, Inject 0.8 mLs (40 mg) Subcutaneous every 14 days, Disp: 1.6 mL, Rfl: 2    SOCIAL HISTORY:  Social History     Social History     Marital status: Single     Spouse name: N/A     Number of children: N/A     Years of education: N/A     Occupational History     Not on file.     Social History Main Topics     Smoking status: Never Smoker     Smokeless tobacco: Never Used     Alcohol use No     Drug use: No     Sexual activity: No     Other Topics Concern     Not on file     Social History Narrative     FAMILY HISTORY:  No CRC, no IBD  Past/family/social history reviewed and no changes    PHYSICAL EXAMINATION:  Constitutional: aaox3, cooperative, pleasant, not dyspneic/diaphoretic, no acute distress  Vitals reviewed: /82  Pulse 51  Temp 98.7  F (37.1  C) (Oral)  Ht 1.854 m (6' 1\")  Wt 92.1 kg (203 lb)  SpO2 96%  BMI 26.78 kg/m2  Wt:   Wt Readings from Last 2 Encounters:   12/03/18 92.1 kg (203 lb)   06/12/18 92.4 kg (203 lb 12.8 oz)      Eyes: Sclera anicteric/injected  Ears/nose/mouth/throat: Normal oropharynx without ulcers or exudate, mucus membranes moist, hearing intact  Neck: supple, thyroid normal size  CV: No edema  Respiratory: Unlabored breathing  Lymph: No axillary, submandibular, supraclavicular or inguinal lymphadenopathy  Abd: Nondistended, +bs, no hepatosplenomegaly, nontender, no peritoneal signs  Skin: warm, perfused, no jaundice  Psych: Normal affect  MSK: Normal gait      PERTINENT STUDIES:  Most recent CBC:  Recent Labs   Lab Test  06/12/18   0946  02/05/18   1053   WBC  7.2  4.4   HGB  14.6  15.2   HCT  42.0  44.2   PLT  208  188     Most recent hepatic panel:  Recent Labs   Lab " "Test  06/12/18   0946  02/05/18   1053   ALT  33  61   AST  91*  52*     Most recent creatinine:  Recent Labs   Lab Test  03/12/17   1418   CR  0.99       Answers for HPI/ROS submitted by the patient on 12/3/2018   General Symptoms: No  Skin Symptoms: No  HENT Symptoms: Yes  EYE SYMPTOMS: No  HEART SYMPTOMS: No  LUNG SYMPTOMS: No  INTESTINAL SYMPTOMS: No  URINARY SYMPTOMS: No  REPRODUCTIVE SYMPTOMS: No  SKELETAL SYMPTOMS: No  BLOOD SYMPTOMS: No  NERVOUS SYSTEM SYMPTOMS: Yes  MENTAL HEALTH SYMPTOMS: No  Ear pain: No  Ear discharge: No  Hearing loss: No  Tinnitus: No  Nosebleeds: No  Congestion: No  Sinus pain: No  Trouble swallowing: No   Voice hoarseness: No  Mouth sores: No  Sore throat: Yes  Tooth pain: No  Gum tenderness: No  Bleeding gums: No  Change in taste: No  Change in sense of smell: No  Dry mouth: No  Hearing aid used: No  Neck lump: No  Trouble with coordination: No  Dizziness or trouble with balance: No  Fainting or black-out spells: No  Memory loss: No  Headache: Yes  Seizures: No  Speech problems: No  Tingling: No  Tremor: No  Weakness: No  Difficulty walking: No  Paralysis: No  Numbness: No        Injectable Influenza Immunization Documentation    1.  Has the patient received the information for the injectable influenza vaccine? YES     2. Is the patient 6 months of age or older? YES     3. Does the patient have any of the following contraindications?      Severe allergy to eggs? No  Egg Allergy Algorithm Link    Severe allergic reaction to previous influenza vaccines? No    Severe allergy to latex? No    History of Guillain-Sparks syndrome? No  Currently have a temperature greater than 100.4F? No      4. Severely egg allergic patients should have flu vaccine eligibility assessed by an MD, RN, or pharmacist, and those who received flu vaccine should be observed for 15 min by an MD, RN, Pharmacist, Medical Technician, or member of clinic staff.\": YES    5. Latex-allergic patients should be given " latex-free influenza vaccine Yes. Please reference the Vaccine latex table to determine if your clinic's product is latex-containing.       Vaccination given by TANIKA Lunsford      Again, thank you for allowing me to participate in the care of your patient.      Sincerely,    Miguel Turner PA-C

## 2018-12-03 NOTE — PROGRESS NOTES
"  Injectable Influenza Immunization Documentation    1.  Has the patient received the information for the injectable influenza vaccine? YES     2. Is the patient 6 months of age or older? YES     3. Does the patient have any of the following contraindications?      Severe allergy to eggs? No  Egg Allergy Algorithm Link    Severe allergic reaction to previous influenza vaccines? No    Severe allergy to latex? No    History of Guillain-[place] syndrome? No  Currently have a temperature greater than 100.4F? No      4. Severely egg allergic patients should have flu vaccine eligibility assessed by an MD, RN, or pharmacist, and those who received flu vaccine should be observed for 15 min by an MD, RN, Pharmacist, Medical Technician, or member of clinic staff.\": YES    5. Latex-allergic patients should be given latex-free influenza vaccine Yes. Please reference the Vaccine latex table to determine if your clinic's product is latex-containing.       Vaccination given by TANIKA Lunsford    "

## 2018-12-03 NOTE — MR AVS SNAPSHOT
After Visit Summary   12/3/2018    Kyle Coelho    MRN: [id number]           Patient Information     Date Of Birth          1990        Visit Information        Provider Department      12/3/2018 8:20 AM Miguel Turner PA-C [facility] Gastroenterology and IBD Clinic        Today's Diagnoses     Ulcerative pancolitis without complication (H)    -  1      Care Instructions    It was a pleasure taking care of you today.  I've included a brief summary of our discussion and care plan from today's visit below.  Please review this information with your primary care provider.  ______________________________________________________________________    My recommendations are summarized as follows:    -- Continue humira every other week  -- Labs today  -- Next endoscopic assessment: colonoscopy 2024  -- Patient with IBD we recommend supplementation vitamin D 1000 units daily and calcium 500 mg twice daily.  -- Vaccines/immunizations to be updated: flu shot today  -- Yearly Dermatology visit for skin check while on immunosuppressive therapy. Can call [phone] to schedule.  -- No NSAIDs (ibuprofen, or anything containing ibuprofen)   -- Dietitian referral    For additional resources about inflammatory bowel disease visit [email or web address]      Return to GI Clinic in 6 months to review your progress.    ______________________________________________________________________    Who do I call with any questions after my visit?  Please be in touch if there are any further questions that arise following today's visit.  There are multiple ways to contact your gastroenterology care team.        During business hours, you may reach a Gastroenterology nurse at [phone], option 3.       To schedule or reschedule an appointment, please call [phone].       You can always send a secure message through [employer].  [employer] messages are answered by your nurse or doctor typically within 24  hours.  Please allow extra time on weekends and holidays.        For urgent/emergent questions after business hours, you may reach the on-call GI Fellow by contacting the [facility]  at [phone].      In order for your refill to be processed in a timely fashion, it is your responsibility to ensure you follow the recommendations from your provider regarding your laboratory studies and follow up appointments.       How will I get the results of any tests ordered?    You will receive all of your results.  If you have signed up for [employer], any tests ordered at your visit will be available to you after your physician reviews them.  Typically this takes 1-2 weeks.  If there are urgent results that require a change in your care plan, your physician or nurse will call you to discuss the next steps.      What is "[employer]"?  "[employer]" is a secure way for you to access all of your healthcare records from the [facility].  It is a web based computer program, so you can sign on to it from any location.  It also allows you to send secure messages to your care team.  I recommend signing up for "[employer]" access if you have not already done so and are comfortable with using a computer.      How to I schedule a follow-up visit?  If you did not schedule a follow-up visit today, please call [phone] to schedule a follow-up office visit.        Sincerely,    Miguel Turner PA-C  [facility]  Division of Gastroenterology                Follow-ups after your visit        Follow-up notes from your care team     Return in about 6 months (around 6/3/2019).      Future tests that were ordered for you today     Open Future Orders        Priority Expected Expires Ordered    Tuberculosis by Quantiferon (gold) [[id number]] Routine 12/3/2018 2/1/2019 12/3/2018            Who to contact     Please call your clinic at [phone] to:    Ask questions about your health    Make or cancel  "appointments    Discuss your medicines    Learn about your test results    Speak to your doctor            Additional Information About Your Visit        [employer] Information     [employer] gives you secure access to your electronic health record. If you see a primary care provider, you can also send messages to your care team and make appointments. If you have questions, please call your primary care clinic.  If you do not have a primary care provider, please call [phone] and they will assist you.      [employer] is an electronic gateway that provides easy, online access to your medical records. With [employer], you can request a clinic appointment, read your test results, renew a prescription or communicate with your care team.     To access your existing account, please contact your [facility] or call [phone] for assistance.        Care EveryWhere ID     This is your Care EveryWhere ID. This could be used by other organizations to access your [place] medical records  [id number]        Your Vitals Were     Pulse Temperature Height Pulse Oximetry BMI (Body Mass Index)       51 98.7  F (37.1  C) (Oral) 1.854 m (6' 1\") 96% 26.78 kg/m2        Blood Pressure from Last 3 Encounters:   12/03/18 139/82   06/12/18 124/72   11/21/17 128/78    Weight from Last 3 Encounters:   12/03/18 92.1 kg (203 lb)   06/12/18 92.4 kg (203 lb 12.8 oz)   11/21/17 87.4 kg (192 lb 11.2 oz)               Primary Care Provider Office Phone # Fax #    Gabriel Stefano Ibrahim [phone]       [facility] [address] [place] [address]        Equal Access to Services     [facility] AH: Hadii aad chris hadashemily Sobrody, waaxda luqadaha, qaybta kaalmada negar starkey. So [facility] [phone].    ATENCIÓN: Si habla español, tiene a mabry disposición servicios gratuitos de asistencia lingüística. Llame al [phone].    We comply with applicable " federal civil rights laws and [place] laws. We do not discriminate on the basis of race, color, national origin, age, disability, sex, sexual orientation, or gender identity.            Thank you!     Thank you for choosing [facility] GASTROENTEROLOGY AND IBD CLINIC  for your care. Our goal is always to provide you with excellent care. Hearing back from our patients is one way we can continue to improve our services. Please take a few minutes to complete the written survey that you may receive in the mail after your visit with us. Thank you!             Your Updated Medication List - Protect others around you: Learn how to safely use, store and throw away your medicines at www.disposemymeds.org.          This list is accurate as of 12/3/18  8:47 AM.  Always use your most recent med list.                   Brand Name Dispense Instructions for use Diagnosis    adalimumab 40 MG/0.8ML pen kit    HUMIRA PEN    1.6 mL    Inject 0.8 mLs (40 mg) Subcutaneous every 14 days    Ulcerative rectosigmoiditis without complication (H)

## 2018-12-03 NOTE — NURSING NOTE
"Chief Complaint   Patient presents with     RECHECK     Return IBD       Vitals:    12/03/18 0752   BP: 139/82   Pulse: 51   Temp: 98.7  F (37.1  C)   TempSrc: Oral   SpO2: 96%   Weight: 92.1 kg (203 lb)   Height: 1.854 m (6' 1\")       Body mass index is 26.78 kg/(m^2).  TANIKA Lunsford                       "

## 2018-12-04 LAB
GAMMA INTERFERON BACKGROUND BLD IA-ACNC: 0.14 IU/ML
M TB IFN-G BLD-IMP: NEGATIVE
M TB IFN-G CD4+ BCKGRND COR BLD-ACNC: >10 IU/ML
MITOGEN IGNF BCKGRD COR BLD-ACNC: 0 IU/ML
MITOGEN IGNF BCKGRD COR BLD-ACNC: 0 IU/ML

## 2018-12-05 DIAGNOSIS — K51.00 ULCERATIVE PANCOLITIS WITHOUT COMPLICATION (H): Primary | ICD-10-CM

## 2018-12-05 NOTE — PROGRESS NOTES
WBC slightly low at 3.6.  Plan to repeat labs in 2 weeks.      Miguel Turner PA-C  Division of Gastroenterology, Hepatology and Nutrition  [facility]

## 2018-12-31 DIAGNOSIS — K51.00 ULCERATIVE PANCOLITIS WITHOUT COMPLICATION (H): ICD-10-CM

## 2018-12-31 LAB
BASOPHILS # BLD AUTO: 0 10E9/L (ref 0–0.2)
BASOPHILS NFR BLD AUTO: 0.5 %
DIFFERENTIAL METHOD BLD: NORMAL
EOSINOPHIL # BLD AUTO: 0.3 10E9/L (ref 0–0.7)
EOSINOPHIL NFR BLD AUTO: 4.7 %
ERYTHROCYTE [DISTWIDTH] IN BLOOD BY AUTOMATED COUNT: 12.1 % (ref 10–15)
HCT VFR BLD AUTO: 44.5 % (ref 40–53)
HGB BLD-MCNC: 15 G/DL (ref 13.3–17.7)
IMM GRANULOCYTES # BLD: 0 10E9/L (ref 0–0.4)
IMM GRANULOCYTES NFR BLD: 0.2 %
LYMPHOCYTES # BLD AUTO: 2.3 10E9/L (ref 0.8–5.3)
LYMPHOCYTES NFR BLD AUTO: 39.1 %
MCH RBC QN AUTO: 29.2 PG (ref 26.5–33)
MCHC RBC AUTO-ENTMCNC: 33.7 G/DL (ref 31.5–36.5)
MCV RBC AUTO: 87 FL (ref 78–100)
MONOCYTES # BLD AUTO: 0.4 10E9/L (ref 0–1.3)
MONOCYTES NFR BLD AUTO: 7.4 %
NEUTROPHILS # BLD AUTO: 2.9 10E9/L (ref 1.6–8.3)
NEUTROPHILS NFR BLD AUTO: 48.1 %
PLATELET # BLD AUTO: 190 10E9/L (ref 150–450)
RBC # BLD AUTO: 5.13 10E12/L (ref 4.4–5.9)
WBC # BLD AUTO: 5.9 10E9/L (ref 4–11)

## 2018-12-31 PROCEDURE — 36415 COLL VENOUS BLD VENIPUNCTURE: CPT | Performed by: PHYSICIAN ASSISTANT

## 2018-12-31 PROCEDURE — 85025 COMPLETE CBC W/AUTO DIFF WBC: CPT | Performed by: PHYSICIAN ASSISTANT

## 2019-01-02 ENCOUNTER — PATIENT OUTREACH (OUTPATIENT)
Dept: GASTROENTEROLOGY | Facility: CLINIC | Age: 29
End: 2019-01-02

## 2019-01-02 NOTE — PROGRESS NOTES
Called pt in response to a my chart. Discussed with pt after seeing the my chart message and photo.  Pt will inject in other leg and call with a misfire to ambassador program.  Pt has another pen to inject.

## 2019-01-14 ENCOUNTER — TELEPHONE (OUTPATIENT)
Dept: GASTROENTEROLOGY | Facility: CLINIC | Age: 29
End: 2019-01-14

## 2019-01-14 NOTE — TELEPHONE ENCOUNTER
Called  with one time refill for humira pen as pt has a misfire. Called and Telephone order giving to pharmacist Kiko.  Pt should have call either today or tomorrow to arrange delivery.

## 2019-01-14 NOTE — TELEPHONE ENCOUNTER
[facility] Call Center    Phone Message    May a detailed message be left on voicemail: yes    Reason for Call: Other: [facility], the pharmacy of the Humira [facility]-is calling to get a verbal RX for a 1 time replacement pen of the Humira due to a misfire, at no cost. Please call [facility] at [phone] and ask to be transferred to a pharmacist. Thanks.     Action Taken: Message routed to:  [facility]

## 2019-05-03 ENCOUNTER — TELEPHONE (OUTPATIENT)
Dept: GASTROENTEROLOGY | Facility: CLINIC | Age: 29
End: 2019-05-03

## 2019-05-06 ASSESSMENT — ENCOUNTER SYMPTOMS
DIFFICULTY URINATING: 1
HEMATURIA: 0
FLANK PAIN: 0
DYSURIA: 0

## 2019-05-07 ENCOUNTER — OFFICE VISIT (OUTPATIENT)
Dept: GASTROENTEROLOGY | Facility: CLINIC | Age: 29
End: 2019-05-07
Payer: COMMERCIAL

## 2019-05-07 VITALS
DIASTOLIC BLOOD PRESSURE: 80 MMHG | HEIGHT: 73 IN | BODY MASS INDEX: 28.49 KG/M2 | WEIGHT: 215 LBS | SYSTOLIC BLOOD PRESSURE: 141 MMHG | HEART RATE: 57 BPM | OXYGEN SATURATION: 100 %

## 2019-05-07 DIAGNOSIS — K51.00 ULCERATIVE PANCOLITIS WITHOUT COMPLICATION (H): Primary | ICD-10-CM

## 2019-05-07 ASSESSMENT — MIFFLIN-ST. JEOR: SCORE: 2004.11

## 2019-05-07 ASSESSMENT — PAIN SCALES - GENERAL: PAINLEVEL: NO PAIN (0)

## 2019-05-07 NOTE — NURSING NOTE
"Chief Complaint   Patient presents with     RECHECK     Return IBD, follow up ulcerative pancolitis       Vitals:    05/07/19 0835   BP: 141/80   Pulse: 57   SpO2: 100%   Weight: 97.5 kg (215 lb)   Height: 1.854 m (6' 1\")       Body mass index is 28.37 kg/m .    Angela Weaver CMA    "

## 2019-05-07 NOTE — LETTER
5/7/2019       RE: Kyle Coelho  [address]  [facility] [address]     Dear Colleague,    Thank you for referring your patient, Kyle Coelho, to the [facility] GASTROENTEROLOGY AND IBD CLINIC at [facility]. Please see a copy of my visit note below.    IBD CLINIC VISIT     CC/REFERRING MD:  Self referred  REASON FOR FOLLOW UP: Ulcerative colitis    ASSESSMENT/PLAN  This is a 29 year old male with a history of ulcerative pancolitis who was recently started on adalimumab therapy.      1.  Ulcerative pancolitis: Continues in steroid free clinical and endoscopic remission on adalimumab monotherapy.  Is doing extremely well and we should work to maintain his response. Adalimumab is within range with level 12.4, on monotherapy every 14 days.  -- Continue adalimumab every 14 days  -- Blood work every 3-4 months on adalimumab: CBC, LFTs, CRP, ESR    2.  Joint pain: substantially improved and likely related to his underlying IBD.      IBD HISTORY  Age at diagnosis: 26, 2017  Extent of disease: Pancolitis  Current UC medications:   -- Adalimumab (started Nov, 2017) every 14 days  Prior UC surgeries: None  Prior IBD Medications:   -- Lialda - flu like illness with fevers, abdominal pains  -- Apriso - flu like illlness  -- Sulfasalazine - did not control symptoms    DRUG MONITORING  TPMT enzyme activity: --    6-TGN/6-MMPN levels: --    Biologic concentration:  ADA level 12.4, no antibodies (every 14 days, monotherapy)    DISEASE ASSESSMENT  Labs:  CRP, ESR Results  Recent Labs   Lab Test 12/03/18  0927 06/12/18  0946   CRP <2.9 <2.9   SED 4 3     Endoscopic assessment: Flexible sigmoidoscopy 2/5/2018 shows Maddox score 0, normal or inactive disease.  Enterography: --  Fecal calprotectin: --   C diff: Negative 10/2017  pMayo score: 0/9    IBDQ Score Date IBDQ - Total Score  (Higher score better)   5/6/2019 63   12/3/2018 63       IBD healthcare maintenance based on patients  current medication:  Anti-TNF medication therapy vs Anti-integrin therapy      Vaccinations:  -- Influenza (every year): today  -- TdaP (every 10 years): 2015  -- Pneumococcal Pneumonia (once then every 5 years): recommend  -- Yearly assessment for latent Tb (verbal screening and exam, PPD or QuantiFERON-Tb testing): Normal chest xray 3/12/17    One time confirmation of immunity or serologies:  -- Hepatitis A (serologies or immunizations): Hep A Ab, Total was nonreactive ([facility])  -- Hepatitis B (serologies or immunizations): immune by serologies ([facility])  -- Varicella: Had chickenpox as a child  -- MMR: Not documented  -- HPV (all aged 18-26): Not documented  -- Meningococcal meningitis (all patients at risk for meningitis): 2005   -- Due to the immunosuppression in this patient, I would not advise administration of live vaccines such as varicella/VZV, intranasal influenza, MMR, or yellow fever vaccine (if travelling).      Bone mineral density screening   -- Recommend all patients supplement with calcium and vitamin D  -- Given prior steroid use recommend DEXA if not already done    Cancer Screening:  Colon cancer screening:  Given pancolitis colonoscopy every 1-3 years recommended after 8 years of disease.  Dysplasia screening is recommended 2024.    Cervical cancer screening: N/A    Skin cancer screening: Annual visual exam of skin by dermatologist since patient is immunocompromised    Depression Screening:  -- Over the last month, have you felt down, depressed, or hopeless? No  -- Over the last month, have you felt little interest or pleasure doing things? No    Misc:  -- Avoid tobacco use  -- Avoid NSAIDs as there is potentially a 25% chance of causing an IBD flare    RTC 6 months    Thank you for this consultation.  It was a pleasure to participate in the care of this patient; please contact us with any further questions.      Gordo Escobedo MD    [facility]  Inflammatory Bowel  Disease Program  Division of Gastroenterology, Hepatology and Nutrition        HPI:   Maddox score:   Stool freq: 0 (baseline stools frequency)  Rectal bleedin (None)  PGA: 0 (normal)  Endoscopy: Not done    1-2 stools a day, which is his baseline.  No abdominal pain, nausea or emesis.  No urgency with stools.     Headaches once a month.  Lasts for about a day, then self resolves.  Potentially more on the side - over temple.  No other symptoms with headache - vision changes or nausea.  Does not take anything for headaches.      ROS:    No fevers or chills  No weight loss  No blurry vision, double vision or change in vision  No sore throat  No lymphadenopathy  No new symptoms for headache   No paraesthesias, or weakness in a limb  No shortness of breath or wheezing  No chest pain or pressure  No arthralgias or myalgias  No rashes or skin changes  No odynophagia or dysphagia  No BRBPR, hematochezia, melena  No dysuria, frequency or urgency  No hot/cold intolerance or polyria  No anxiety or depression    Extra intestinal manifestations of IBD:  No uveitis/episcleritis  No aphthous ulcers   No arthritis   No erythema nodosum/pyoderma gangrenosum.     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     Flex sig 18: eMayo 0    ALLERGIES:   No Known Allergies    PERTINENT MEDICATIONS:    Current Outpatient Medications:      adalimumab (HUMIRA PEN) 40 MG/0.8ML pen kit, Inject 0.8 mLs (40 mg) Subcutaneous every 14 days, Disp: 1.6 mL, Rfl: 2     adalimumab (HUMIRA *CF*) 40 MG/0.4ML pen kit, Inject 0.4 mLs (40 mg) Subcutaneous every 14 days (Patient not taking: Reported on 2019), Disp: 2 each, Rfl: 5    SOCIAL HISTORY:  Social History  "    Socioeconomic History     Marital status: Single     Spouse name: Not on file     Number of children: Not on file     Years of education: Not on file     Highest education level: Not on file   Occupational History     Not on file   Social Needs     Financial resource strain: Not on file     Food insecurity:     Worry: Not on file     Inability: Not on file     Transportation needs:     Medical: Not on file     Non-medical: Not on file   Tobacco Use     Smoking status: Never Smoker     Smokeless tobacco: Never Used   Substance and Sexual Activity     Alcohol use: No     Drug use: No     Sexual activity: Never   Lifestyle     Physical activity:     Days per week: Not on file     Minutes per session: Not on file     Stress: Not on file   Relationships     Social connections:     Talks on phone: Not on file     Gets together: Not on file     Attends Ep[employer]alian service: Not on file     Active member of club or organization: Not on file     Attends meetings of clubs or organizations: Not on file     Relationship status: Not on file     Intimate partner violence:     Fear of current or ex partner: Not on file     Emotionally abused: Not on file     Physically abused: Not on file     Forced sexual activity: Not on file   Other Topics Concern     Parent/sibling w/ CABG, MI or angioplasty before 65F 55M? Not Asked   Social History Narrative     Not on file     FAMILY HISTORY:  No CRC, no IBD  Past/family/social history reviewed and no changes    PHYSICAL EXAMINATION:  Constitutional: aaox3, cooperative, pleasant, not dyspneic/diaphoretic, no acute distress  Vitals reviewed: /80   Pulse 57   Ht 1.854 m (6' 1\")   Wt 97.5 kg (215 lb)   SpO2 100%   BMI 28.37 kg/m     Wt:   Wt Readings from Last 2 Encounters:   05/07/19 97.5 kg (215 lb)   12/03/18 92.1 kg (203 lb)      Eyes: Sclera anicteric/injected  Ears/nose/mouth/throat: Normal oropharynx without ulcers or exudate, mucus membranes moist, hearing intact  Neck: " supple, thyroid normal size  CV: No edema  Respiratory: Unlabored breathing  Lymph: No axillary, submandibular, supraclavicular or inguinal lymphadenopathy  Abd: Nondistended, +bs, no hepatosplenomegaly, nontender, no peritoneal signs  Skin: warm, perfused, no jaundice  Psych: Normal affect  MSK: Normal gait      PERTINENT STUDIES:  Most recent CBC:  Recent Labs   Lab Test 12/31/18  0928 12/03/18  0927   WBC 5.9 3.6*   HGB 15.0 14.6   HCT 44.5 44.7    171     Most recent hepatic panel:  Recent Labs   Lab Test 12/03/18  0927 06/12/18  0946   ALT 23 33   AST 20 91*     Most recent creatinine:  Recent Labs   Lab Test 03/12/17  1418   CR 0.99       Again, thank you for allowing me to participate in the care of your patient.      Sincerely,    Gordo Escobedo MD

## 2019-05-07 NOTE — PROGRESS NOTES
IBD CLINIC VISIT     CC/REFERRING MD:  Self referred  REASON FOR FOLLOW UP: Ulcerative colitis    ASSESSMENT/PLAN  This is a 29 year old male with a history of ulcerative pancolitis who was recently started on adalimumab therapy.      1.  Ulcerative pancolitis: Continues in steroid free clinical and endoscopic remission on adalimumab monotherapy.  Is doing extremely well and we should work to maintain his response. Adalimumab is within range with level 12.4, on monotherapy every 14 days.  -- Continue adalimumab every 14 days  -- Blood work every 3-4 months on adalimumab: CBC, LFTs, CRP, ESR    2.  Joint pain: substantially improved and likely related to his underlying IBD.      IBD HISTORY  Age at diagnosis: 26, 2017  Extent of disease: Pancolitis  Current UC medications:   -- Adalimumab (started Nov, 2017) every 14 days  Prior UC surgeries: None  Prior IBD Medications:   -- Lialda - flu like illness with fevers, abdominal pains  -- Apriso - flu like illlness  -- Sulfasalazine - did not control symptoms    DRUG MONITORING  TPMT enzyme activity: --    6-TGN/6-MMPN levels: --    Biologic concentration:  ADA level 12.4, no antibodies (every 14 days, monotherapy)    DISEASE ASSESSMENT  Labs:  CRP, ESR Results  Recent Labs   Lab Test 12/03/18  0927 06/12/18  0946   CRP <2.9 <2.9   SED 4 3     Endoscopic assessment: Flexible sigmoidoscopy 2/5/2018 shows Maddox score 0, normal or inactive disease.  Enterography: --  Fecal calprotectin: --   C diff: Negative 10/2017  pMayo score: 0/9    IBDQ Score Date IBDQ - Total Score  (Higher score better)   5/6/2019 63   12/3/2018 63       IBD healthcare maintenance based on patients current medication:  Anti-TNF medication therapy vs Anti-integrin therapy      Vaccinations:  -- Influenza (every year): today  -- TdaP (every 10 years): 2015  -- Pneumococcal Pneumonia (once then every 5 years): recommend  -- Yearly assessment for latent Tb (verbal screening and exam, PPD or QuantiFERON-Tb  testing): Normal chest xray 3/12/17    One time confirmation of immunity or serologies:  -- Hepatitis A (serologies or immunizations): Hep A Ab, Total was nonreactive ([facility])  -- Hepatitis B (serologies or immunizations): immune by serologies ([facility])  -- Varicella: Had chickenpox as a child  -- MMR: Not documented  -- HPV (all aged 18-26): Not documented  -- Meningococcal meningitis (all patients at risk for meningitis):    -- Due to the immunosuppression in this patient, I would not advise administration of live vaccines such as varicella/VZV, intranasal influenza, MMR, or yellow fever vaccine (if travelling).      Bone mineral density screening   -- Recommend all patients supplement with calcium and vitamin D  -- Given prior steroid use recommend DEXA if not already done    Cancer Screening:  Colon cancer screening:  Given pancolitis colonoscopy every 1-3 years recommended after 8 years of disease.  Dysplasia screening is recommended .    Cervical cancer screening: N/A    Skin cancer screening: Annual visual exam of skin by dermatologist since patient is immunocompromised    Depression Screening:  -- Over the last month, have you felt down, depressed, or hopeless? No  -- Over the last month, have you felt little interest or pleasure doing things? No    Misc:  -- Avoid tobacco use  -- Avoid NSAIDs as there is potentially a 25% chance of causing an IBD flare    RTC 6 months    Thank you for this consultation.  It was a pleasure to participate in the care of this patient; please contact us with any further questions.      Gordo Escobedo MD    [facility]  Inflammatory Bowel Disease Program  Division of Gastroenterology, Hepatology and Nutrition        HPI:   Maddox score:   Stool freq: 0 (baseline stools frequency)  Rectal bleedin (None)  PGA: 0 (normal)  Endoscopy: Not done    1-2 stools a day, which is his baseline.  No abdominal pain, nausea or emesis.  No urgency with stools.      Headaches once a month.  Lasts for about a day, then self resolves.  Potentially more on the side - over temple.  No other symptoms with headache - vision changes or nausea.  Does not take anything for headaches.      ROS:    No fevers or chills  No weight loss  No blurry vision, double vision or change in vision  No sore throat  No lymphadenopathy  No new symptoms for headache   No paraesthesias, or weakness in a limb  No shortness of breath or wheezing  No chest pain or pressure  No arthralgias or myalgias  No rashes or skin changes  No odynophagia or dysphagia  No BRBPR, hematochezia, melena  No dysuria, frequency or urgency  No hot/cold intolerance or polyria  No anxiety or depression    Extra intestinal manifestations of IBD:  No uveitis/episcleritis  No aphthous ulcers   No arthritis   No erythema nodosum/pyoderma gangrenosum.     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     Flex sig 2/5/18: eMayo 0    ALLERGIES:   No Known Allergies    PERTINENT MEDICATIONS:    Current Outpatient Medications:      adalimumab (HUMIRA PEN) 40 MG/0.8ML pen kit, Inject 0.8 mLs (40 mg) Subcutaneous every 14 days, Disp: 1.6 mL, Rfl: 2     adalimumab (HUMIRA *CF*) 40 MG/0.4ML pen kit, Inject 0.4 mLs (40 mg) Subcutaneous every 14 days (Patient not taking: Reported on 5/7/2019), Disp: 2 each, Rfl: 5    SOCIAL HISTORY:  Social History     Socioeconomic History     Marital status: Single     Spouse name: Not on file     Number of children: Not on file     Years of education: Not on file     Highest education level: Not on file   Occupational History     Not on file   Social Needs     Financial resource strain: Not on file     Food insecurity:      "Worry: Not on file     Inability: Not on file     Transportation needs:     Medical: Not on file     Non-medical: Not on file   Tobacco Use     Smoking status: Never Smoker     Smokeless tobacco: Never Used   Substance and Sexual Activity     Alcohol use: No     Drug use: No     Sexual activity: Never   Lifestyle     Physical activity:     Days per week: Not on file     Minutes per session: Not on file     Stress: Not on file   Relationships     Social connections:     Talks on phone: Not on file     Gets together: Not on file     Attends [employer]ism service: Not on file     Active member of club or organization: Not on file     Attends meetings of clubs or organizations: Not on file     Relationship status: Not on file     Intimate partner violence:     Fear of current or ex partner: Not on file     Emotionally abused: Not on file     Physically abused: Not on file     Forced sexual activity: Not on file   Other Topics Concern     Parent/sibling w/ CABG, MI or angioplasty before 65F 55M? Not Asked   Social History Narrative     Not on file     FAMILY HISTORY:  No CRC, no IBD  Past/family/social history reviewed and no changes    PHYSICAL EXAMINATION:  Constitutional: aaox3, cooperative, pleasant, not dyspneic/diaphoretic, no acute distress  Vitals reviewed: /80   Pulse 57   Ht 1.854 m (6' 1\")   Wt 97.5 kg (215 lb)   SpO2 100%   BMI 28.37 kg/m    Wt:   Wt Readings from Last 2 Encounters:   05/07/19 97.5 kg (215 lb)   12/03/18 92.1 kg (203 lb)      Eyes: Sclera anicteric/injected  Ears/nose/mouth/throat: Normal oropharynx without ulcers or exudate, mucus membranes moist, hearing intact  Neck: supple, thyroid normal size  CV: No edema  Respiratory: Unlabored breathing  Lymph: No axillary, submandibular, supraclavicular or inguinal lymphadenopathy  Abd: Nondistended, +bs, no hepatosplenomegaly, nontender, no peritoneal signs  Skin: warm, perfused, no jaundice  Psych: Normal affect  MSK: Normal " gait      PERTINENT STUDIES:  Most recent CBC:  Recent Labs   Lab Test 12/31/18  0928 12/03/18  0927   WBC 5.9 3.6*   HGB 15.0 14.6   HCT 44.5 44.7    171     Most recent hepatic panel:  Recent Labs   Lab Test 12/03/18  0927 06/12/18  0946   ALT 23 33   AST 20 91*     Most recent creatinine:  Recent Labs   Lab Test 03/12/17  1418   CR 0.99       Answers for HPI/ROS submitted by the patient on 5/6/2019   General Symptoms: No  Skin Symptoms: No  HENT Symptoms: No  EYE SYMPTOMS: No  HEART SYMPTOMS: No  LUNG SYMPTOMS: No  INTESTINAL SYMPTOMS: No  URINARY SYMPTOMS: Yes  REPRODUCTIVE SYMPTOMS: No  SKELETAL SYMPTOMS: No  BLOOD SYMPTOMS: No  NERVOUS SYSTEM SYMPTOMS: No  MENTAL HEALTH SYMPTOMS: No  Trouble holding urine or incontinence: Yes  Pain or burning: No  Trouble starting or stopping: Yes  Increased frequency of urination: No  Blood in urine: No  Decreased frequency of urination: No  Frequent nighttime urination: Yes  Flank pain: No  Difficulty emptying bladder: Yes

## 2019-05-07 NOTE — PATIENT INSTRUCTIONS
It was a pleasure taking care of you today.  I've included a brief summary of our discussion and care plan from today's visit below.  Please review this information with your primary care provider.  _______________________________________________________________________    My recommendations are summarized as follows:    --  Blood work now (this month)    --  Blood work again in summer (3 months from now)    Return to GI Clinic in 6 months to review your progress.    _______________________________________________________________________    Who do I call with any questions after my visit?  Please be in touch if there are any further questions that arise following today's visit.  There are multiple ways to contact your gastroenterology care team.        During business hours, you may reach a Gastroenterology nurse at [phone].      To schedule or reschedule an appointment, please call [phone].       You can always send a secure message through Tweegee.  Tweegee messages are answered by your nurse or doctor typically within 24 hours.  Please allow extra time on weekends and holidays.        For urgent/emergent questions after business hours, you may reach the on-call GI Fellow by contacting the [facility]  at [phone].     How will I get the results of any tests ordered?    You will receive all of your results.  If you have signed up for Tweegee, any tests ordered at your visit will be available to you after your physician reviews them.  Typically this takes 1-2 weeks.  If there are urgent results that require a change in your care plan, your physician or nurse will call you to discuss the next steps.      What is Tweegee?  Tweegee is a secure way for you to access all of your healthcare records from the [facility].  It is a web based computer program, so you can sign on to it from any location.  It also allows you to send secure messages to your care team.  I recommend  signing up for [employer] access if you have not already done so and are comfortable with using a computer.      How to I schedule a follow-up visit?  If you did not schedule a follow-up visit today, please call [phone] to schedule a follow-up office visit.        Sincerely,    Gordo Escobedo MD    [facility]  Division of Gastroenterology

## 2019-05-31 DIAGNOSIS — K51.00 ULCERATIVE PANCOLITIS WITHOUT COMPLICATION (H): ICD-10-CM

## 2019-05-31 LAB
ALBUMIN SERPL-MCNC: 4.3 G/DL (ref 3.4–5)
ALP SERPL-CCNC: 51 U/L (ref 40–150)
ALT SERPL W P-5'-P-CCNC: 23 U/L (ref 0–70)
AST SERPL W P-5'-P-CCNC: 19 U/L (ref 0–45)
BASOPHILS # BLD AUTO: 0.1 10E9/L (ref 0–0.2)
BASOPHILS NFR BLD AUTO: 0.9 %
BILIRUB DIRECT SERPL-MCNC: 0.1 MG/DL (ref 0–0.2)
BILIRUB SERPL-MCNC: 0.7 MG/DL (ref 0.2–1.3)
DIFFERENTIAL METHOD BLD: NORMAL
EOSINOPHIL # BLD AUTO: 0.1 10E9/L (ref 0–0.7)
EOSINOPHIL NFR BLD AUTO: 2.3 %
ERYTHROCYTE [DISTWIDTH] IN BLOOD BY AUTOMATED COUNT: 12.1 % (ref 10–15)
HCT VFR BLD AUTO: 41.3 % (ref 40–53)
HGB BLD-MCNC: 14.2 G/DL (ref 13.3–17.7)
IMM GRANULOCYTES # BLD: 0 10E9/L (ref 0–0.4)
IMM GRANULOCYTES NFR BLD: 0.2 %
LYMPHOCYTES # BLD AUTO: 2.1 10E9/L (ref 0.8–5.3)
LYMPHOCYTES NFR BLD AUTO: 37.8 %
MCH RBC QN AUTO: 29.9 PG (ref 26.5–33)
MCHC RBC AUTO-ENTMCNC: 34.4 G/DL (ref 31.5–36.5)
MCV RBC AUTO: 87 FL (ref 78–100)
MONOCYTES # BLD AUTO: 0.4 10E9/L (ref 0–1.3)
MONOCYTES NFR BLD AUTO: 6.4 %
NEUTROPHILS # BLD AUTO: 3 10E9/L (ref 1.6–8.3)
NEUTROPHILS NFR BLD AUTO: 52.4 %
PLATELET # BLD AUTO: 220 10E9/L (ref 150–450)
PROT SERPL-MCNC: 7.7 G/DL (ref 6.8–8.8)
RBC # BLD AUTO: 4.75 10E12/L (ref 4.4–5.9)
WBC # BLD AUTO: 5.7 10E9/L (ref 4–11)

## 2019-05-31 PROCEDURE — 36415 COLL VENOUS BLD VENIPUNCTURE: CPT | Performed by: INTERNAL MEDICINE

## 2019-05-31 PROCEDURE — 85025 COMPLETE CBC W/AUTO DIFF WBC: CPT | Performed by: INTERNAL MEDICINE

## 2019-05-31 PROCEDURE — 80076 HEPATIC FUNCTION PANEL: CPT | Performed by: INTERNAL MEDICINE

## 2019-06-10 DIAGNOSIS — K51.00 ULCERATIVE PANCOLITIS WITHOUT COMPLICATION (H): ICD-10-CM

## 2019-06-11 NOTE — TELEPHONE ENCOUNTER
adalimumab (HUMIRA *CF*) 40 MG/0.4ML pen kit      Last Written Prescription Date:  12/3/18  Last Fill Quantity: 2 each,   # refills: 5  Last Office Visit : 5/7/19  Future Office visit:  11/18/19    Routing refill request to provider for review/approval because:  Medication not on the standing order GI refill protocol.

## 2019-10-04 ENCOUNTER — ALLIED HEALTH/NURSE VISIT (OUTPATIENT)
Dept: PEDIATRICS | Facility: CLINIC | Age: 29
End: 2019-10-04
Payer: COMMERCIAL

## 2019-10-04 DIAGNOSIS — Z23 ENCOUNTER FOR IMMUNIZATION: Primary | ICD-10-CM

## 2019-10-04 DIAGNOSIS — K51.00 ULCERATIVE PANCOLITIS WITHOUT COMPLICATION (H): ICD-10-CM

## 2019-10-04 LAB
ALBUMIN SERPL-MCNC: 4.4 G/DL (ref 3.4–5)
ALP SERPL-CCNC: 56 U/L (ref 40–150)
ALT SERPL W P-5'-P-CCNC: 27 U/L (ref 0–70)
AST SERPL W P-5'-P-CCNC: 20 U/L (ref 0–45)
BASOPHILS # BLD AUTO: 0 10E9/L (ref 0–0.2)
BASOPHILS NFR BLD AUTO: 0.7 %
BILIRUB DIRECT SERPL-MCNC: 0.1 MG/DL (ref 0–0.2)
BILIRUB SERPL-MCNC: 0.6 MG/DL (ref 0.2–1.3)
DIFFERENTIAL METHOD BLD: NORMAL
EOSINOPHIL # BLD AUTO: 0.2 10E9/L (ref 0–0.7)
EOSINOPHIL NFR BLD AUTO: 2.9 %
ERYTHROCYTE [DISTWIDTH] IN BLOOD BY AUTOMATED COUNT: 12 % (ref 10–15)
HCT VFR BLD AUTO: 44.3 % (ref 40–53)
HGB BLD-MCNC: 15 G/DL (ref 13.3–17.7)
IMM GRANULOCYTES # BLD: 0 10E9/L (ref 0–0.4)
IMM GRANULOCYTES NFR BLD: 0.2 %
LYMPHOCYTES # BLD AUTO: 1.8 10E9/L (ref 0.8–5.3)
LYMPHOCYTES NFR BLD AUTO: 33.6 %
MCH RBC QN AUTO: 29.6 PG (ref 26.5–33)
MCHC RBC AUTO-ENTMCNC: 33.9 G/DL (ref 31.5–36.5)
MCV RBC AUTO: 88 FL (ref 78–100)
MONOCYTES # BLD AUTO: 0.5 10E9/L (ref 0–1.3)
MONOCYTES NFR BLD AUTO: 8.4 %
NEUTROPHILS # BLD AUTO: 3 10E9/L (ref 1.6–8.3)
NEUTROPHILS NFR BLD AUTO: 54.2 %
PLATELET # BLD AUTO: 212 10E9/L (ref 150–450)
PROT SERPL-MCNC: 8.3 G/DL (ref 6.8–8.8)
RBC # BLD AUTO: 5.06 10E12/L (ref 4.4–5.9)
WBC # BLD AUTO: 5.5 10E9/L (ref 4–11)

## 2019-10-04 PROCEDURE — 80076 HEPATIC FUNCTION PANEL: CPT | Performed by: INTERNAL MEDICINE

## 2019-10-04 PROCEDURE — 90471 IMMUNIZATION ADMIN: CPT

## 2019-10-04 PROCEDURE — 85025 COMPLETE CBC W/AUTO DIFF WBC: CPT | Performed by: INTERNAL MEDICINE

## 2019-10-04 PROCEDURE — 90686 IIV4 VACC NO PRSV 0.5 ML IM: CPT

## 2019-10-04 PROCEDURE — 36415 COLL VENOUS BLD VENIPUNCTURE: CPT | Performed by: INTERNAL MEDICINE

## 2019-10-04 PROCEDURE — 99207 ZZC NO CHARGE NURSE ONLY: CPT

## 2019-10-04 NOTE — PROGRESS NOTES
Prior to immunization administration, verified patients identity using patient s name and date of birth. Please see Immunization Activity for additional information.     Screening Questionnaire for Adult Immunization    Are you sick today?   No   Do you have allergies to medications, food, a vaccine component or latex?   No   Have you ever had a serious reaction after receiving a vaccination?   No   Do you have a long-term health problem with heart disease, lung disease, asthma, kidney disease, metabolic disease (e.g. diabetes), anemia, or other blood disorder?   No   Do you have cancer, leukemia, HIV/AIDS, or any other immune system problem?   No   In the past 3 months, have you taken medications that affect  your immune system, such as prednisone, other steroids, or anticancer drugs; drugs for the treatment of rheumatoid arthritis, Crohn s disease, or psoriasis; or have you had radiation treatments?   No   Have you had a seizure, or a brain or other nervous system problem?   No   During the past year, have you received a transfusion of blood or blood     products, or been given immune (gamma) globulin or antiviral drug?   No   For women: Are you pregnant or is there a chance you could become        pregnant during the next month?   No   Have you received any vaccinations in the past 4 weeks?   No     Immunization questionnaire answers were all negative.        Per orders of Dr. Justine Harkins, injection of flu given by Enzo Garcia. Patient instructed to remain in clinic for 15 minutes afterwards, and to report any adverse reaction to me immediately.       Screening performed by Enzo Garcia on 10/4/2019 at 11:17 AM.

## 2019-10-17 ENCOUNTER — TELEPHONE (OUTPATIENT)
Dept: GASTROENTEROLOGY | Facility: CLINIC | Age: 29
End: 2019-10-17

## 2019-10-17 NOTE — TELEPHONE ENCOUNTER
PA Initiation    Medication: HUMIRA   Insurance Company: [employer] - Phone [phone] Fax [phone]  Pharmacy Filling the Rx: [facility] [place]  Filling Pharmacy Phone:    Filling Pharmacy Fax:    Start Date: 10/17/2019

## 2019-10-18 NOTE — TELEPHONE ENCOUNTER
Prior Authorization Approval    Authorization Effective Date: 10/17/2019  Authorization Expiration Date: 10/17/2021  Medication: HUMIRA - Approved  Approved Dose/Quantity:  2 for 28 days  Reference #: [id number]   Insurance Company: [employer] [phone] Fax [phone]  Expected CoPay: $5     CoPay Card Available:      Foundation Assistance Needed:    Which Pharmacy is filling the prescription (Not needed for infusion/clinic administered): [facility] [place]  Pharmacy Notified: Yes - faxed approval  Patient Notified: Yes - via [employer]

## 2019-10-30 DIAGNOSIS — K51.00 ULCERATIVE PANCOLITIS WITHOUT COMPLICATION (H): ICD-10-CM

## 2019-11-04 NOTE — TELEPHONE ENCOUNTER
Last Clinic Visit: 5/7/2019  [facility] Gastroenterology and IBD Clinic    RTC 6 mo- pending appt 11/18/19 with GINA ROTH

## 2019-11-07 ENCOUNTER — HEALTH MAINTENANCE LETTER (OUTPATIENT)
Age: 29
End: 2019-11-07

## 2019-11-12 ENCOUNTER — TELEPHONE (OUTPATIENT)
Dept: GASTROENTEROLOGY | Facility: CLINIC | Age: 29
End: 2019-11-12

## 2019-11-12 NOTE — TELEPHONE ENCOUNTER
Called and left message for patient reminding of appointment scheduled on 11/18/19 at 1220 with [facility] GI clinic. Patient to arrive 15 min early. To reschedule or cancel patient to call [phone].    TANIKA Lunsford

## 2019-11-18 ENCOUNTER — OFFICE VISIT (OUTPATIENT)
Dept: GASTROENTEROLOGY | Facility: CLINIC | Age: 29
End: 2019-11-18
Payer: COMMERCIAL

## 2019-11-18 VITALS
WEIGHT: 207.5 LBS | BODY MASS INDEX: 27.5 KG/M2 | HEIGHT: 73 IN | DIASTOLIC BLOOD PRESSURE: 77 MMHG | SYSTOLIC BLOOD PRESSURE: 137 MMHG | HEART RATE: 57 BPM | OXYGEN SATURATION: 98 % | TEMPERATURE: 99.4 F

## 2019-11-18 DIAGNOSIS — D84.9 IMMUNOSUPPRESSION (H): Primary | ICD-10-CM

## 2019-11-18 ASSESSMENT — PAIN SCALES - GENERAL: PAINLEVEL: NO PAIN (0)

## 2019-11-18 ASSESSMENT — MIFFLIN-ST. JEOR: SCORE: 1970.09

## 2019-11-18 NOTE — PROGRESS NOTES
IBD CLINIC VISIT     CC/REFERRING MD:  Self referred  REASON FOR FOLLOW UP: Ulcerative colitis    IBD HISTORY  Age at diagnosis: 26, 2017  Extent of disease: Pancolitis  Current UC medications:   -- Adalimumab (started Nov 2017) every 14 days  Prior UC surgeries: None  Prior IBD Medications:   -- Lialda - flu like illness with fevers, abdominal pains  -- Apriso - flu like illlness  -- Sulfasalazine - did not control symptoms    DRUG MONITORING  TPMT enzyme activity: --    6-TGN/6-MMPN levels: --    Biologic concentration:  ADA level 12.4, no antibodies (every 14 days, monotherapy)    DISEASE ASSESSMENT  Labs:  CRP, ESR Results  Recent Labs   Lab Test 12/03/18  0927 06/12/18  0946   CRP <2.9 <2.9   SED 4 3     Endoscopic assessment: Flexible sigmoidoscopy 2/5/2018 shows Maddox score 0, normal or inactive disease.  Enterography: --  Fecal calprotectin: --   C diff: Negative 10/2017  pMayo score: 0/9    IBDQ Score Date IBDQ - Total Score  (Higher score better)   5/6/2019 63   12/3/2018 63       ASSESSMENT/PLAN  This is a 29 year old male with a history of ulcerative pancolitis who was recently started on adalimumab therapy.      1.  Ulcerative pancolitis: Continues in steroid free clinical and endoscopic remission on adalimumab monotherapy.  Is doing extremely well and we should work to maintain his response. Adalimumab is within range with level 12.4, on monotherapy every 14 days.  -- Continue adalimumab every 14 days  -- Blood work every 3-4 months on adalimumab: CBC, LFTs, CRP, ESR    2.  Joint pain: substantially improved and likely related to his underlying IBD.      IBD healthcare maintenance based on patients current medication:  Anti-TNF medication therapy vs Anti-integrin therapy      Vaccinations:  -- Influenza (every year): Fall 2019  -- TdaP (every 10 years): 2015  -- Pneumococcal Pneumonia (once then every 5 years): recommend  -- Yearly assessment for latent Tb (verbal screening and exam, PPD or  QuantiFERON-Tb testing): ORDER PLACED TO BE DRAWN AT NEXT LAB     One time confirmation of immunity or serologies:  -- Hepatitis A (serologies or immunizations): Hep A Ab, Total was nonreactive ([facility])  -- Hepatitis B (serologies or immunizations): immune by serologies ([facility])  -- Varicella: Had chickenpox as a child  -- MMR: Not documented  -- HPV (all aged 18-26): Not documented  -- Meningococcal meningitis (all patients at risk for meningitis):    -- Due to the immunosuppression in this patient, I would not advise administration of live vaccines such as varicella/VZV, intranasal influenza, MMR, or yellow fever vaccine (if travelling).      Bone mineral density screening   -- Recommend all patients supplement with calcium and vitamin D  -- Given prior steroid use recommend DEXA if not already done    Cancer Screening:  Colon cancer screening:  Given pancolitis colonoscopy every 1-3 years recommended after 8 years of disease.  Dysplasia screening is recommended .    Cervical cancer screening: N/A    Skin cancer screening: Annual visual exam of skin by dermatologist since patient is immunocompromised    Depression Screening:  -- Over the last month, have you felt down, depressed, or hopeless? No  -- Over the last month, have you felt little interest or pleasure doing things? No    Misc:  -- Avoid tobacco use  -- Avoid NSAIDs as there is potentially a 25% chance of causing an IBD flare    RTC 6 months    Thank you for this consultation.  It was a pleasure to participate in the care of this patient; please contact us with any further questions.      Miguel Turner PA-C  Division of Gastroenterology, Hepatology and Nutrition  [facility]     HPI:   Maddox score:   Stool freq: 0 (baseline stools frequency)  Rectal bleedin (None)  PGA: 0 (normal)  Endoscopy: Not done    Remission: <3   Mild disease: 3-5  Moderate disease: 6-10  Severe disease: >10    1-2 stools a day, which is his baseline.  No  abdominal pain, nausea or emesis.  No urgency or nocturnal stools. No blood in the stool.    Headaches once a month.  Lasts for about a day, then self resolves.  Potentially more on the side - over temple.  No other symptoms with headache - vision changes or nausea.  Does not take anything for headaches.      Has 4 month old at home. Loves being a Dad.    ROS:    No fevers or chills  No weight loss  No blurry vision, double vision or change in vision  No sore throat  No lymphadenopathy  No new symptoms for headache (gets HA once per month, eventually will resolve)  No paraesthesias, or weakness in a limb  No shortness of breath or wheezing  No chest pain or pressure  No arthralgias or myalgias  No rashes or skin changes  No odynophagia or dysphagia  No BRBPR, hematochezia, melena  No dysuria, frequency or urgency  No hot/cold intolerance or polyria  No anxiety or depression    Extra intestinal manifestations of IBD:  No uveitis/episcleritis  No aphthous ulcers   No arthritis   No erythema nodosum/pyoderma gangrenosum.     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     Flex sig 2/5/18: eMayo 0    ALLERGIES:   No Known Allergies    PERTINENT MEDICATIONS:    Current Outpatient Medications:      adalimumab (HUMIRA *CF* PEN) 40 MG/0.4ML pen kit, Inject 0.4 mLs (40 mg) Subcutaneous every 14 days, Disp: 2 each, Rfl: 0    SOCIAL HISTORY:  Social History     Socioeconomic History     Marital status: Single     Spouse name: Not on file     Number of children: Not on file     Years of education: Not on file     Highest education level: Not on file   Occupational History     Not on file   Social Needs     Financial resource strain: Not on file  "    Food insecurity:     Worry: Not on file     Inability: Not on file     Transportation needs:     Medical: Not on file     Non-medical: Not on file   Tobacco Use     Smoking status: Never Smoker     Smokeless tobacco: Never Used   Substance and Sexual Activity     Alcohol use: No     Drug use: No     Sexual activity: Never   Lifestyle     Physical activity:     Days per week: Not on file     Minutes per session: Not on file     Stress: Not on file   Relationships     Social connections:     Talks on phone: Not on file     Gets together: Not on file     Attends Sabianist service: Not on file     Active member of club or organization: Not on file     Attends meetings of clubs or organizations: Not on file     Relationship status: Not on file     Intimate partner violence:     Fear of current or ex partner: Not on file     Emotionally abused: Not on file     Physically abused: Not on file     Forced sexual activity: Not on file   Other Topics Concern     Parent/sibling w/ CABG, MI or angioplasty before 65F 55M? Not Asked   Social History Narrative     Not on file     FAMILY HISTORY:  No CRC, no IBD  Past/family/social history reviewed and no changes    PHYSICAL EXAMINATION:  Constitutional: aaox3, cooperative, pleasant, not dyspneic/diaphoretic, no acute distress  Vitals reviewed: /77   Pulse 57   Temp 99.4  F (37.4  C) (Oral)   Ht 1.854 m (6' 1\")   Wt 94.1 kg (207 lb 8 oz)   SpO2 98%   BMI 27.38 kg/m    Wt:   Wt Readings from Last 2 Encounters:   11/18/19 94.1 kg (207 lb 8 oz)   05/07/19 97.5 kg (215 lb)      Eyes: Sclera anicteric/injected  Ears/nose/mouth/throat: Normal oropharynx without ulcers or exudate, mucus membranes moist, hearing intact  Neck: supple, thyroid normal size  CV: No edema  Respiratory: Unlabored breathing  Lymph: No axillary, submandibular, supraclavicular or inguinal lymphadenopathy  Abd: Nondistended, +bs, no hepatosplenomegaly, nontender, no peritoneal signs  Skin: warm, " perfused, no jaundice  Psych: Normal affect  MSK: Normal gait      PERTINENT STUDIES:  Most recent CBC:  Recent Labs   Lab Test 10/04/19  1155 05/31/19  1323   WBC 5.5 5.7   HGB 15.0 14.2   HCT 44.3 41.3    220     Most recent hepatic panel:  Recent Labs   Lab Test 10/04/19  1155 05/31/19  1323   ALT 27 23   AST 20 19     Most recent creatinine:  Recent Labs   Lab Test 03/12/17  1418   CR 0.99       Answers for HPI/ROS submitted by the patient on 5/6/2019   General Symptoms: No  Skin Symptoms: No  HENT Symptoms: No  EYE SYMPTOMS: No  HEART SYMPTOMS: No  LUNG SYMPTOMS: No  INTESTINAL SYMPTOMS: No  URINARY SYMPTOMS: Yes  REPRODUCTIVE SYMPTOMS: No  SKELETAL SYMPTOMS: No  BLOOD SYMPTOMS: No  NERVOUS SYSTEM SYMPTOMS: No  MENTAL HEALTH SYMPTOMS: No  Trouble holding urine or incontinence: Yes  Pain or burning: No  Trouble starting or stopping: Yes  Increased frequency of urination: No  Blood in urine: No  Decreased frequency of urination: No  Frequent nighttime urination: Yes  Flank pain: No  Difficulty emptying bladder: Yes

## 2019-11-18 NOTE — PATIENT INSTRUCTIONS
It was a pleasure taking care of you today.  I've included a brief summary of our discussion and care plan from today's visit below.  Please review this information with your primary care provider.  ______________________________________________________________________    My recommendations are summarized as follows:    -- Continue humira every other week  -- Labs every 3 months   -- Next endoscopic assessment: 2024 unless symptomatic sooner  -- Patient with IBD we recommend supplementation vitamin D 1000 units daily and calcium 500 mg twice daily.  -- Vaccines/immunizations to be updated: Recommend yearly flu shot, pneumonia vaccines (Prevnar 13 then 8 weeks later Pneumovax 23 then 5 years later Pneumovax 23), tetanus every 10 years.  -- Yearly Dermatology visit for skin check while on immunosuppressive therapy. Can call [phone] to schedule.  -- No NSAIDs (ibuprofen, or anything containing ibuprofen)       For additional resources about inflammatory bowel disease visit [email or web address]      Return to GI Clinic in 6 months to review your progress.    ______________________________________________________________________    Who do I call with any questions after my visit?  Please be in touch if there are any further questions that arise following today's visit.  There are multiple ways to contact your gastroenterology care team.        During business hours, you may reach a Gastroenterology nurse at [phone], option 3.       To schedule or reschedule an appointment, please call [phone].       You can always send a secure message through [employer].  [employer] messages are answered by your nurse or doctor typically within 24 hours.  Please allow extra time on weekends and holidays.        For urgent/emergent questions after business hours, you may reach the on-call GI Fellow by contacting the [facility] at [phone].      In order for your refill to be processed  in a timely fashion, it is your responsibility to ensure you follow the recommendations from your provider regarding your laboratory studies and follow up appointments.       How will I get the results of any tests ordered?    You will receive all of your results.  If you have signed up for [facility], any tests ordered at your visit will be available to you after your physician reviews them.  Typically this takes 1-2 weeks.  If there are urgent results that require a change in your care plan, your physician or nurse will call you to discuss the next steps.      What is [employer] Messenger?  Class Messenger is a secure way for you to access all of your healthcare records from the [facility].  It is a web based computer program, so you can sign on to it from any location.  It also allows you to send secure messages to your care team.  I recommend signing up for [employer] Messenger access if you have not already done so and are comfortable with using a computer.      How to I schedule a follow-up visit?  If you did not schedule a follow-up visit today, please call [phone] to schedule a follow-up office visit.        Sincerely,    Miguel Turner PA-C  [facility]  Division of Gastroenterology

## 2019-11-18 NOTE — NURSING NOTE
"Chief Complaint   Patient presents with     RECHECK     6 months follow up       Vitals:    11/18/19 1204   BP: 137/77   Pulse: 57   Temp: 99.4  F (37.4  C)   TempSrc: Oral   SpO2: 98%   Weight: 94.1 kg (207 lb 8 oz)   Height: 1.854 m (6' 1\")       Body mass index is 27.38 kg/m .    Angela Weaver CMA    "

## 2019-11-18 NOTE — LETTER
11/18/2019       RE: Kyle Coelho  [address]  [place] [facility] [place] [address]     Dear Colleague,    Thank you for referring your patient, Kyle Coelho, to the [facility] GASTROENTEROLOGY AND IBD CLINIC at [facility]. Please see a copy of my visit note below.    IBD CLINIC VISIT     CC/REFERRING MD:  Self referred  REASON FOR FOLLOW UP: Ulcerative colitis    IBD HISTORY  Age at diagnosis: 26, 2017  Extent of disease: Pancolitis  Current UC medications:   -- Adalimumab (started Nov 2017) every 14 days  Prior UC surgeries: None  Prior IBD Medications:   -- Lialda - flu like illness with fevers, abdominal pains  -- Apriso - flu like illlness  -- Sulfasalazine - did not control symptoms    DRUG MONITORING  TPMT enzyme activity: --    6-TGN/6-MMPN levels: --    Biologic concentration:  ADA level 12.4, no antibodies (every 14 days, monotherapy)    DISEASE ASSESSMENT  Labs:  CRP, ESR Results  Recent Labs   Lab Test 12/03/18  0927 06/12/18  0946   CRP <2.9 <2.9   SED 4 3     Endoscopic assessment: Flexible sigmoidoscopy 2/5/2018 shows Maddox score 0, normal or inactive disease.  Enterography: --  Fecal calprotectin: --   C diff: Negative 10/2017  pMayo score: 0/9    IBDQ Score Date IBDQ - Total Score  (Higher score better)   5/6/2019 63   12/3/2018 63       ASSESSMENT/PLAN  This is a 29 year old male with a history of ulcerative pancolitis who was recently started on adalimumab therapy.      1.  Ulcerative pancolitis: Continues in steroid free clinical and endoscopic remission on adalimumab monotherapy.  Is doing extremely well and we should work to maintain his response. Adalimumab is within range with level 12.4, on monotherapy every 14 days.  -- Continue adalimumab every 14 days  -- Blood work every 3-4 months on adalimumab: CBC, LFTs, CRP, ESR    2.  Joint pain: substantially improved and likely related to his underlying IBD.      IBD healthcare maintenance based on patients  current medication:  Anti-TNF medication therapy vs Anti-integrin therapy      Vaccinations:  -- Influenza (every year): Fall 2019  -- TdaP (every 10 years): 2015  -- Pneumococcal Pneumonia (once then every 5 years): recommend  -- Yearly assessment for latent Tb (verbal screening and exam, PPD or QuantiFERON-Tb testing): ORDER PLACED TO BE DRAWN AT NEXT LAB     One time confirmation of immunity or serologies:  -- Hepatitis A (serologies or immunizations): Hep A Ab, Total was nonreactive ([facility])  -- Hepatitis B (serologies or immunizations): immune by serologies ([facility])  -- Varicella: Had chickenpox as a child  -- MMR: Not documented  -- HPV (all aged 18-26): Not documented  -- Meningococcal meningitis (all patients at risk for meningitis): 2005   -- Due to the immunosuppression in this patient, I would not advise administration of live vaccines such as varicella/VZV, intranasal influenza, MMR, or yellow fever vaccine (if travelling).      Bone mineral density screening   -- Recommend all patients supplement with calcium and vitamin D  -- Given prior steroid use recommend DEXA if not already done    Cancer Screening:  Colon cancer screening:  Given pancolitis colonoscopy every 1-3 years recommended after 8 years of disease.  Dysplasia screening is recommended 2024.    Cervical cancer screening: N/A    Skin cancer screening: Annual visual exam of skin by dermatologist since patient is immunocompromised    Depression Screening:  -- Over the last month, have you felt down, depressed, or hopeless? No  -- Over the last month, have you felt little interest or pleasure doing things? No    Misc:  -- Avoid tobacco use  -- Avoid NSAIDs as there is potentially a 25% chance of causing an IBD flare    RTC 6 months    Thank you for this consultation.  It was a pleasure to participate in the care of this patient; please contact us with any further questions.      Miguel Turner PA-C  Division of Gastroenterology, Hepatology and  Nutrition  [facility]     HPI:   Maddox score:   Stool freq: 0 (baseline stools frequency)  Rectal bleedin (None)  PGA: 0 (normal)  Endoscopy: Not done    Remission: <3   Mild disease: 3-5  Moderate disease: 6-10  Severe disease: >10    1-2 stools a day, which is his baseline.  No abdominal pain, nausea or emesis.  No urgency or nocturnal stools. No blood in the stool.    Headaches once a month.  Lasts for about a day, then self resolves.  Potentially more on the side - over temple.  No other symptoms with headache - vision changes or nausea.  Does not take anything for headaches.      Has 4 month old at home. Loves being a Dad.    ROS:    No fevers or chills  No weight loss  No blurry vision, double vision or change in vision  No sore throat  No lymphadenopathy  No new symptoms for headache (gets HA once per month, eventually will resolve)  No paraesthesias, or weakness in a limb  No shortness of breath or wheezing  No chest pain or pressure  No arthralgias or myalgias  No rashes or skin changes  No odynophagia or dysphagia  No BRBPR, hematochezia, melena  No dysuria, frequency or urgency  No hot/cold intolerance or polyria  No anxiety or depression    Extra intestinal manifestations of IBD:  No uveitis/episcleritis  No aphthous ulcers   No arthritis   No erythema nodosum/pyoderma gangrenosum.     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     Flex sig 18: eMayo 0    ALLERGIES:   No Known Allergies    PERTINENT MEDICATIONS:    Current Outpatient Medications:      adalimumab (HUMIRA *CF* PEN) 40 MG/0.4ML pen kit, Inject 0.4 mLs (40 mg) Subcutaneous every 14 days, Disp: 2 each, Rfl: 0    SOCIAL HISTORY:  Social  "History     Socioeconomic History     Marital status: Single     Spouse name: Not on file     Number of children: Not on file     Years of education: Not on file     Highest education level: Not on file   Occupational History     Not on file   Social Needs     Financial resource strain: Not on file     Food insecurity:     Worry: Not on file     Inability: Not on file     Transportation needs:     Medical: Not on file     Non-medical: Not on file   Tobacco Use     Smoking status: Never Smoker     Smokeless tobacco: Never Used   Substance and Sexual Activity     Alcohol use: No     Drug use: No     Sexual activity: Never   Lifestyle     Physical activity:     Days per week: Not on file     Minutes per session: Not on file     Stress: Not on file   Relationships     Social connections:     Talks on phone: Not on file     Gets together: Not on file     Attends Sikhism service: Not on file     Active member of club or organization: Not on file     Attends meetings of clubs or organizations: Not on file     Relationship status: Not on file     Intimate partner violence:     Fear of current or ex partner: Not on file     Emotionally abused: Not on file     Physically abused: Not on file     Forced sexual activity: Not on file   Other Topics Concern     Parent/sibling w/ CABG, MI or angioplasty before 65F 55M? Not Asked   Social History Narrative     Not on file     FAMILY HISTORY:  No CRC, no IBD  Past/family/social history reviewed and no changes    PHYSICAL EXAMINATION:  Constitutional: aaox3, cooperative, pleasant, not dyspneic/diaphoretic, no acute distress  Vitals reviewed: /77   Pulse 57   Temp 99.4  F (37.4  C) (Oral)   Ht 1.854 m (6' 1\")   Wt 94.1 kg (207 lb 8 oz)   SpO2 98%   BMI 27.38 kg/m     Wt:   Wt Readings from Last 2 Encounters:   11/18/19 94.1 kg (207 lb 8 oz)   05/07/19 97.5 kg (215 lb)      Eyes: Sclera anicteric/injected  Ears/nose/mouth/throat: Normal oropharynx without ulcers or " exudate, mucus membranes moist, hearing intact  Neck: supple, thyroid normal size  CV: No edema  Respiratory: Unlabored breathing  Lymph: No axillary, submandibular, supraclavicular or inguinal lymphadenopathy  Abd: Nondistended, +bs, no hepatosplenomegaly, nontender, no peritoneal signs  Skin: warm, perfused, no jaundice  Psych: Normal affect  MSK: Normal gait      PERTINENT STUDIES:  Most recent CBC:  Recent Labs   Lab Test 10/04/19  1155 05/31/19  1323   WBC 5.5 5.7   HGB 15.0 14.2   HCT 44.3 41.3    220     Most recent hepatic panel:  Recent Labs   Lab Test 10/04/19  1155 05/31/19  1323   ALT 27 23   AST 20 19     Most recent creatinine:  Recent Labs   Lab Test 03/12/17  1418   CR 0.99       Again, thank you for allowing me to participate in the care of your patient.      Sincerely,    Miguel Turner PA-C

## 2019-12-04 DIAGNOSIS — K51.00 ULCERATIVE PANCOLITIS WITHOUT COMPLICATION (H): ICD-10-CM

## 2019-12-05 NOTE — TELEPHONE ENCOUNTER
Last Clinic Visit:  11/18/19  NV : NONE   CBC RESULTS:   Recent Labs   Lab Test 10/04/19  1155   WBC 5.5   RBC 5.06   HGB 15.0   HCT 44.3   MCV 88   MCH 29.6   MCHC 33.9   RDW 12.0      Liver Function Studies -   Recent Labs   Lab Test 10/04/19  1155   PROTTOTAL 8.3   ALBUMIN 4.4   BILITOTAL 0.6   ALKPHOS 56   AST 20   ALT 27

## 2020-03-06 DIAGNOSIS — D84.9 IMMUNOSUPPRESSION (H): ICD-10-CM

## 2020-03-06 PROCEDURE — 86481 TB AG RESPONSE T-CELL SUSP: CPT | Performed by: PHYSICIAN ASSISTANT

## 2020-03-06 PROCEDURE — 36415 COLL VENOUS BLD VENIPUNCTURE: CPT | Performed by: PHYSICIAN ASSISTANT

## 2020-03-09 LAB
GAMMA INTERFERON BACKGROUND BLD IA-ACNC: 0.02 IU/ML
M TB IFN-G BLD-IMP: NEGATIVE
M TB IFN-G CD4+ BCKGRND COR BLD-ACNC: >10 IU/ML
MITOGEN IGNF BCKGRD COR BLD-ACNC: 0 IU/ML
MITOGEN IGNF BCKGRD COR BLD-ACNC: 0 IU/ML

## 2020-05-19 DIAGNOSIS — K51.00 ULCERATIVE PANCOLITIS WITHOUT COMPLICATION (H): ICD-10-CM

## 2020-05-21 ENCOUNTER — TELEPHONE (OUTPATIENT)
Dept: GASTROENTEROLOGY | Facility: CLINIC | Age: 30
End: 2020-05-21

## 2020-05-21 NOTE — TELEPHONE ENCOUNTER
adalimumab (HUMIRA *CF* PEN) 40 MG/0.4ML pen kit Inject 0.4 mLs (40 mg) Subcutaneous every 14 days ** PLEASE SCHEDULE MAY 2020 APPT. (6 MOS F/U  Last Written Prescription Date:  12/5/2019  Last Fill Quantity: 6 each,   # refills: 1  Last Office Visit : 11/18/2019  Future Office visit:  None    Routing refill request to provider for review/approval because:  Labs( 3 months) and appt due within 6 months for refill. Care Everywhere reviewed, no new data.Scheduling has been notified to contact the pt for appointment.           CBC RESULTS:   Recent Labs   Lab Test 10/04/19  1155   WBC 5.5   RBC 5.06   HGB 15.0   HCT 44.3   MCV 88   MCH 29.6   MCHC 33.9   RDW 12.0          Creatinine   Date Value Ref Range Status   03/12/2017 0.99 0.66 - 1.25 mg/dL Final   ]    Liver Function Studies -   Recent Labs   Lab Test 10/04/19  1155   PROTTOTAL 8.3   ALBUMIN 4.4   BILITOTAL 0.6   ALKPHOS 56   AST 20   ALT 27

## 2020-06-08 ENCOUNTER — TELEPHONE (OUTPATIENT)
Dept: GASTROENTEROLOGY | Facility: CLINIC | Age: 30
End: 2020-06-08

## 2020-06-08 DIAGNOSIS — K51.00 ULCERATIVE PANCOLITIS WITHOUT COMPLICATION (H): ICD-10-CM

## 2020-06-08 RX ORDER — ADALIMUMAB 40MG/0.4ML
40 KIT SUBCUTANEOUS
Qty: 6 EACH | Refills: 1 | Status: SHIPPED | OUTPATIENT
Start: 2020-06-08 | End: 2020-06-22

## 2020-06-08 NOTE — TELEPHONE ENCOUNTER
[facility] Call Center    Phone Message    May a detailed message be left on voicemail: yes     Reason for Call: Medication Refill Request    Has the patient contacted the pharmacy for the refill? Yes     Name of medication being requested: adalimumab (HUMIRA *CF* PEN) 40 MG/0.4ML pen kit     Provider who prescribed the medication: Dr. Gordo Escobedo    Pharmacy: [facility]    Date medication is needed: ASAP - patient is out of refills.       Action Taken: Message routed to:  [facility]    Travel Screening: Not Applicable

## 2020-06-08 NOTE — TELEPHONE ENCOUNTER
Contacted pt that as instructed with last refill of humira that he was overdue due for an appt and labs. Pt now scheduled for appt on Megan 10. Will refill his humira.

## 2020-06-09 ENCOUNTER — PATIENT OUTREACH (OUTPATIENT)
Dept: GASTROENTEROLOGY | Facility: CLINIC | Age: 30
End: 2020-06-09

## 2020-06-09 RX ORDER — ADALIMUMAB 40MG/0.4ML
40 KIT SUBCUTANEOUS
Qty: 6 EACH | Refills: 1 | OUTPATIENT
Start: 2020-06-09

## 2020-06-09 NOTE — PROGRESS NOTES
Errror on author of this note that pt was scheduled in a clinic slot that was not availably as provider has meeting.   Pt moving the next week so pt rescheduled June 22.

## 2020-06-22 ENCOUNTER — VIRTUAL VISIT (OUTPATIENT)
Dept: GASTROENTEROLOGY | Facility: CLINIC | Age: 30
End: 2020-06-22
Payer: COMMERCIAL

## 2020-06-22 DIAGNOSIS — K51.00 ULCERATIVE PANCOLITIS WITHOUT COMPLICATION (H): ICD-10-CM

## 2020-06-22 RX ORDER — ADALIMUMAB 40MG/0.4ML
40 KIT SUBCUTANEOUS
Qty: 6 EACH | Refills: 1 | Status: SHIPPED | OUTPATIENT
Start: 2020-06-22 | End: 2020-12-22

## 2020-06-22 ASSESSMENT — PAIN SCALES - GENERAL: PAINLEVEL: NO PAIN (0)

## 2020-06-22 NOTE — PATIENT INSTRUCTIONS
It was a pleasure taking care of you today.  I've included a brief summary of our discussion and care plan from today's visit below.  Please review this information with your primary care provider.  ______________________________________________________________________    My recommendations are summarized as follows:    -- Continue humira every other week  -- Labs every 3 months   The order for this is in, can be completed at our lab. To schedule lab appointment here, use my chart or call [phone].   -- Next endoscopic assessment: 2024 unless symptomatic sooner  -- Patient with IBD we recommend supplementation vitamin D 1000 units daily and calcium 500 mg twice daily.  -- Vaccines/immunizations to be updated: Recommend yearly flu shot, pneumonia vaccines (Prevnar 13 then 8 weeks later Pneumovax 23 then 5 years later Pneumovax 23), tetanus every 10 years.  -- Yearly Dermatology visit for skin check while on immunosuppressive therapy. Can call [phone] to schedule.  -- No NSAIDs (ibuprofen, or anything containing ibuprofen)     For additional resources about inflammatory bowel disease visit [email or web address]      Return to GI Clinic in 6 months to review your progress.    ______________________________________________________________________    Who do I call with any questions after my visit?  Please be in touch if there are any further questions that arise following today's visit.  There are multiple ways to contact your gastroenterology care team.        During business hours, you may reach a Gastroenterology nurse at [phone], option 3.       To schedule or reschedule an appointment, please call [phone].       You can always send a secure message through [employer]!.  Sh[employer]! messages are answered by your nurse or doctor typically within 24 hours.  Please allow extra time on weekends and holidays.        For urgent/emergent questions after business hours, you may reach the on-call  GI Fellow by contacting the [facility]  at [phone].      In order for your refill to be processed in a timely fashion, it is your responsibility to ensure you follow the recommendations from your provider regarding your laboratory studies and follow up appointments.       How will I get the results of any tests ordered?    You will receive all of your results.  If you have signed up for [facility], any tests ordered at your visit will be available to you after your physician reviews them.  Typically this takes 1-2 weeks.  If there are urgent results that require a change in your care plan, your physician or nurse will call you to discuss the next steps.      What is [employer]?  [employer] is a secure way for you to access all of your healthcare records from the [facility].  It is a web based computer program, so you can sign on to it from any location.  It also allows you to send secure messages to your care team.  I recommend signing up for [employer] access if you have not already done so and are comfortable with using a computer.      How to I schedule a follow-up visit?  If you did not schedule a follow-up visit today, please call [phone] to schedule a follow-up office visit.        Sincerely,    Miguel Turner PA-C  [facility]  Division of Gastroenterology

## 2020-06-22 NOTE — LETTER
6/22/2020         RE: Kyle Coelho  [address]  [facility] [address]        Dear Colleague,    Thank you for referring your patient, Kyle Coelho, to the [facility]STRO[facility]OLOG[facility] IBD [facility]. Please see a copy of my visit note below.    Kyle Coelho is a 30 year old male who is being evaluated via a billable video visit.        Video-Visit Details    Type of service:  Video Visit    Video Start Time: 0754  Video End Time: 8:16 AM    Originating Location (pt. Location): Home    Distant Location (provider location):  [facility]     Platform used for Video Visit: Salvador Turner PA-C    IBD CLINIC VISIT     CC/REFERRING MD:  Self referred  REASON FOR FOLLOW UP: Ulcerative colitis    IBD HISTORY  Age at diagnosis: 26, 2017  Extent of disease: Pancolitis  Current UC medications:   -- Adalimumab (started Nov 2017) every 14 days  Prior UC surgeries: None  Prior IBD Medications:   -- Lialda - flu like illness with fevers, abdominal pains  -- Apriso - flu like illlness  -- Sulfasalazine - did not control symptoms    DRUG MONITORING  TPMT enzyme activity: --    6-TGN/6-MMPN levels: --    Biologic concentration:  6/12/18 ADA level 12.4, no antibodies (every 14 days, monotherapy)    DISEASE ASSESSMENT  Labs:  CRP, ESR Results  Recent Labs   Lab Test 12/03/18  0927 06/12/18  0946   CRP <2.9 <2.9   SED 4 3     Endoscopic assessment: Flexible sigmoidoscopy 2/5/2018 shows Maddox score 0, normal or inactive disease.  Enterography: --  Fecal calprotectin: --   C diff: Negative 10/2017  pMayo score: 0/9    IBDQ Score Date IBDQ - Total Score  (Higher score better)   5/6/2019 63   12/3/2018 63       ASSESSMENT/PLAN  This is a 30 year old male with a history of ulcerative pancolitis who was recently started on adalimumab therapy.      1.  Ulcerative pancolitis: Continues in steroid free clinical and endoscopic remission on adalimumab monotherapy.  Is doing extremely well  and we should work to maintain his response. Adalimumab level obtained in 2018 is within range with level 12.4, on monotherapy every 14 days.  -- Continue adalimumab every 14 days  -- Blood work every 3-4 months on adalimumab: CBC, LFTs, CRP, ESR    2.  Joint pain: substantially improved and likely related to his underlying IBD.      IBD healthcare maintenance based on patients current medication:  Anti-TNF medication therapy vs Anti-integrin therapy      Vaccinations:  -- Influenza (every year): Fall 2019  -- TdaP (every 10 years): 2015  -- Pneumococcal Pneumonia (once then every 5 years): recommend  -- Yearly assessment for latent Tb (verbal screening and exam, PPD or QuantiFERON-Tb testing): negative 3/2020     One time confirmation of immunity or serologies:  -- Hepatitis A (serologies or immunizations): Hep A Ab, Total was nonreactive ([facility])  -- Hepatitis B (serologies or immunizations): immune by serologies ([facility])  -- Varicella: Had chickenpox as a child  -- MMR: Not documented  -- HPV (all aged 18-26): Not documented  -- Meningococcal meningitis (all patients at risk for meningitis): 2005   -- Due to the immunosuppression in this patient, I would not advise administration of live vaccines such as varicella/VZV, intranasal influenza, MMR, or yellow fever vaccine (if travelling).      Bone mineral density screening   -- Recommend all patients supplement with calcium and vitamin D  -- Given prior steroid use recommend DEXA if not already done    Cancer Screening:  Colon cancer screening:  Given pancolitis colonoscopy every 1-3 years recommended after 8 years of disease.  Dysplasia screening is recommended 2024.    Cervical cancer screening: N/A    Skin cancer screening: Annual visual exam of skin by dermatologist since patient is immunocompromised    Depression Screening:  -- Over the last month, have you felt down, depressed, or hopeless? No  -- Over the last month, have you felt little interest or pleasure  doing things? No    Misc:  -- Avoid tobacco use  -- Avoid NSAIDs as there is potentially a 25% chance of causing an IBD flare    RTC 6 months    Thank you for this consultation.  It was a pleasure to participate in the care of this patient; please contact us with any further questions.      Miguel Turner PA-C  Division of Gastroenterology, Hepatology and Nutrition  [facility]     HPI:   Maddox score:   Stool freq: 0 (baseline stools frequency)  Rectal bleedin (None)  PGA: 0 (normal)  Endoscopy: Not done    Remission: <3   Mild disease: 3-5  Moderate disease: 6-10  Severe disease: >10    1-2 stools a day, which is his baseline.  No abdominal pain, nausea or emesis.  No urgency or nocturnal stools. No blood in the stool.    Headaches once a month.  Lasts for about a day, then self resolves.  Potentially more on the side - over temple.  No other symptoms with headache - vision changes or nausea.  Does not take anything for headaches.      Has an 11 month old at home. Loves being a Dad.    ROS:    No fevers or chills  No weight loss  No blurry vision, double vision or change in vision  No sore throat  No lymphadenopathy  No new symptoms for headache (gets HA twice per month, eventually will resolve w/in 24 hours)  No paraesthesias, or weakness in a limb  No shortness of breath or wheezing  No chest pain or pressure  No arthralgias or myalgias  No rashes or skin changes  No odynophagia or dysphagia  No BRBPR, hematochezia, melena  No dysuria, frequency or urgency  No hot/cold intolerance or polyria  No anxiety or depression    Extra intestinal manifestations of IBD:  No uveitis/episcleritis  No aphthous ulcers   No arthritis   No erythema nodosum/pyoderma gangrenosum.     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     Flex sig 2/5/18: eMayo 0    ALLERGIES:   No Known Allergies    PERTINENT MEDICATIONS:    Current Outpatient Medications:      adalimumab (HUMIRA *CF* PEN) 40 MG/0.4ML pen kit, Inject 0.4 mLs (40 mg) Subcutaneous every 14 days, Disp: 6 each, Rfl: 1    SOCIAL HISTORY:  Social History     Socioeconomic History     Marital status: Single     Spouse name: Not on file     Number of children: Not on file     Years of education: Not on file     Highest education level: Not on file   Occupational History     Not on file   Social Needs     Financial resource strain: Not on file     Food insecurity     Worry: Not on file     Inability: Not on file     Transportation needs     Medical: Not on file     Non-medical: Not on file   Tobacco Use     Smoking status: Never Smoker     Smokeless tobacco: Never Used   Substance and Sexual Activity     Alcohol use: No     Drug use: No     Sexual activity: Never   Lifestyle     Physical activity     Days per week: Not on file     Minutes per session: Not on file     Stress: Not on file   Relationships     Social connections     Talks on phone: Not on file     Gets together: Not on file     Attends Adventism service: Not on file     Active member of club or organization: Not on file     Attends meetings of clubs or organizations: Not on file     Relationship status: Not on file     Intimate partner violence     Fear of current or ex partner: Not on file     Emotionally abused: Not on file     Physically abused: Not on file     Forced sexual activity: Not on file   Other Topics Concern     Parent/sibling w/ CABG, MI or angioplasty before 65F 55M? Not Asked   Social History Narrative     Not on file     FAMILY HISTORY:  No CRC, no IBD  Past/family/social history reviewed and no changes    PHYSICAL EXAMINATION:  Constitutional: aaox3, cooperative, pleasant, not dyspneic/diaphoretic, no acute  distress  Vitals reviewed: There were no vitals taken for this visit.  Wt:   Wt Readings from Last 2 Encounters:   11/18/19 94.1 kg (207 lb 8 oz)   05/07/19 97.5 kg (215 lb)      General appearance:  Healthy appearing adult, in no acute distress  Eyes: Sclera anicteric, Pupils round and reactive to light  Ears, nose, mouth and throat: No obvious external lesions of ears and nose, Hearing intact  Neck: Symmetric, No obvious external lesions  Respiratory: Normal respiration, no use of accessory muscles   Skin: No rashes or jaundice   Psychiatric: Oriented to person, place and time, Appropriate mood and affect.     PERTINENT STUDIES:  Most recent CBC:  Recent Labs   Lab Test 10/04/19  1155 05/31/19  1323   WBC 5.5 5.7   HGB 15.0 14.2   HCT 44.3 41.3    220     Most recent hepatic panel:  Recent Labs   Lab Test 10/04/19  1155 05/31/19  1323   ALT 27 23   AST 20 19     Most recent creatinine:  Recent Labs   Lab Test 03/12/17  1418   CR 0.99       Again, thank you for allowing me to participate in the care of your patient.        Sincerely,        Miguel Turner PA-C

## 2020-06-22 NOTE — NURSING NOTE
Chief Complaint   Patient presents with     RECHECK     follow up       There were no vitals filed for this visit.    There is no height or weight on file to calculate BMI.    Angela Solomon, CMA

## 2020-06-22 NOTE — PROGRESS NOTES
"Kyle Coelho is a 30 year old male who is being evaluated via a billable video visit.      The patient has been notified of following:     \"This video visit will be conducted via a call between you and your physician/provider. We have found that certain health care needs can be provided without the need for an in-person physical exam.  This service lets us provide the care you need with a video conversation.  If a prescription is necessary we can send it directly to your pharmacy.  If lab work is needed we can place an order for that and you can then stop by our lab to have the test done at a later time.    Video visits are billed at different rates depending on your insurance coverage.  Please reach out to your insurance provider with any questions.    If during the course of the call the physician/provider feels a video visit is not appropriate, you will not be charged for this service.\"    Patient has given verbal consent for Video visit? Yes    Will anyone else be joining your video visit? No        Video-Visit Details    Type of service:  Video Visit    Video Start Time: 0754  Video End Time: 8:16 AM    Originating Location (pt. Location): Home    Distant Location (provider location):  [facility]OLOG[facility]     Platform used for Video Visit: Salvador Turner PA-C    IBD CLINIC VISIT     CC/REFERRING MD:  Self referred  REASON FOR FOLLOW UP: Ulcerative colitis    IBD HISTORY  Age at diagnosis: 26, 2017  Extent of disease: Pancolitis  Current UC medications:   -- Adalimumab (started Nov 2017) every 14 days  Prior UC surgeries: None  Prior IBD Medications:   -- Lialda - flu like illness with fevers, abdominal pains  -- Apriso - flu like illlness  -- Sulfasalazine - did not control symptoms    DRUG MONITORING  TPMT enzyme activity: --    6-TGN/6-MMPN levels: --    Biologic concentration:  6/12/18 ADA level 12.4, no antibodies (every 14 days, monotherapy)    DISEASE " ASSESSMENT  Labs:  CRP, ESR Results  Recent Labs   Lab Test 12/03/18  0927 06/12/18  0946   CRP <2.9 <2.9   SED 4 3     Endoscopic assessment: Flexible sigmoidoscopy 2/5/2018 shows Maddox score 0, normal or inactive disease.  Enterography: --  Fecal calprotectin: --   C diff: Negative 10/2017  pMayo score: 0/9    IBDQ Score Date IBDQ - Total Score  (Higher score better)   5/6/2019 63   12/3/2018 63       ASSESSMENT/PLAN  This is a 30 year old male with a history of ulcerative pancolitis who was recently started on adalimumab therapy.      1.  Ulcerative pancolitis: Continues in steroid free clinical and endoscopic remission on adalimumab monotherapy.  Is doing extremely well and we should work to maintain his response. Adalimumab level obtained in 2018 is within range with level 12.4, on monotherapy every 14 days.  -- Continue adalimumab every 14 days  -- Blood work every 3-4 months on adalimumab: CBC, LFTs, CRP, ESR    2.  Joint pain: substantially improved and likely related to his underlying IBD.      IBD healthcare maintenance based on patients current medication:  Anti-TNF medication therapy vs Anti-integrin therapy      Vaccinations:  -- Influenza (every year): Fall 2019  -- TdaP (every 10 years): 2015  -- Pneumococcal Pneumonia (once then every 5 years): recommend  -- Yearly assessment for latent Tb (verbal screening and exam, PPD or QuantiFERON-Tb testing): negative 3/2020     One time confirmation of immunity or serologies:  -- Hepatitis A (serologies or immunizations): Hep A Ab, Total was nonreactive ([facility])  -- Hepatitis B (serologies or immunizations): immune by serologies ([facility])  -- Varicella: Had chickenpox as a child  -- MMR: Not documented  -- HPV (all aged 18-26): Not documented  -- Meningococcal meningitis (all patients at risk for meningitis): 2005   -- Due to the immunosuppression in this patient, I would not advise administration of live vaccines such as varicella/VZV, intranasal influenza, MMR, or  yellow fever vaccine (if travelling).      Bone mineral density screening   -- Recommend all patients supplement with calcium and vitamin D  -- Given prior steroid use recommend DEXA if not already done    Cancer Screening:  Colon cancer screening:  Given pancolitis colonoscopy every 1-3 years recommended after 8 years of disease.  Dysplasia screening is recommended .    Cervical cancer screening: N/A    Skin cancer screening: Annual visual exam of skin by dermatologist since patient is immunocompromised    Depression Screening:  -- Over the last month, have you felt down, depressed, or hopeless? No  -- Over the last month, have you felt little interest or pleasure doing things? No    Misc:  -- Avoid tobacco use  -- Avoid NSAIDs as there is potentially a 25% chance of causing an IBD flare    RTC 6 months    Thank you for this consultation.  It was a pleasure to participate in the care of this patient; please contact us with any further questions.      Miguel Turner PA-C  Division of Gastroenterology, Hepatology and Nutrition  [facility]     HPI:   Maddox score:   Stool freq: 0 (baseline stools frequency)  Rectal bleedin (None)  PGA: 0 (normal)  Endoscopy: Not done    Remission: <3   Mild disease: 3-5  Moderate disease: 6-10  Severe disease: >10    1-2 stools a day, which is his baseline.  No abdominal pain, nausea or emesis.  No urgency or nocturnal stools. No blood in the stool.    Headaches once a month.  Lasts for about a day, then self resolves.  Potentially more on the side - over temple.  No other symptoms with headache - vision changes or nausea.  Does not take anything for headaches.      Has an 11 month old at home. Loves being a Dad.    ROS:    No fevers or chills  No weight loss  No blurry vision, double vision or change in vision  No sore throat  No lymphadenopathy  No new symptoms for headache (gets HA twice per month, eventually will resolve w/in 24 hours)  No paraesthesias, or  weakness in a limb  No shortness of breath or wheezing  No chest pain or pressure  No arthralgias or myalgias  No rashes or skin changes  No odynophagia or dysphagia  No BRBPR, hematochezia, melena  No dysuria, frequency or urgency  No hot/cold intolerance or polyria  No anxiety or depression    Extra intestinal manifestations of IBD:  No uveitis/episcleritis  No aphthous ulcers   No arthritis   No erythema nodosum/pyoderma gangrenosum.     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     Flex sig 2/5/18: eMayo 0    ALLERGIES:   No Known Allergies    PERTINENT MEDICATIONS:    Current Outpatient Medications:      adalimumab (HUMIRA *CF* PEN) 40 MG/0.4ML pen kit, Inject 0.4 mLs (40 mg) Subcutaneous every 14 days, Disp: 6 each, Rfl: 1    SOCIAL HISTORY:  Social History     Socioeconomic History     Marital status: Single     Spouse name: Not on file     Number of children: Not on file     Years of education: Not on file     Highest education level: Not on file   Occupational History     Not on file   Social Needs     Financial resource strain: Not on file     Food insecurity     Worry: Not on file     Inability: Not on file     Transportation needs     Medical: Not on file     Non-medical: Not on file   Tobacco Use     Smoking status: Never Smoker     Smokeless tobacco: Never Used   Substance and Sexual Activity     Alcohol use: No     Drug use: No     Sexual activity: Never   Lifestyle     Physical activity     Days per week: Not on file     Minutes per session: Not on file     Stress: Not on file   Relationships     Social connections     Talks on phone: Not on file     Gets together: Not on file     Attends Congregation service: Not on  file     Active member of club or organization: Not on file     Attends meetings of clubs or organizations: Not on file     Relationship status: Not on file     Intimate partner violence     Fear of current or ex partner: Not on file     Emotionally abused: Not on file     Physically abused: Not on file     Forced sexual activity: Not on file   Other Topics Concern     Parent/sibling w/ CABG, MI or angioplasty before 65F 55M? Not Asked   Social History Narrative     Not on file     FAMILY HISTORY:  No CRC, no IBD  Past/family/social history reviewed and no changes    PHYSICAL EXAMINATION:  Constitutional: aaox3, cooperative, pleasant, not dyspneic/diaphoretic, no acute distress  Vitals reviewed: There were no vitals taken for this visit.  Wt:   Wt Readings from Last 2 Encounters:   11/18/19 94.1 kg (207 lb 8 oz)   05/07/19 97.5 kg (215 lb)      General appearance:  Healthy appearing adult, in no acute distress  Eyes: Sclera anicteric, Pupils round and reactive to light  Ears, nose, mouth and throat: No obvious external lesions of ears and nose, Hearing intact  Neck: Symmetric, No obvious external lesions  Respiratory: Normal respiration, no use of accessory muscles   Skin: No rashes or jaundice   Psychiatric: Oriented to person, place and time, Appropriate mood and affect.     PERTINENT STUDIES:  Most recent CBC:  Recent Labs   Lab Test 10/04/19  1155 05/31/19  1323   WBC 5.5 5.7   HGB 15.0 14.2   HCT 44.3 41.3    220     Most recent hepatic panel:  Recent Labs   Lab Test 10/04/19  1155 05/31/19  1323   ALT 27 23   AST 20 19     Most recent creatinine:  Recent Labs   Lab Test 03/12/17  1418   CR 0.99

## 2020-08-06 DIAGNOSIS — K51.00 ULCERATIVE PANCOLITIS WITHOUT COMPLICATION (H): ICD-10-CM

## 2020-08-06 LAB
ALBUMIN SERPL-MCNC: 4.3 G/DL (ref 3.4–5)
ALP SERPL-CCNC: 54 U/L (ref 40–150)
ALT SERPL W P-5'-P-CCNC: 26 U/L (ref 0–70)
AST SERPL W P-5'-P-CCNC: 19 U/L (ref 0–45)
BASOPHILS # BLD AUTO: 0.1 10E9/L (ref 0–0.2)
BASOPHILS NFR BLD AUTO: 0.8 %
BILIRUB DIRECT SERPL-MCNC: <0.1 MG/DL (ref 0–0.2)
BILIRUB SERPL-MCNC: 0.4 MG/DL (ref 0.2–1.3)
CRP SERPL-MCNC: <2.9 MG/L (ref 0–8)
DIFFERENTIAL METHOD BLD: NORMAL
EOSINOPHIL # BLD AUTO: 0.2 10E9/L (ref 0–0.7)
EOSINOPHIL NFR BLD AUTO: 2.3 %
ERYTHROCYTE [DISTWIDTH] IN BLOOD BY AUTOMATED COUNT: 11.9 % (ref 10–15)
ERYTHROCYTE [SEDIMENTATION RATE] IN BLOOD BY WESTERGREN METHOD: 3 MM/H (ref 0–15)
HCT VFR BLD AUTO: 43.8 % (ref 40–53)
HGB BLD-MCNC: 15.1 G/DL (ref 13.3–17.7)
IMM GRANULOCYTES # BLD: 0 10E9/L (ref 0–0.4)
IMM GRANULOCYTES NFR BLD: 0.2 %
LYMPHOCYTES # BLD AUTO: 2.5 10E9/L (ref 0.8–5.3)
LYMPHOCYTES NFR BLD AUTO: 38.3 %
MCH RBC QN AUTO: 29.7 PG (ref 26.5–33)
MCHC RBC AUTO-ENTMCNC: 34.5 G/DL (ref 31.5–36.5)
MCV RBC AUTO: 86 FL (ref 78–100)
MONOCYTES # BLD AUTO: 0.4 10E9/L (ref 0–1.3)
MONOCYTES NFR BLD AUTO: 5.8 %
NEUTROPHILS # BLD AUTO: 3.5 10E9/L (ref 1.6–8.3)
NEUTROPHILS NFR BLD AUTO: 52.6 %
PLATELET # BLD AUTO: 216 10E9/L (ref 150–450)
PROT SERPL-MCNC: 7.9 G/DL (ref 6.8–8.8)
RBC # BLD AUTO: 5.08 10E12/L (ref 4.4–5.9)
WBC # BLD AUTO: 6.6 10E9/L (ref 4–11)

## 2020-08-06 PROCEDURE — 85025 COMPLETE CBC W/AUTO DIFF WBC: CPT | Performed by: PHYSICIAN ASSISTANT

## 2020-08-06 PROCEDURE — 36415 COLL VENOUS BLD VENIPUNCTURE: CPT | Performed by: PHYSICIAN ASSISTANT

## 2020-08-06 PROCEDURE — 85652 RBC SED RATE AUTOMATED: CPT | Performed by: PHYSICIAN ASSISTANT

## 2020-08-06 PROCEDURE — 86140 C-REACTIVE PROTEIN: CPT | Performed by: PHYSICIAN ASSISTANT

## 2020-08-06 PROCEDURE — 80076 HEPATIC FUNCTION PANEL: CPT | Performed by: PHYSICIAN ASSISTANT

## 2020-11-29 ENCOUNTER — HEALTH MAINTENANCE LETTER (OUTPATIENT)
Age: 30
End: 2020-11-29

## 2020-12-22 ENCOUNTER — PATIENT OUTREACH (OUTPATIENT)
Dept: GASTROENTEROLOGY | Facility: CLINIC | Age: 30
End: 2020-12-22

## 2020-12-22 DIAGNOSIS — K51.00 ULCERATIVE PANCOLITIS WITHOUT COMPLICATION (H): ICD-10-CM

## 2020-12-22 RX ORDER — ADALIMUMAB 40MG/0.4ML
40 KIT SUBCUTANEOUS
Qty: 2 EACH | Refills: 5 | Status: SHIPPED | OUTPATIENT
Start: 2020-12-22 | End: 2021-06-15

## 2020-12-22 NOTE — PROGRESS NOTES
Patient needs refill for humira  Needs follow up clinic appointment and monitoring labs  My chart to complete  In basket to prior team to release humira prescription.

## 2021-02-26 DIAGNOSIS — K51.00 ULCERATIVE PANCOLITIS WITHOUT COMPLICATION (H): ICD-10-CM

## 2021-02-26 LAB
ALBUMIN SERPL-MCNC: 4.4 G/DL (ref 3.4–5)
ALP SERPL-CCNC: 48 U/L (ref 40–150)
ALT SERPL W P-5'-P-CCNC: 78 U/L (ref 0–70)
AST SERPL W P-5'-P-CCNC: 215 U/L (ref 0–45)
BASOPHILS # BLD AUTO: 0 10E9/L (ref 0–0.2)
BASOPHILS NFR BLD AUTO: 0.6 %
BILIRUB DIRECT SERPL-MCNC: 0.1 MG/DL (ref 0–0.2)
BILIRUB SERPL-MCNC: 0.4 MG/DL (ref 0.2–1.3)
CRP SERPL-MCNC: <2.9 MG/L (ref 0–8)
DIFFERENTIAL METHOD BLD: NORMAL
EOSINOPHIL # BLD AUTO: 0.2 10E9/L (ref 0–0.7)
EOSINOPHIL NFR BLD AUTO: 3.1 %
ERYTHROCYTE [DISTWIDTH] IN BLOOD BY AUTOMATED COUNT: 11.8 % (ref 10–15)
ERYTHROCYTE [SEDIMENTATION RATE] IN BLOOD BY WESTERGREN METHOD: 5 MM/H (ref 0–15)
HCT VFR BLD AUTO: 42.9 % (ref 40–53)
HGB BLD-MCNC: 14.9 G/DL (ref 13.3–17.7)
IMM GRANULOCYTES # BLD: 0 10E9/L (ref 0–0.4)
IMM GRANULOCYTES NFR BLD: 0.2 %
LYMPHOCYTES # BLD AUTO: 2.7 10E9/L (ref 0.8–5.3)
LYMPHOCYTES NFR BLD AUTO: 42 %
MCH RBC QN AUTO: 29.6 PG (ref 26.5–33)
MCHC RBC AUTO-ENTMCNC: 34.7 G/DL (ref 31.5–36.5)
MCV RBC AUTO: 85 FL (ref 78–100)
MONOCYTES # BLD AUTO: 0.4 10E9/L (ref 0–1.3)
MONOCYTES NFR BLD AUTO: 5.8 %
NEUTROPHILS # BLD AUTO: 3.1 10E9/L (ref 1.6–8.3)
NEUTROPHILS NFR BLD AUTO: 48.3 %
PLATELET # BLD AUTO: 212 10E9/L (ref 150–450)
PROT SERPL-MCNC: 8.1 G/DL (ref 6.8–8.8)
RBC # BLD AUTO: 5.04 10E12/L (ref 4.4–5.9)
WBC # BLD AUTO: 6.4 10E9/L (ref 4–11)

## 2021-02-26 PROCEDURE — 80076 HEPATIC FUNCTION PANEL: CPT | Performed by: INTERNAL MEDICINE

## 2021-02-26 PROCEDURE — 86140 C-REACTIVE PROTEIN: CPT | Performed by: INTERNAL MEDICINE

## 2021-02-26 PROCEDURE — 85652 RBC SED RATE AUTOMATED: CPT | Performed by: INTERNAL MEDICINE

## 2021-02-26 PROCEDURE — 36415 COLL VENOUS BLD VENIPUNCTURE: CPT | Performed by: INTERNAL MEDICINE

## 2021-02-26 PROCEDURE — 85025 COMPLETE CBC W/AUTO DIFF WBC: CPT | Performed by: INTERNAL MEDICINE

## 2021-03-01 ENCOUNTER — VIRTUAL VISIT (OUTPATIENT)
Dept: GASTROENTEROLOGY | Facility: CLINIC | Age: 31
End: 2021-03-01
Payer: COMMERCIAL

## 2021-03-01 VITALS — HEIGHT: 73 IN | BODY MASS INDEX: 27.83 KG/M2 | WEIGHT: 210 LBS

## 2021-03-01 DIAGNOSIS — K51.00 ULCERATIVE PANCOLITIS WITHOUT COMPLICATION (H): Primary | ICD-10-CM

## 2021-03-01 DIAGNOSIS — R74.8 ELEVATED LIVER ENZYMES: Primary | ICD-10-CM

## 2021-03-01 PROCEDURE — 99213 OFFICE O/P EST LOW 20 MIN: CPT | Mod: 95 | Performed by: INTERNAL MEDICINE

## 2021-03-01 ASSESSMENT — MIFFLIN-ST. JEOR: SCORE: 1976.43

## 2021-03-01 NOTE — PROGRESS NOTES
IBD CLINIC VISIT     CC/REFERRING MD:  Self referred  REASON FOR FOLLOW UP: Ulcerative colitis    ASSESSMENT/PLAN  30 year old male with a history of ulcerative pancolitis.     1.  Ulcerative pancolitis:   Current Medications  - Adalimumab (Nob 2017) q14 days  Current Clinical Disease Activity: Remission (Maddox score 0)  Last Endoscopic Disease Activity: eMayo 0 in 2/2018    He remains in clinical remission on adalimumab monotherapy every 2 weeks. Plan to continue for maintenance of remission.     -- Continue adalimumab every 14 days  -- Blood work every 6 months on adalimumab: CBC, LFTs, CRP, ESR    2.  Joint pain: Resolved on Humira    3. Elevated liver tests: Suspect related to vigorous excessive. Will repeat in 1 month.    IBD dysplasia surveillance: Given pancolitis colonoscopy every 1-3 years recommended after 8 years of disease.  Dysplasia screening is recommended 2024.    Misc:  -- Avoid tobacco use  -- Avoid NSAIDs as there is potentially a 25% chance of causing an IBD flare    RTC 6 months    This visit was conducted via telephone call. Total time on telephone was: 17 minutes     Gordo Escobedo MD    [facility]  Inflammatory Bowel Disease Program  Division of Gastroenterology, Hepatology and Nutrition  Pager: [phone]    IBD HISTORY  Age at diagnosis: 26, 2017  Extent of disease: Pancolitis  Prior UC surgeries: None  Prior IBD Medications:   -- Lialda - flu like illness with fevers, abdominal pains  -- Apriso - flu like illlness  -- Sulfasalazine - did not control symptoms    DRUG MONITORING  TPMT enzyme activity: --    6-TGN/6-MMPN levels: --    Biologic concentration:  6/12/18 ADA level 12.4, no antibodies (every 14 days, monotherapy)    IBDQ Score Date IBDQ - Total Score  (Higher score better)   5/6/2019 63   12/3/2018 63        HPI:   Here today for a follow-up visit. He and his wife are expecting another boy.     He notes that he had not been to the gym in a year, then he  started going again about one and a half weeks ago, and went a little too hard. He actually could not even straighten his arms because they were so sore. After that his ALT/AST was elevated.     He also has some questions about the coronavirus vaccine, mainly about any interactions with his disease and medications.     Otherwise reports 1-2 stools a day. He notes he has put on some weight over last year. Does notice a little more sense of being drained or exhausted, potentially after the Humira.     Maddox score:   Stool freq: 0 (baseline stools frequency)  Rectal bleedin (None)  PGA: 0 (normal)  Endoscopy: Not done in past three months.     Extra intestinal manifestations of IBD:  No uveitis/episcleritis  No aphthous ulcers   No arthritis   No erythema nodosum/pyoderma gangrenosum.     ROS:    Constitutional, HEENT, cardiovascular, pulmonary, GI, , musculoskeletal, neuro, skin, endocrine and psych systems are negative, except as otherwise noted.     PERTINENT PAST MEDICAL HISTORY:  See HPI    PREVIOUS SURGERIES:  UC    PREVIOUS ENDOSCOPIES:   Colonoscopy on 2017 continuous erythema in entire colon and patchy inflammation within the ileum.Random colon biopsies showed moderate active chronic colitis consistent with inflammatory bowel disease, likely backwash ileitis.     Flex sig 18: eMayo 0    ALLERGIES:   No Known Allergies    PERTINENT MEDICATIONS:    Current Outpatient Medications:      adalimumab (HUMIRA *CF* PEN) 40 MG/0.4ML pen kit, Inject 0.4 mLs (40 mg) Subcutaneous every 14 days, Disp: 2 each, Rfl: 5    SOCIAL HISTORY:  Social History     Socioeconomic History     Marital status: Single     Spouse name: Not on file     Number of children: Not on file     Years of education: Not on file     Highest education level: Not on file   Occupational History     Not on file   Social Needs     Financial resource strain: Not on file     Food insecurity     Worry: Not on file     Inability: Not on file      "Transportation needs     Medical: Not on file     Non-medical: Not on file   Tobacco Use     Smoking status: Never Smoker     Smokeless tobacco: Never Used   Substance and Sexual Activity     Alcohol use: No     Drug use: No     Sexual activity: Never   Lifestyle     Physical activity     Days per week: Not on file     Minutes per session: Not on file     Stress: Not on file   Relationships     Social connections     Talks on phone: Not on file     Gets together: Not on file     Attends [employer] service: Not on file     Active member of club or organization: Not on file     Attends meetings of clubs or organizations: Not on file     Relationship status: Not on file     Intimate partner violence     Fear of current or ex partner: Not on file     Emotionally abused: Not on file     Physically abused: Not on file     Forced sexual activity: Not on file   Other Topics Concern     Parent/sibling w/ CABG, MI or angioplasty before 65F 55M? Not Asked   Social History Narrative     Not on file     FAMILY HISTORY:  No CRC, no IBD  Past/family/social history reviewed and no changes    PHYSICAL EXAMINATION:  Constitutional: aaox3, cooperative, pleasant, not dyspneic/diaphoretic, no acute distress  Vitals reviewed: Ht 1.854 m (6' 1\")   Wt 95.3 kg (210 lb)   BMI 27.71 kg/m    Wt:   Wt Readings from Last 2 Encounters:   03/01/21 95.3 kg (210 lb)   11/18/19 94.1 kg (207 lb 8 oz)      General appearance:  Healthy appearing adult, in no acute distress  Eyes: Sclera anicteric, Pupils round and reactive to light  Ears, nose, mouth and throat: No obvious external lesions of ears and nose, Hearing intact  Neck: Symmetric, No obvious external lesions  Respiratory: Normal respiration, no use of accessory muscles   Skin: No rashes or jaundice   Psychiatric: Oriented to person, place and time, Appropriate mood and affect.     "

## 2021-03-01 NOTE — NURSING NOTE
"Chief Complaint   Patient presents with     Follow Up       Vitals:    03/01/21 1448   Weight: 95.3 kg (210 lb)   Height: 1.854 m (6' 1\")       Body mass index is 27.71 kg/m .    Angela Solomon CMA    "

## 2021-03-01 NOTE — PATIENT INSTRUCTIONS
-- Repeat liver tests in 1 month  -- Moderate your exercise and make sure you are drinking plenty of fluids with exercise.     We have a study for people on IBD medications who are getting the covid vaccine. If you are interested, you can reach out to [email or web address] to learn more. I will also pass on your name to the study team.     Follow-up in 6 months    If you have any questions, please don't hesitate to contact the Gastroenterology nurse at [phone].     -Dr. Escobedo

## 2021-03-01 NOTE — LETTER
3/1/2021         RE: Kyle Coelho  [id number] [address] [address]        Dear Colleague,    Thank you for referring your patient, Kyle Coelho, to the [facility] [place]. Please see a copy of my visit note below.    IBD CLINIC VISIT     CC/REFERRING MD:  Self referred  REASON FOR FOLLOW UP: Ulcerative colitis    ASSESSMENT/PLAN  30 year old male with a history of ulcerative pancolitis.     1.  Ulcerative pancolitis:   Current Medications  - Adalimumab (Nob 2017) q14 days  Current Clinical Disease Activity: Remission (Maddox score 0)  Last Endoscopic Disease Activity: eMayo 0 in 2/2018    He remains in clinical remission on adalimumab monotherapy every 2 weeks. Plan to continue for maintenance of remission.     -- Continue adalimumab every 14 days  -- Blood work every 6 months on adalimumab: CBC, LFTs, CRP, ESR    2.  Joint pain: Resolved on Humira    3. Elevated liver tests: Suspect related to vigorous excessive. Will repeat in 1 month.    IBD dysplasia surveillance: Given pancolitis colonoscopy every 1-3 years recommended after 8 years of disease.  Dysplasia screening is recommended 2024.    Misc:  -- Avoid tobacco use  -- Avoid NSAIDs as there is potentially a 25% chance of causing an IBD flare    RTC 6 months    This visit was conducted via telephone call. Total time on telephone was: 17 minutes     Gordo Escobedo MD    [facility]  Inflammatory Bowel Disease Program  Division of Gastroenterology, Hepatology and Nutrition  Pager: [phone]    IBD HISTORY  Age at diagnosis: 26, 2017  Extent of disease: Pancolitis  Prior UC surgeries: None  Prior IBD Medications:   -- Lialda - flu like illness with fevers, abdominal pains  -- Apriso - flu like illlness  -- Sulfasalazine - did not control symptoms    DRUG MONITORING  TPMT enzyme activity: --    6-TGN/6-MMPN levels: --    Biologic concentration:  6/12/18 ADA level 12.4, no antibodies (every 14  days, monotherapy)    IBDQ Score Date IBDQ - Total Score  (Higher score better)   2019 63   12/3/2018 63        HPI:   Here today for a follow-up visit. He and his wife are expecting another boy.     He notes that he had not been to the gym in a year, then he started going again about one and a half weeks ago, and went a little too hard. He actually could not even straighten his arms because they were so sore. After that his ALT/AST was elevated.     He also has some questions about the coronavirus vaccine, mainly about any interactions with his disease and medications.     Otherwise reports 1-2 stools a day. He notes he has put on some weight over last year. Does notice a little more sense of being drained or exhausted, potentially after the Humira.     Maddox score:   Stool freq: 0 (baseline stools frequency)  Rectal bleedin (None)  PGA: 0 (normal)  Endoscopy: Not done in past three months.     Extra intestinal manifestations of IBD:  No uveitis/episcleritis  No aphthous ulcers   No arthritis   No erythema nodosum/pyoderma gangrenosum.     ROS:    Constitutional, HEENT, cardiovascular, pulmonary, GI, , musculoskeletal, neuro, skin, endocrine and psych systems are negative, except as otherwise noted.     PERTINENT PAST MEDICAL HISTORY:  See HPI    PREVIOUS SURGERIES:  UC    PREVIOUS ENDOSCOPIES:   Colonoscopy on 2017 continuous erythema in entire colon and patchy inflammation within the ileum.Random colon biopsies showed moderate active chronic colitis consistent with inflammatory bowel disease, likely backwash ileitis.     Flex sig 18: eMayo 0    ALLERGIES:   No Known Allergies    PERTINENT MEDICATIONS:    Current Outpatient Medications:      adalimumab (HUMIRA *CF* PEN) 40 MG/0.4ML pen kit, Inject 0.4 mLs (40 mg) Subcutaneous every 14 days, Disp: 2 each, Rfl: 5    SOCIAL HISTORY:  Social History     Socioeconomic History     Marital status: Single     Spouse name: Not on file     Number of  "children: Not on file     Years of education: Not on file     Highest education level: Not on file   Occupational History     Not on file   Social Needs     Financial resource strain: Not on file     Food insecurity     Worry: Not on file     Inability: Not on file     Transportation needs     Medical: Not on file     Non-medical: Not on file   Tobacco Use     Smoking status: Never Smoker     Smokeless tobacco: Never Used   Substance and Sexual Activity     Alcohol use: No     Drug use: No     Sexual activity: Never   Lifestyle     Physical activity     Days per week: Not on file     Minutes per session: Not on file     Stress: Not on file   Relationships     Social connections     Talks on phone: Not on file     Gets together: Not on file     Attends Adventism service: Not on file     Active member of club or organization: Not on file     Attends meetings of clubs or organizations: Not on file     Relationship status: Not on file     Intimate partner violence     Fear of current or ex partner: Not on file     Emotionally abused: Not on file     Physically abused: Not on file     Forced sexual activity: Not on file   Other Topics Concern     Parent/sibling w/ CABG, MI or angioplasty before 65F 55M? Not Asked   Social History Narrative     Not on file     FAMILY HISTORY:  No CRC, no IBD  Past/family/social history reviewed and no changes    PHYSICAL EXAMINATION:  Constitutional: aaox3, cooperative, pleasant, not dyspneic/diaphoretic, no acute distress  Vitals reviewed: Ht 1.854 m (6' 1\")   Wt 95.3 kg (210 lb)   BMI 27.71 kg/m    Wt:   Wt Readings from Last 2 Encounters:   03/01/21 95.3 kg (210 lb)   11/18/19 94.1 kg (207 lb 8 oz)      General appearance:  Healthy appearing adult, in no acute distress  Eyes: Sclera anicteric, Pupils round and reactive to light  Ears, nose, mouth and throat: No obvious external lesions of ears and nose, Hearing intact  Neck: Symmetric, No obvious external lesions  Respiratory: Normal " respiration, no use of accessory muscles   Skin: No rashes or jaundice   Psychiatric: Oriented to person, place and time, Appropriate mood and affect.       Again, thank you for allowing me to participate in the care of your patient.        Sincerely,        Gordo Escobedo MD

## 2021-05-27 DIAGNOSIS — K51.00 ULCERATIVE PANCOLITIS WITHOUT COMPLICATION (H): ICD-10-CM

## 2021-05-30 NOTE — TELEPHONE ENCOUNTER
adalimumab (HUMIRA *CF* PEN) 40 MG/0.4ML pen kit     Last Written Prescription Date:  12/22/20  Last Fill Quantity: 2 ea,   # refills: 5  Last Office Visit :3/1/21  Future Office visit:  None    Lab Results   Component Value Date    WBC 6.4 02/26/2021     Lab Results   Component Value Date    RBC 5.04 02/26/2021     Lab Results   Component Value Date    HGB 14.9 02/26/2021     Lab Results   Component Value Date    HCT 42.9 02/26/2021     No components found for: MCT  Lab Results   Component Value Date    MCV 85 02/26/2021     Lab Results   Component Value Date    MCH 29.6 02/26/2021     Lab Results   Component Value Date    MCHC 34.7 02/26/2021     Lab Results   Component Value Date    RDW 11.8 02/26/2021     Lab Results   Component Value Date     02/26/2021     Lab Results   Component Value Date     02/26/2021     Lab Results   Component Value Date    ALT 78 02/26/2021     Routed because: labs  > 3 mths.

## 2021-06-15 ENCOUNTER — PATIENT OUTREACH (OUTPATIENT)
Dept: GASTROENTEROLOGY | Facility: CLINIC | Age: 31
End: 2021-06-15

## 2021-06-15 ENCOUNTER — TELEPHONE (OUTPATIENT)
Dept: GASTROENTEROLOGY | Facility: CLINIC | Age: 31
End: 2021-06-15

## 2021-06-15 DIAGNOSIS — K51.00 ULCERATIVE PANCOLITIS WITHOUT COMPLICATION (H): ICD-10-CM

## 2021-06-15 RX ORDER — ADALIMUMAB 40MG/0.4ML
40 KIT SUBCUTANEOUS
Qty: 2 EACH | Refills: 5 | Status: SHIPPED | OUTPATIENT
Start: 2021-06-15 | End: 2021-11-10

## 2021-06-15 NOTE — PROGRESS NOTES
Patient calls to request refill of humira  Pt seen in February and to repeat lft and due for quarterly labs of cbc, lft, esr, crp.   Humira refills  Pt will complete labs  MsLaurie Devine notified of humira refill  My chart to pt about labs and also follow up appt needs to be scheduled.

## 2021-06-15 NOTE — TELEPHONE ENCOUNTER
[facility] Call Center    Phone Message    May a detailed message be left on voicemail: yes     Reason for Call: Other: Carrie with [facility] is calling in today asking for a new Script for the patient's Humira. She states that they had put a request in previously and have yet to hear back. Please respond accordingly.     Action Taken: Message routed to:  [facility]    Travel Screening: Not Applicable

## 2021-07-15 RX ORDER — ADALIMUMAB 40MG/0.4ML
KIT SUBCUTANEOUS
Qty: 2 EACH | Refills: 4 | OUTPATIENT
Start: 2021-07-15

## 2021-09-03 ENCOUNTER — OFFICE VISIT (OUTPATIENT)
Dept: URGENT CARE | Facility: URGENT CARE | Age: 31
End: 2021-09-03
Payer: COMMERCIAL

## 2021-09-03 VITALS
SYSTOLIC BLOOD PRESSURE: 168 MMHG | HEART RATE: 73 BPM | TEMPERATURE: 97.6 F | OXYGEN SATURATION: 97 % | DIASTOLIC BLOOD PRESSURE: 92 MMHG

## 2021-09-03 DIAGNOSIS — M54.2 NECK PAIN: Primary | ICD-10-CM

## 2021-09-03 DIAGNOSIS — R10.84 ABDOMINAL PAIN, GENERALIZED: ICD-10-CM

## 2021-09-03 PROCEDURE — 99203 OFFICE O/P NEW LOW 30 MIN: CPT | Performed by: PHYSICIAN ASSISTANT

## 2021-09-03 ASSESSMENT — ENCOUNTER SYMPTOMS
BLOOD IN STOOL: 0
NAUSEA: 0
NECK PAIN: 1
ABDOMINAL PAIN: 1
VOMITING: 0
FEVER: 0
DIARRHEA: 0
APPETITE CHANGE: 0
DYSURIA: 0

## 2021-09-03 NOTE — PROGRESS NOTES
SUBJECTIVE:   Kyle Coelho is a 31 year old male presenting with a chief complaint of   Chief Complaint   Patient presents with     Abdominal Pain     Left side of the stomach, started late last night. Constant minor pain.      Neck Pain     Neck pain had throughout the day yesterday. Below the head. Gets sharp, stabbing pains when he takes a deep breath.        He is an established patient of [facility].  Patient presents with complaints of left side neck pain and stomach pain since yesterday.  Pain woke up from sleep.  Patient states when he takes a deep breath, both neck and stomach hurts.  No trauma.  Stabbing abdominal pain.  Comes and goes.  Pain is 4/10.  No radiation.  Nothing makes better.  ? Makes worse.  Normal BM earlier    Treatment: nothing  PMHx:  Ulcerative colitis  Medications:  humira [place]  Surgeries:  None  Social:  nonee          Review of Systems   Constitutional: Negative for appetite change and fever.   Gastrointestinal: Positive for abdominal pain. Negative for blood in stool, diarrhea, nausea and vomiting.   Genitourinary: Negative for dysuria.   Musculoskeletal: Positive for neck pain.   All other systems reviewed and are negative.      History reviewed. No pertinent past medical history.  Family History   Problem Relation Age of Onset     Melanoma No family hx of      Skin Cancer No family hx of      Current Outpatient Medications   Medication Sig Dispense Refill     adalimumab (HUMIRA *CF* PEN) 40 MG/0.4ML pen kit Inject 0.4 mLs (40 mg) Subcutaneous every 14 days 2 each 5     Social History     Tobacco Use     Smoking status: Never Smoker     Smokeless tobacco: Never Used   Substance Use Topics     Alcohol use: No       OBJECTIVE  BP (!) 168/92   Pulse 73   Temp 97.6  F (36.4  C) (Tympanic)   SpO2 97%     Physical Exam  Vitals and nursing note reviewed.   Constitutional:       Appearance: Normal appearance. He is normal weight.   Eyes:      Extraocular Movements: Extraocular  movements intact.      Conjunctiva/sclera: Conjunctivae normal.   Cardiovascular:      Rate and Rhythm: Normal rate and regular rhythm.      Pulses: Normal pulses.      Heart sounds: Normal heart sounds.   Pulmonary:      Effort: Pulmonary effort is normal.      Breath sounds: Normal breath sounds.   Abdominal:      General: Abdomen is flat. Bowel sounds are normal.      Palpations: Abdomen is soft.      Tenderness: There is abdominal tenderness.      Comments: Left mid abdominal pain with deep palpation.     Musculoskeletal:         General: Normal range of motion.      Cervical back: Normal range of motion and neck supple. No tenderness.   Skin:     General: Skin is warm and dry.   Neurological:      General: No focal deficit present.      Mental Status: He is alert.   Psychiatric:         Mood and Affect: Mood normal.         Behavior: Behavior normal.         Labs:  No results found for this or any previous visit (from the past 24 hour(s)).    X-Ray was not done.  EKG of 3/12/17 reviewed  ASSESSMENT:    No diagnosis found.     Medical Decision Making:    Differential Diagnosis:  Cervical strain, UC flare    Serious Comorbid Conditions:  Adult:  UC    PLAN:    Discussed reasons to seek immediate medical attention.  Additionally if no improvement or worsening in one week, may follow up with PCP and/or UC.  Recommended patient discuss with his GI physician.          Followup:    If not improving or if condition worsens, follow up with your Primary Care Provider, If not improving or if conditions worsens over the next 12-24 hours, go to the Emergency Department    There are no Patient Instructions on file for this visit.

## 2021-09-13 ENCOUNTER — LAB (OUTPATIENT)
Dept: LAB | Facility: CLINIC | Age: 31
End: 2021-09-13
Payer: COMMERCIAL

## 2021-09-13 DIAGNOSIS — K51.00 ULCERATIVE PANCOLITIS WITHOUT COMPLICATION (H): ICD-10-CM

## 2021-09-13 LAB
BASOPHILS # BLD AUTO: 0 10E3/UL (ref 0–0.2)
BASOPHILS NFR BLD AUTO: 0 %
CRP SERPL-MCNC: <2.9 MG/L (ref 0–8)
EOSINOPHIL # BLD AUTO: 0.2 10E3/UL (ref 0–0.7)
EOSINOPHIL NFR BLD AUTO: 2 %
ERYTHROCYTE [DISTWIDTH] IN BLOOD BY AUTOMATED COUNT: 12 % (ref 10–15)
ERYTHROCYTE [SEDIMENTATION RATE] IN BLOOD BY WESTERGREN METHOD: 6 MM/HR (ref 0–15)
HCT VFR BLD AUTO: 41.7 % (ref 40–53)
HGB BLD-MCNC: 14.4 G/DL (ref 13.3–17.7)
LYMPHOCYTES # BLD AUTO: 2.1 10E3/UL (ref 0.8–5.3)
LYMPHOCYTES NFR BLD AUTO: 31 %
MCH RBC QN AUTO: 29.9 PG (ref 26.5–33)
MCHC RBC AUTO-ENTMCNC: 34.5 G/DL (ref 31.5–36.5)
MCV RBC AUTO: 87 FL (ref 78–100)
MONOCYTES # BLD AUTO: 0.4 10E3/UL (ref 0–1.3)
MONOCYTES NFR BLD AUTO: 6 %
NEUTROPHILS # BLD AUTO: 4.1 10E3/UL (ref 1.6–8.3)
NEUTROPHILS NFR BLD AUTO: 60 %
PLATELET # BLD AUTO: 213 10E3/UL (ref 150–450)
RBC # BLD AUTO: 4.81 10E6/UL (ref 4.4–5.9)
WBC # BLD AUTO: 6.8 10E3/UL (ref 4–11)

## 2021-09-13 PROCEDURE — 36415 COLL VENOUS BLD VENIPUNCTURE: CPT

## 2021-09-13 PROCEDURE — 86140 C-REACTIVE PROTEIN: CPT

## 2021-09-13 PROCEDURE — 85025 COMPLETE CBC W/AUTO DIFF WBC: CPT

## 2021-09-13 PROCEDURE — 85652 RBC SED RATE AUTOMATED: CPT

## 2021-09-25 ENCOUNTER — HEALTH MAINTENANCE LETTER (OUTPATIENT)
Age: 31
End: 2021-09-25

## 2021-09-27 ENCOUNTER — VIRTUAL VISIT (OUTPATIENT)
Dept: GASTROENTEROLOGY | Facility: CLINIC | Age: 31
End: 2021-09-27
Payer: COMMERCIAL

## 2021-09-27 VITALS — WEIGHT: 225 LBS | BODY MASS INDEX: 29.82 KG/M2 | HEIGHT: 73 IN

## 2021-09-27 DIAGNOSIS — R74.8 ELEVATED LIVER ENZYMES: Primary | ICD-10-CM

## 2021-09-27 PROCEDURE — 99213 OFFICE O/P EST LOW 20 MIN: CPT | Mod: 95 | Performed by: INTERNAL MEDICINE

## 2021-09-27 ASSESSMENT — MIFFLIN-ST. JEOR: SCORE: 2039.47

## 2021-09-27 ASSESSMENT — PAIN SCALES - GENERAL: PAINLEVEL: NO PAIN (0)

## 2021-09-27 NOTE — PROGRESS NOTES
"Kyle Coelho is a 31 year old male who is being evaluated via a billable video visit.      The patient has been notified of following:     \"This video visit will be conducted via a call between you and your physician/provider. We have found that certain health care needs can be provided without the need for an in-person physical exam.  This service lets us provide the care you need with a video conversation.  If a prescription is necessary we can send it directly to your pharmacy.  If lab work is needed we can place an order for that and you can then stop by our lab to have the test done at a later time.    If during the course of the call the physician/provider feels a video visit is not appropriate, you will not be charged for this service.\"     Patient confirmed that they are in [place] for today's visit Yes    Video-Visit Details  Type of service:  Video Visit    Video Start Time: 4:20 PM  Video End Time:  4:38 PM    Originating Location (pt. Location): Home    Distant Location (provider location):  [facility]     Platform used: Salvador            "

## 2021-09-27 NOTE — PATIENT INSTRUCTIONS
It was a pleasure taking care of you today. I've included a brief summary of our discussion and care plan from today's visit below.  Please review this information with your primary care provider.  _______________________________________________________________________    My recommendations are summarized as follows:    Overall you are doing great! Plan to continue Humira for now.     -- Get liver tests at your convenience.    Return to GI Clinic in 6 months to review your progress.     If you need any follow-up appointments, please use the following phone numbers below.    To schedule or reschedule a follow-up GI appointment, call [phone] option 1    To schedule your endoscopy procedure, call [phone] option 2    To schedule imaging, please call [phone]     To schedule your lab appointment at [address] floor lab call [phone]. Call your [place] lab directly if it is not [facility]. If you use a non-[place] lab, please let us know where to fax your lab order (call Isabel at [phone] or Julia at [phone] for IBD patients.      _______________________________________________________________________    Please be in touch if there are any further questions that arise following today's visit.  There are multiple ways to contact your gastroenterology care team.      During business hours, you may reach your gastroenterology RN Care Coordinator. For IBD related questions, please call Julia at [phone]. For general GI questions, please call Isabel at [phone].      You can always send a secure message through Spreadsave. Spreadsave messages are answered by your nurse or doctor typically within 24 hours. Please allow extra time on weekends and holidays.     What is Spreadsave?  Spreadsave is a secure way for you to access all of your healthcare records from the [facility].  It is a web based computer program, so you can sign on to it from any location.  It  also allows you to send secure messages to your care team.  I recommend signing up for [employer] access if you have not already done so and are comfortable with using a computer.     For urgent/emergent questions after business hours, you may reach the on-call GI Fellow by contacting the [facility]  at [phone].     How will I get the results of any tests ordered?    You will receive all of your results.  If you have signed up for Genn[employer], any tests ordered at your visit will be available to you after your physician reviews them.  Typically this takes 1-2 weeks.  If there are urgent results that require a change in your care plan, your physician or nurse will call you to discuss the next steps.      Thank you for choosing [facility]!       Sincerely,    Gordo Escobedo MD  [facility]  Division of Gastroenterology

## 2021-09-27 NOTE — NURSING NOTE
"Chief Complaint   Patient presents with     Follow Up     ulcerative pancolitis.       Vitals:    09/27/21 1604   Weight: 102.1 kg (225 lb)   Height: 1.854 m (6' 1\")       Body mass index is 29.69 kg/m .      Ariadna Cochran MA        "

## 2021-09-27 NOTE — PROGRESS NOTES
IBD CLINIC VISIT     CC/REFERRING MD:  Self referred  REASON FOR FOLLOW UP: Ulcerative colitis    ASSESSMENT/PLAN  31 year old male with a history of ulcerative pancolitis.     1.  Ulcerative pancolitis:   Current Medications  - Adalimumab (2017) q14 days  Current Clinical Disease Activity: Remission (Maddox score 0)  Last Endoscopic Disease Activity: e[facility]o 0 in 2/2018    He remains in clinical remission on adalimumab monotherapy every 2 weeks. Plan to continue for maintenance of remission.     -- Continue adalimumab every 14 days  -- Blood work every 6 months on adalimumab: CBC, LFTs, CRP, ESR    2.  Joint pain: Resolved on Humira    3. Elevated liver tests: Suspect related to vigorous excessive. He forgot to get lab draw, will get now.   -- Repeat liver tests     IBD dysplasia surveillance: Given pancolitis colonoscopy every 1-3 years recommended after 8 years of disease.  Dysplasia screening is recommended 2024.    Misc:  -- Avoid tobacco use  -- Avoid NSAIDs as there is potentially a 25% chance of causing an IBD flare    RTC 6 months    Gordo Escobedo MD MS    [facility]  Inflammatory Bowel Disease Program  Division of Gastroenterology, Hepatology and Nutrition  Pager: [phone]    IBD HISTORY  Age at diagnosis: 26, 2017  Extent of disease: Pancolitis  Prior UC surgeries: None  Prior IBD Medications:   -- Lialda - flu like illness with fevers, abdominal pains  -- Apriso - flu like illlness  -- Sulfasalazine - did not control symptoms    DRUG MONITORING  TPMT enzyme activity: --    6-TGN/6-MMPN levels: --    Biologic concentration:  6/12/18 ADA level 12.4, no antibodies (every 14 days, monotherapy)    IBDQ Score Date IBDQ - Total Score  (Higher score better)   5/6/2019 63   12/3/2018 63        HPI:   Here today for follow-up. He is feeling great now, but had an odd experience three weeks ago. He notes that he went to urgent care because of neck pain and some upper abdomen pain. Labs were  better. Everything ended up getting better.     Otherwise stools have been normal. One stool a day after coffee. Formed, no blood. Rarely will have a few dietary triggers (milk) with more stool. Max three stools that are loose, still no blood. Resolves within a day.     He notes he will have a bad headache once every 3 weeks. Occurs bilaterally over temples. Then self resolves. He thinks it is sometimes related to Humira injections. No blurry vision, no nausea or vomiting.     Maddox score:   Stool freq: 0 (baseline stools frequency)  Rectal bleedin (None)  PGA: 0 (normal)  Endoscopy: Not done in past three months.     Extra intestinal manifestations of IBD:  No uveitis/episcleritis  No aphthous ulcers   No arthritis   No erythema nodosum/pyoderma gangrenosum.     ROS:    Constitutional, HEENT, cardiovascular, pulmonary, GI, , musculoskeletal, neuro, skin, endocrine and psych systems are negative, except as otherwise noted.     PERTINENT PAST MEDICAL HISTORY:  See HPI    PREVIOUS SURGERIES:    ALLERGIES:   No Known Allergies    PERTINENT MEDICATIONS:    Current Outpatient Medications:      adalimumab (HUMIRA *CF* PEN) 40 MG/0.4ML pen kit, Inject 0.4 mLs (40 mg) Subcutaneous every 14 days, Disp: 2 each, Rfl: 5    SOCIAL HISTORY:  Social History     Socioeconomic History     Marital status: Single     Spouse name: Not on file     Number of children: Not on file     Years of education: Not on file     Highest education level: Not on file   Occupational History     Not on file   Tobacco Use     Smoking status: Never Smoker     Smokeless tobacco: Never Used   Substance and Sexual Activity     Alcohol use: No     Drug use: No     Sexual activity: Never   Other Topics Concern     Parent/sibling w/ CABG, MI or angioplasty before 65F 55M? Not Asked   Social History Narrative     Not on file     Social Determinants of Health     Financial Resource Strain:      Difficulty of Paying Living Expenses:    Food Insecurity:       "Worried About Running Out of Food in the Last Year:      Ran Out of Food in the Last Year:    Transportation Needs:      Lack of Transportation (Medical):      Lack of Transportation (Non-Medical):    Physical Activity:      Days of Exercise per Week:      Minutes of Exercise per Session:    Stress:      Feeling of Stress :    Social Connections:      Frequency of Communication with Friends and Family:      Frequency of Social Gatherings with Friends and Family:      Attends Yarsani Services:      Active Member of Clubs or Organizations:      Attends Club or Organization Meetings:      Marital Status:    Intimate Partner Violence:      Fear of Current or Ex-Partner:      Emotionally Abused:      Physically Abused:      Sexually Abused:      FAMILY HISTORY:  No CRC, no IBD  Past/family/social history reviewed and no changes    PHYSICAL EXAMINATION:  Constitutional: aaox3, cooperative, pleasant, not dyspneic/diaphoretic, no acute distress  Vitals reviewed: Ht 1.854 m (6' 1\")   Wt 102.1 kg (225 lb)   BMI 29.69 kg/m    Wt:   Wt Readings from Last 2 Encounters:   09/27/21 102.1 kg (225 lb)   03/01/21 95.3 kg (210 lb)      General appearance:  Healthy appearing adult, in no acute distress  Eyes: Sclera anicteric, Pupils round and reactive to light  Ears, nose, mouth and throat: No obvious external lesions of ears and nose, Hearing intact  Neck: Symmetric, No obvious external lesions  Respiratory: Normal respiration, no use of accessory muscles   Skin: No rashes or jaundice   Psychiatric: Oriented to person, place and time, Appropriate mood and affect.     "

## 2021-11-08 DIAGNOSIS — K51.00 ULCERATIVE PANCOLITIS WITHOUT COMPLICATION (H): ICD-10-CM

## 2021-11-10 RX ORDER — ADALIMUMAB 40MG/0.4ML
KIT SUBCUTANEOUS
Qty: 2 EACH | Refills: 5 | Status: SHIPPED | OUTPATIENT
Start: 2021-11-10 | End: 2022-05-02

## 2021-11-10 NOTE — TELEPHONE ENCOUNTER
Last Clinic Visit:  9/27/21  NV: NONE    CBC RESULTS: Recent Labs   Lab Test 09/13/21  1542   WBC 6.8   RBC 4.81   HGB 14.4   HCT 41.7   MCV 87   MCH 29.9   MCHC 34.5   RDW 12.0        Liver Function Studies - Recent Labs   Lab Test 02/26/21  1434   PROTTOTAL 8.1   ALBUMIN 4.4   BILITOTAL 0.4   ALKPHOS 48   *   ALT 78*

## 2021-11-17 ENCOUNTER — TELEPHONE (OUTPATIENT)
Dept: GASTROENTEROLOGY | Facility: CLINIC | Age: 31
End: 2021-11-17
Payer: COMMERCIAL

## 2021-11-17 NOTE — TELEPHONE ENCOUNTER
GIA and sent long, pt to schedule f/u with Dr Escobedo or Miguel Turner sometime around March 2022.

## 2022-01-09 ENCOUNTER — HEALTH MAINTENANCE LETTER (OUTPATIENT)
Age: 32
End: 2022-01-09

## 2022-01-21 ENCOUNTER — LAB (OUTPATIENT)
Dept: LAB | Facility: CLINIC | Age: 32
End: 2022-01-21
Payer: COMMERCIAL

## 2022-01-21 DIAGNOSIS — R74.8 ELEVATED LIVER ENZYMES: ICD-10-CM

## 2022-01-21 LAB
ALBUMIN SERPL-MCNC: 4.3 G/DL (ref 3.4–5)
ALP SERPL-CCNC: 47 U/L (ref 40–150)
ALT SERPL W P-5'-P-CCNC: 30 U/L (ref 0–70)
AST SERPL W P-5'-P-CCNC: 19 U/L (ref 0–45)
BILIRUB DIRECT SERPL-MCNC: 0.1 MG/DL (ref 0–0.2)
BILIRUB SERPL-MCNC: 0.5 MG/DL (ref 0.2–1.3)
PROT SERPL-MCNC: 8.1 G/DL (ref 6.8–8.8)

## 2022-01-21 PROCEDURE — 80076 HEPATIC FUNCTION PANEL: CPT

## 2022-01-21 PROCEDURE — 36415 COLL VENOUS BLD VENIPUNCTURE: CPT

## 2022-02-01 ASSESSMENT — ENCOUNTER SYMPTOMS
HEARTBURN: 0
DIZZINESS: 0
DIARRHEA: 0
DYSURIA: 0
CONSTIPATION: 0
SHORTNESS OF BREATH: 0
COUGH: 0
WEAKNESS: 0
ABDOMINAL PAIN: 0
MYALGIAS: 0
FEVER: 0
ARTHRALGIAS: 0
CHILLS: 0
PARESTHESIAS: 0
FREQUENCY: 0
NERVOUS/ANXIOUS: 0
NAUSEA: 0
HEMATURIA: 0
PALPITATIONS: 0
EYE PAIN: 0
HEMATOCHEZIA: 0
HEADACHES: 0
SORE THROAT: 0
JOINT SWELLING: 0

## 2022-02-02 ENCOUNTER — OFFICE VISIT (OUTPATIENT)
Dept: FAMILY MEDICINE | Facility: CLINIC | Age: 32
End: 2022-02-02
Payer: COMMERCIAL

## 2022-02-02 VITALS
HEIGHT: 73 IN | DIASTOLIC BLOOD PRESSURE: 73 MMHG | OXYGEN SATURATION: 97 % | WEIGHT: 220 LBS | HEART RATE: 61 BPM | RESPIRATION RATE: 16 BRPM | TEMPERATURE: 97.1 F | BODY MASS INDEX: 29.16 KG/M2 | SYSTOLIC BLOOD PRESSURE: 126 MMHG

## 2022-02-02 DIAGNOSIS — D84.9 IMMUNOSUPPRESSION (H): ICD-10-CM

## 2022-02-02 DIAGNOSIS — H61.22 IMPACTED CERUMEN OF LEFT EAR: ICD-10-CM

## 2022-02-02 DIAGNOSIS — Z13.6 CARDIOVASCULAR SCREENING; LDL GOAL LESS THAN 160: ICD-10-CM

## 2022-02-02 DIAGNOSIS — K51.00 ULCERATIVE PANCOLITIS WITHOUT COMPLICATION (H): ICD-10-CM

## 2022-02-02 DIAGNOSIS — Z13.1 SCREENING FOR DIABETES MELLITUS: ICD-10-CM

## 2022-02-02 DIAGNOSIS — Z00.00 ROUTINE GENERAL MEDICAL EXAMINATION AT A HEALTH CARE FACILITY: Primary | ICD-10-CM

## 2022-02-02 DIAGNOSIS — Z11.59 ENCOUNTER FOR HEPATITIS C SCREENING TEST FOR LOW RISK PATIENT: ICD-10-CM

## 2022-02-02 PROCEDURE — 69209 REMOVE IMPACTED EAR WAX UNI: CPT | Mod: LT | Performed by: FAMILY MEDICINE

## 2022-02-02 PROCEDURE — 99395 PREV VISIT EST AGE 18-39: CPT | Mod: 25 | Performed by: FAMILY MEDICINE

## 2022-02-02 ASSESSMENT — ENCOUNTER SYMPTOMS
PALPITATIONS: 0
FEVER: 0
HEADACHES: 0
FREQUENCY: 0
WEAKNESS: 0
HEMATOCHEZIA: 0
CONSTIPATION: 0
NAUSEA: 0
MYALGIAS: 0
SHORTNESS OF BREATH: 0
ABDOMINAL PAIN: 0
DIARRHEA: 0
HEMATURIA: 0
JOINT SWELLING: 0
NERVOUS/ANXIOUS: 0
PARESTHESIAS: 0
DYSURIA: 0
CHILLS: 0
HEARTBURN: 0
COUGH: 0
DIZZINESS: 0
SORE THROAT: 0
ARTHRALGIAS: 0
EYE PAIN: 0

## 2022-02-02 ASSESSMENT — PAIN SCALES - GENERAL: PAINLEVEL: NO PAIN (0)

## 2022-02-02 ASSESSMENT — MIFFLIN-ST. JEOR: SCORE: 2016.79

## 2022-02-02 NOTE — NURSING NOTE
"Chief Complaint   Patient presents with     Physical       Initial /73   Pulse 61   Temp 97.1  F (36.2  C) (Tympanic)   Resp 16   Ht 1.854 m (6' 1\")   Wt 99.8 kg (220 lb)   SpO2 97%   BMI 29.03 kg/m   Estimated body mass index is 29.03 kg/m  as calculated from the following:    Height as of this encounter: 1.854 m (6' 1\").    Weight as of this encounter: 99.8 kg (220 lb).  Medication Reconciliation: complete  Charis Pedersen CMA  "

## 2022-02-02 NOTE — PATIENT INSTRUCTIONS
Preventive Health Recommendations  Male Ages 26 - 39    Yearly exam:             See your health care provider every year in order to  o   Review health changes.   o   Discuss preventive care.    o   Review your medicines if your doctor has prescribed any.    You should be tested each year for STDs (sexually transmitted diseases), if you re at risk.     After age 35, talk to your provider about cholesterol testing. If you are at risk for heart disease, have your cholesterol tested at least every 5 years.     If you are at risk for diabetes, you should have a diabetes test (fasting glucose).  Shots: Get a flu shot each year. Get a tetanus shot every 10 years.     Nutrition:    Eat at least 5 servings of fruits and vegetables daily.     Eat whole-grain bread, whole-wheat pasta and brown rice instead of white grains and rice.     Get adequate Calcium and Vitamin D.     Lifestyle    Exercise for at least 150 minutes a week (30 minutes a day, 5 days a week). This will help you control your weight and prevent disease.     Limit alcohol to one drink per day.     No smoking.     Wear sunscreen to prevent skin cancer.     See your dentist every six months for an exam and cleaning.     Please schedule a future laboratory appointment(s) and be sure to have fasted for 10 hours prior to arrival            Patient Education     Testicular Self-Exam (DENIS)  Testicular cancer is the most common form of cancer in men between the ages of 15 and 35. Most cases affect men under 55. It usually shows up as a painless lump in the testicle. The good news is that a simple monthly self-exam may help find trouble before it gets serious. When detected early, testicular cancer is almost 100% curable.       Doing your DENIS  Do a DENIS once a month, during or after a warm shower. The warm shower helps the scrotum relax. This makes the exam easier to do. Spend about 3 minutes to 5 minutes feeling for lumps, firm areas, or changes. If you do find a  problem, don t panic. Call your healthcare provider and make an appointment.  Check the testicles  Hold your scrotum in the palm of your hand. Roll each testicle gently between the thumbs and fingers of both hands. Feel for changes in each testicle, one at a time.  Check the epididymis  The epididymis is a raised, rim-like structure responsible for storing sperm . It runs along the top and back of each testicle and often hurts when you press on it. Gently feel each epididymis for changes. A spermatocele is a cyst filled with fluid. It may be felt on or near the epididymis or testicle. Most often, spermatoceles are painless and not cancerous.   Check the vas deferens  The vas deferens is a little tube that runs up from the top of each testicle. A normal vas deferens feels like a firm piece of cooked spaghetti. Feel for changes above each testicle.  Professional screening  If you feel any abnormalities, tell your healthcare provider right away. In addition to doing your own DENIS, you should also see your healthcare provider for regular checkups.  [employer] last reviewed this educational content on 6/1/2019 2000-2021 [employer]. All rights reserved. This information is not intended as a substitute for professional medical care. Always follow your healthcare professional's instructions.         He was intructed to use mineral or olive oil to prevent reacummulation of the cerumen. he was advised to put 2-3 drops into each ear 1 time a week before showers of baths and to rinse away any of the oily residue at the end of his shower.

## 2022-02-02 NOTE — PROGRESS NOTES
SUBJECTIVE:   CC: Kyle Coelho is an 31 year old male who presents for preventative health visit.         Healthy Habits:     Getting at least 3 servings of Calcium per day:  Yes    Bi-annual eye exam:  Yes    Dental care twice a year:  Yes    Sleep apnea or symptoms of sleep apnea:  None    Diet:  Regular (no restrictions)    Frequency of exercise:  4-5 days/week    Duration of exercise:  30-45 minutes    Taking medications regularly:  Yes    Medication side effects:  Not applicable    PHQ-2 Total Score: 0    Additional concerns today:  Yes              Today's PHQ-2 Score:   PHQ-2 ( 1999 Pfizer) 2/1/2022   Q1: Little interest or pleasure in doing things 0   Q2: Feeling down, depressed or hopeless 0   PHQ-2 Score 0   PHQ-2 Total Score (12-17 Years)- Positive if 3 or more points; Administer PHQ-A if positive -   Q1: Little interest or pleasure in doing things Not at all   Q2: Feeling down, depressed or hopeless Not at all   PHQ-2 Score 0       Abuse: Current or Past(Physical, Sexual or Emotional)- No  Do you feel safe in your environment? Yes    Have you ever done Advance Care Planning? (For example, a Health Directive, POLST, or a discussion with a medical provider or your loved ones about your wishes): No, advance care planning information given to patient to review.  Patient declined advance care planning discussion at this time.    Social History     Tobacco Use     Smoking status: Never Smoker     Smokeless tobacco: Never Used   Substance Use Topics     Alcohol use: No     If you drink alcohol do you typically have >3 drinks per day or >7 drinks per week? No    Alcohol Use 2/1/2022   Prescreen: >3 drinks/day or >7 drinks/week? No       Last PSA: No results found for: PSA    Reviewed orders with patient. Reviewed health maintenance and updated orders accordingly -       Reviewed and updated as needed this visit by clinical staff                Reviewed and updated as needed this visit by Provider                    Review of Systems   Constitutional: Negative for chills and fever.   HENT: Negative for congestion, ear pain, hearing loss and sore throat.    Eyes: Negative for pain and visual disturbance.   Respiratory: Negative for cough and shortness of breath.    Cardiovascular: Negative for chest pain, palpitations and peripheral edema.   Gastrointestinal: Negative for abdominal pain, constipation, diarrhea, heartburn, hematochezia and nausea.   Genitourinary: Negative for dysuria, frequency, genital sores, hematuria, impotence, penile discharge and urgency.   Musculoskeletal: Negative for arthralgias, joint swelling and myalgias.   Skin: Negative for rash.   Neurological: Negative for dizziness, weakness, headaches and paresthesias.   Psychiatric/Behavioral: Negative for mood changes. The patient is not nervous/anxious.          OBJECTIVE:   There were no vitals taken for this visit.    Physical Exam          ASSESSMENT/PLAN:       ICD-10-CM    1. Routine general medical examination at a health care facility  Z00.00    2. Immunosuppression (H)  D84.9    3. Ulcerative pancolitis without complication (H)  K51.00    4. Screening for diabetes mellitus  Z13.1 Comprehensive metabolic panel (BMP + Alb, Alk Phos, ALT, AST, Total. Bili, TP)   5. CARDIOVASCULAR SCREENING; LDL GOAL LESS THAN 160  Z13.6 Lipid panel reflex to direct LDL Fasting   6. Encounter for hepatitis C screening test for low risk patient  Z11.59 Hepatitis C Screen Reflex to HCV RNA Quant and Genotype       Patient has been advised of split billing requirements and indicates understanding: Yes    COUNSELING:   Reviewed preventive health counseling, as reflected in patient instructions       Regular exercise       Healthy diet/nutrition       Vision screening       Family planning       Consider Hep C screening for all patients one time for ages 18-79 years       Osteoporosis prevention/bone health    Estimated body mass index is 29.69 kg/m  as calculated  "from the following:    Height as of 9/27/21: 1.854 m (6' 1\").    Weight as of 9/27/21: 102.1 kg (225 lb).     Weight management plan: Discussed healthy diet and exercise guidelines    He reports that he has never smoked. He has never used smokeless tobacco.      Counseling Resources:  ATP IV Guidelines  Pooled Cohorts Equation Calculator  FRAX Risk Assessment  ICSI Preventive Guidelines  Dietary Guidelines for Americans, 2010  USDA's MyPlate  ASA Prophylaxis  Lung CA Screening    Anthony Jurado MD  [facility]  --------------------------------------------------------------------------------------------------------------------------------------  SUBJECTIVE:  Kyle Coelho is a 31 year old male who presents to the clinic today for a routine physical exam.    The patient's last physical was 5 years ago.     No results found for: CHOL  No results found for: HDL  No results found for: LDL  No results found for: TRIG  No results found for: CHOLHDLRATIO  The patient's last fasting lipid panel was done 6 years ago and the results are unknown to the patient.      The ASCVD Risk score ([place] DEMETRIUS Jr., et al., 2013) failed to calculate for the following reasons:    The 2013 ASCVD risk score is only valid for ages 40 to 79      Risk Enhancers:  Family history of premature ASCVD- No  LDL >159- Unknown  Chronic kidney disease- No  Metabolic Syndrome- No  Premature menopause- No  Inflammatory conditions (RA, psoriasis, HIV)- Yes  SE  Ancestry- No  Triglycerides >174- Unknown      The patient reports that he has never been treated for high blood pressure.    The patient reports that he does not take a daily aspirin.    No results found for: HCVAB  The patient reports that he has not been screened for Hepatitis C    (Screen all baby boomers once per CDC-- the generation born from 1945 through 1965 and per USPTF screen age 19 to 79 especially younger people who have used IV drugs)  He would like to have " an Hepatitis C test today    No results found for: HIAGAB  The patient reports that he has been screened for HIV   (Screen all 15 to 64 years old)  He would not like to have an HIV test today      Immunization History   Administered Date(s) Administered     MILEY19CORINNA Janssen 05/04/2021     DTAP (<7y) 10/06/1993, 03/25/1997     DTaP, Unspecified 1992, 1992, 1992     FLU 6-35 months 10/22/2014     Hep B, Peds or Adolescent 06/02/1998, 07/07/1998, 12/31/1998     Influenza (IIV3) PF 11/29/2007, 11/10/2008, 01/13/2014     Influenza Vaccine IM > 6 months Valent IIV4 (Alfuria,Fluzone) 10/04/2019     Influenza Vaccine, 6+MO IM (QUADRIVALENT W/PRESERVATIVES) 09/05/2017, 12/03/2018     MMR 06/30/1993, 06/25/2002     Meningococcal (Menomune ) 10/17/2005     OPV, unspecified 1992, 1992, 10/06/1993, 03/25/1997     Pedvax-hib 1992, 1992, 03/31/1993     Td (Adult), Adsorbed 07/16/2004     Tdap (Adacel,Boostrix) 10/22/2015     The patient's believes that his last tetanus shot was given 6 year(s) ago.   The patient believes that he has not had a Shingrix in the past  The patient believes that he has not had a PPSV23 in the past.  The patient believes that he has not had a PCV13 in the past.  The patient believes that he has not had a seasonal flu vaccination this fall or winter.  The patient would like to have a no vaccinations today       No results found for this or any previous visit.]   The patient denies a family history of colon cancer.  The patient reports that he has had a colonoscopy. He was diagnosed with ulcerative coliltis in 2017      The patient reports that he does not performs a self testicular exam monthly.  His currently used contraception is condom. He is not interested in a vasectomy in the near future.  The patient reports a family history of diabetes in his grandmother .    The patient reports that he eats or drinks 3 servings of dairy products per day.    The patient  reports that he has dental appointments approximately every 6 months.  The patient reports that he  has an eye examination approximately every 1.0 year(s).    Do you currently smoke? No  How many years have you smoked? 0   How many packs per day did you smoke on average? N/A  (if more than 30 pack year history and the patient is age 55-80 consider ordering an annual low dose radiation lung CT to screen for cancer)  (Do not order if patient has quit more than 15 years ago or has a health condition that limits life expectancy or could not tolerate curative lung surgery)  Are you interested having a lung CT to screen for lung cancer? N/A    If the patient has smoked more that 100 cigarettes, has the patient had an imaging study (US or CT) for an AAA between the ages of 65 and 75? N/A              There is no problem list on file for this patient.      Past Surgical History:   Procedure Laterality Date     COLONOSCOPY  2017    Diagnosed with      ORTHOPEDIC SURGERY  2005    Bone tumor - benign       Family History   Problem Relation Age of Onset     Diabetes Paternal Grandmother         Type 2     Other Cancer Maternal Grandfather         Blood cancer     Melanoma No family hx of      Skin Cancer No family hx of        Social History     Socioeconomic History     Marital status: Single     Spouse name: Not on file     Number of children: Not on file     Years of education: Not on file     Highest education level: Not on file   Occupational History     Not on file   Tobacco Use     Smoking status: Never Smoker     Smokeless tobacco: Never Used   Vaping Use     Vaping Use: Never used   Substance and Sexual Activity     Alcohol use: No     Drug use: No     Sexual activity: Yes     Partners: Female     Birth control/protection: Condom   Other Topics Concern     Parent/sibling w/ CABG, MI or angioplasty before 65F 55M? No   Social History Narrative     Not on file     Social Determinants of Health     Financial Resource  "Strain: Not on file   Food Insecurity: Not on file   Transportation Needs: Not on file   Physical Activity: Not on file   Stress: Not on file   Social Connections: Not on file   Intimate Partner Violence: Not on file   Housing Stability: Not on file       Current Outpatient Medications   Medication Sig Dispense Refill     HUMIRA *CF* PEN 40 MG/0.4ML pen kit INJECT 1 PEN UNDER THE SKIN  EVERY 14 DAYS. 2 each 5         PHYSICAL EXAMINATION:  Blood pressure 126/73, pulse 61, temperature 97.1  F (36.2  C), temperature source Tympanic, resp. rate 16, height 1.854 m (6' 1\"), weight 99.8 kg (220 lb), SpO2 97 %.  General appearance - healthy, alert and no distress  Skin - Skin color, texture, turgor normal. No rashes or lesions.  Head - Normocephalic. No masses, lesions, tenderness or abnormalities  Eyes - conjunctivae/corneas clear. PERRL, EOM's intact. Fundi benign  Ears - External ears normal.   left tympanic membrane(s) completely occluded with cerumen  After lavage   Canals clear. TM's normal.  Nose/Sinuses - Nares normal. Septum midline. Mucosa normal. No drainage or sinus tenderness.  Oropharynx - Lips, mucosa, and tongue normal. Teeth and gums normal.  Neck - Neck supple. No adenopathy. Thyroid symmetric, normal size,  Lungs - Percussion normal. Good diaphragmatic excursion. Lungs clear  Heart - PMI normal. No lifts, heaves, or thrills. RRR. No murmurs, clicks gallops or rub  Abdomen - Abdomen soft, non-tender. BS normal. No masses, organomegaly  Extremities - Extremities normal. No deformities, edema, or skin discoloration.  Musculoskeletal - Spine ROM normal. Muscular strength intact.  Peripheral pulses - radial=4/4, femoral=4/4, popliteal=4/4, dorsalis pedis=4/4,  Neuro - Gait normal. Reflexes normal and symmetric. Sensation grossly WNL.  Genitalia - Penis normal. No urethral discharge. Scrotum normal to palpation. No hernia.  Rectal - deferred      Lab on 01/21/2022   Component Date Value Ref Range Status     " Bilirubin Total 01/21/2022 0.5  0.2 - 1.3 mg/dL Final     Bilirubin Direct 01/21/2022 0.1  0.0 - 0.2 mg/dL Final     Protein Total 01/21/2022 8.1  6.8 - 8.8 g/dL Final     Albumin 01/21/2022 4.3  3.4 - 5.0 g/dL Final     Alkaline Phosphatase 01/21/2022 47  40 - 150 U/L Final     AST 01/21/2022 19  0 - 45 U/L Final     ALT 01/21/2022 30  0 - 70 U/L Final       ASSESSMENT:    ICD-10-CM    1. Routine general medical examination at a health care facility  Z00.00        Well-Adult Physical Exam.  Health Maintenance Due   Topic Date Due     ANNUAL REVIEW OF HM ORDERS  Never done     HIV SCREENING  Never done     HEPATITIS C SCREENING  Never done     COVID-19 Vaccine (2 - Booster for Eli series) 06/29/2021     INFLUENZA VACCINE (1) 09/01/2021     Health Maintenance   Topic Date Due     ANNUAL REVIEW OF HM ORDERS  Never done     HIV SCREENING  Never done     HEPATITIS C SCREENING  Never done     COVID-19 Vaccine (2 - Booster for Eli series) 06/29/2021     INFLUENZA VACCINE (1) 09/01/2021     PREVENTIVE CARE VISIT  02/02/2023     DTAP/TDAP/TD IMMUNIZATION (7 - Td or Tdap) 10/22/2025     ADVANCE CARE PLANNING  02/02/2027     PHQ-2  Completed     HEPATITIS B IMMUNIZATION  Completed     Pneumococcal Vaccine: Pediatrics (0 to 5 Years) and At-Risk Patients (6 to 64 Years)  Aged Out     IPV IMMUNIZATION  Aged Out     MENINGITIS IMMUNIZATION  Aged Out         HEALTH CARE MAINTENENCE: The recommended screening tests and vaccinatons for this patient have been discussed as above.  The appropriate tests and vaccinations  have been ordered or declined by the patient. Please see the orders in EPIC.The patient specifically declines: COVID booster and influenza vaccination(s)     Immunization Status:  up to date and documented except for COVID booster and influenza vaccination(s)     Patient Active Problem List   Diagnosis     Immunosuppression (H)     Ulcerative pancolitis without complication (H)        ATP III Guidelines  ICSI  Preventive Guidelines    PLAN:   The patient will make a future lab appointment for all bloodwork as they are not fasting today  Check a fasting lipid profile  Hepatitis C antibody ordered  HIV testing was discussed but the pt declined  Flu shot recommended  Influenza and COVID booster vaccine recommended  Testicular self-exam encouraged  Check a fasting glucose  Discussed calcium intake, vitamins and supplements. Recommended 1000 mg of calcium daily  Sunscreen use was recommended especially in the area of tatoos  Recommended dental exams every 6 months  Follow up in 1 year for the next preventative medical visit        Body mass index is 29.03 kg/m .        (D84.9) Immunosuppression (H)  Comment: we discussed this and the increased risk of COVID  Plan: I recommend(ed) the booster vaccines but the patient declined    (K51.00) Ulcerative pancolitis without complication (H)  Comment:   Plan[facility]

## 2022-04-27 DIAGNOSIS — K51.00 ULCERATIVE PANCOLITIS WITHOUT COMPLICATION (H): ICD-10-CM

## 2022-05-02 RX ORDER — ADALIMUMAB 40MG/0.4ML
40 KIT SUBCUTANEOUS
Qty: 2 EACH | Refills: 4 | Status: SHIPPED | OUTPATIENT
Start: 2022-05-02 | End: 2022-10-18

## 2022-05-02 NOTE — TELEPHONE ENCOUNTER
HUMIRA PEN (2/BOX) 40MG/0.4ML  Last Written Prescription Date:   11/10/2021  Last Fill Quantity: 2,   # refills: 5  Last Office Visit :  9/27/2021  Future Office visit:  None  2 Each, 5 Refills sent to pharm 5/2/2022      Yolis Catalan RN  Central Triage Red Flags/Med Refills

## 2022-05-09 NOTE — TELEPHONE ENCOUNTER
Last Clinic Visit: 6/12/18  NV: 12/3/18   Liver Function Studies -   Recent Labs   Lab Test  06/12/18   0946   PROTTOTAL  7.7   ALBUMIN  4.4   BILITOTAL  0.7   ALKPHOS  54   AST  91*   ALT  33     CBC RESULTS:   Recent Labs   Lab Test  06/12/18   0946   WBC  7.2   RBC  4.93   HGB  14.6   HCT  42.0   MCV  85   MCH  29.6   MCHC  34.8   RDW  11.9   PLT  208        Refill request for Vyvanse    Last refilled:  Disp Refills Start End     lisdexamfetamine (Vyvanse) 50 MG capsule 30 capsule 0 4/11/2022     Sig - Route: Take 1 capsule by mouth every morning. - Oral      Last CPE 8/30/21  Next CPE 9/12/22    Ashley - okay to refill? Please refill if appropriate. Thank you. Medication is ready to be signed at requested pharmacy

## 2022-09-26 ENCOUNTER — TELEPHONE (OUTPATIENT)
Dept: GASTROENTEROLOGY | Facility: CLINIC | Age: 32
End: 2022-09-26

## 2022-09-26 NOTE — TELEPHONE ENCOUNTER
PA Initiation    Medication: Humira - PA Renewal Initiated  Insurance Company: [employer] - Phone [phone] Fax [phone]  Pharmacy Filling the Rx:    Filling Pharmacy Phone:    Filling Pharmacy Fax:    Start Date: 9/26/2022    Faxed to [phone]

## 2022-09-27 ENCOUNTER — MYC MEDICAL ADVICE (OUTPATIENT)
Dept: GASTROENTEROLOGY | Facility: CLINIC | Age: 32
End: 2022-09-27

## 2022-09-27 NOTE — TELEPHONE ENCOUNTER
Received a call from [facility] about the PA for Leyla for Kyle and they are not able to approve it unless they have documentation that he has had a positive clinical response. I sent a message to Antonio (RN) to see if she would be kamryn to talk with him and then add a telephone encounter.  Once we have this we will just need to fax in a copy of the message to [facility] at [phone].

## 2022-09-27 NOTE — TELEPHONE ENCOUNTER
September 27, 2022    Called Kyle, Left message and contact info to return call regarding: update & appt    Sent message with above request.

## 2022-09-28 ENCOUNTER — PATIENT OUTREACH (OUTPATIENT)
Dept: GASTROENTEROLOGY | Facility: CLINIC | Age: 32
End: 2022-09-28

## 2022-09-29 NOTE — TELEPHONE ENCOUNTER
Received a call from [facility] about the PA for Leyla for Kyle and they are not able to approve it unless they have documentation that he has had a positive clinical response. I sent a message to Antonio (RN) to see if she would be kamryn to talk with him and then add a telephone encounter.  Once we have this we will just need to fax in a copy of the message to [facility] at [phone].

## 2022-09-30 NOTE — TELEPHONE ENCOUNTER
Kyle return my message, offered appt with PA for 10/4/22. He recently moved from the [facility] to [place] so has been a little busy. Discuss the PA process. He states he is due on 10/4/22 for his next dose.

## 2022-10-04 ENCOUNTER — VIRTUAL VISIT (OUTPATIENT)
Dept: GASTROENTEROLOGY | Facility: CLINIC | Age: 32
End: 2022-10-04
Payer: COMMERCIAL

## 2022-10-04 DIAGNOSIS — K51.00 ULCERATIVE PANCOLITIS WITHOUT COMPLICATION (H): Primary | ICD-10-CM

## 2022-10-04 PROCEDURE — 99214 OFFICE O/P EST MOD 30 MIN: CPT | Mod: 95 | Performed by: DIETITIAN, REGISTERED

## 2022-10-04 NOTE — PATIENT INSTRUCTIONS
It was a pleasure taking care of you today.  I've included a brief summary of our discussion and care plan from today's visit below.  Please review this information with your primary care provider.  ______________________________________________________________________    My recommendations are summarized as follows:  -- Continue adalimumab every 14 days  -- Blood work every 6 months on adalimumab: CBC, LFTs, CRP, ESR     -- please see scheduling information provided below     Return to GI Clinic in 6 months to review your progress.    ______________________________________________________________________    How do I schedule labs, imaging studies, or procedures that were ordered in clinic today?     Labs: To schedule lab appointment at the [facility], use my chart or call [phone]. If you have a [facility] lab closer to home where you are regularly seen you can give them a call.     Procedures: If a colonoscopy, upper endoscopy, breath test, esophageal manometry, or pH impedence was ordered today, our endoscopy team will call you to schedule this. If you have not heard from our endoscopy team within a week, please call [phone] to schedule.     Imaging Studies: If you were scheduled for a CT scan, X-ray, MRI, ultrasound, HIDA scan or other imaging study, please call [phone] to have this scheduled.     Referral: If a referral to another specialty was ordered, expect a phone call or follow instructions above. If you have not heard from anyone regarding your referral in a week, please call our clinic to check the status.     Who do I call with any questions after my visit?  Please be in touch if there are any further questions that arise following today's visit.  There are multiple ways to contact your gastroenterology care team.      During business hours, you may reach a Gastroenterology nurse at [phone]    To schedule or reschedule an appointment, please call [phone].     You  can always send a secure message through Apozy.  Apozy messages are answered by your nurse or doctor typically within 24 hours.  Please allow extra time on weekends and holidays.      For urgent/emergent questions after business hours, you may reach the on-call GI Fellow by contacting the [facility]  at [phone].     How will I get the results of any tests ordered?    You will receive all of your results.  If you have signed up for PocketMobilet, any tests ordered at your visit will be available to you after your provider reviews them.  Typically this takes 1-2 weeks.  If there are urgent results that require a change in your care plan, your provider or nurse will call you to discuss the next steps.      What is Apozy?  Apozy is a secure way for you to access all of your healthcare records from the [facility].  It is a web based computer program, so you can sign on to it from any location.  It also allows you to send secure messages to your care team.  I recommend signing up for Apozy access if you have not already done so and are comfortable with using a computer.      How to I schedule a follow-up visit?  If you did not schedule a follow-up visit today, please call [phone] to schedule a follow-up office visit.      Sincerely,    Candy Hancock PA-C  Division of Gastroenterology, Hepatology & Nutrition  [facility]

## 2022-10-04 NOTE — PROGRESS NOTES
Kyle is a 32 year old who is being evaluated via a billable video visit.      How would you like to obtain your AVS? MyChart  If the video visit is dropped, the invitation should be resent by: Text to cell phone: [phone]  Will anyone else be joining your video visit? No        Video-Visit Details    Video Start Time: 4:00    Type of service:  Video Visit    Video End Time:4:15    Originating Location (pt. Location): Home    Distant Location (provider location):  [facility] [place]     Platform used for Video Visit: AppCard

## 2022-10-04 NOTE — LETTER
10/4/2022         RE: Kyle Coelho  [address]  [place] [address]        Dear Colleague,    Thank you for referring your patient, Kyle Coelho, to the [facility] [place]. Please see a copy of my visit note below.    IBD CLINIC VISIT     CC/REFERRING MD:  Self referred  REASON FOR FOLLOW UP: Ulcerative colitis    ASSESSMENT/PLAN  32 year old male with a history of ulcerative pancolitis.     1.  Ulcerative pancolitis:   Current Medications  - Adalimumab (2017) q14 days  Current Clinical Disease Activity: Remission (Maddox score 0)  Last Endoscopic Disease Activity: eMayo 0 in 2/2018    He remains in clinical remission on adalimumab monotherapy every 2 weeks. Labs 1/2022 all within normal limits, due for repeat draw. Plan to continue adalimumab every 14 days for maintenance of remission.     -- Continue adalimumab every 14 days  -- Blood work every 6 months on adalimumab: CBC, LFTs, CRP, ESR - Due now    2.  Joint pain: Resolved on Humira    3. Elevated liver tests: Suspect related to vigorous exercise, LFTs 1/2022, plan for redraw.    -- Repeat liver tests    IBD dysplasia surveillance: Given pancolitis colonoscopy every 1-3 years recommended after 8 years of disease.  Dysplasia screening is recommended 2024.    Misc:  -- Avoid tobacco use  -- Avoid NSAIDs as there is potentially a 25% chance of causing an IBD flare    RTC 6 months    Candy Hancock PA-C  Division of Gastroenterology, Hepatology & Nutrition  [facility]      IBD HISTORY  Age at diagnosis: 26, 2017  Extent of disease: Pancolitis  Prior UC surgeries: None  Prior IBD Medications:   -- Lialda - flu like illness with fevers, abdominal pains  -- Apriso - flu like illlness  -- Sulfasalazine - did not control symptoms    DRUG MONITORING  TPMT enzyme activity: --    6-TGN/6-MMPN levels: --    Biologic concentration:  6/12/18 ADA level 12.4, no antibodies (every 14 days, monotherapy)    IBDQ  Score Date IBDQ - Total Score  (Higher score better)   10/4/2022 59   2019 63   12/3/2018 63        HPI:   Kyle is here today for follow up. Reports he feels the best best he's ever felt since UC diagnosis.    BM pattern - 1 bowel movement at 8am every day, formed easy to pass. Occasionally second around lunchtime that is looser if eating more vegetables. No blood, no urgency, no night time awakening. No abdominal pain.    No EIM. No perianal symptoms. No fevers, chills, or unintentional weight loss.    Weight gain during Covid. Now strict with diet, eating lean protein, working out frequently.    Headaches now less intense and less frequent every 1.5 months. Bilateral over temples, no nausea or vision changes.    Just moved to [place]. Has 1 year old and 3 year old keeping him busy.      Maddox score:   Stool freq: 0 (baseline stools frequency)  Rectal bleedin (None)  PGA: 0 (normal)  Endoscopy: --    Remission: <3   Mild disease: 3-5  Moderate disease: 6-10  Severe disease: >10    ROS:  Complete 10 System ROS performed. All are negative except as documented below, in the HPI, or in patient questionnaire from today's visit.  No fevers or chills  No weight loss  No blurry vision, double vision or change in vision  No sore throat  No lymphadenopathy  No headache, paraesthesias, or weakness in a limb  No shortness of breath or wheezing  No chest pain or pressure  No arthralgias or myalgias  No rashes or skin changes  No odynophagia or dysphagia  No BRBPR, hematochezia, melena  No dysuria, frequency or urgency  No hot/cold intolerance or polyria  No anxiety or depression    Extra intestinal manifestations of IBD:  No uveitis/episcleritis  No aphthous ulcers   No arthritis   No erythema nodosum/pyoderma gangrenosum.     PERTINENT PAST MEDICAL HISTORY:  See HPI    PREVIOUS SURGERIES:    ALLERGIES:   No Known Allergies    PERTINENT MEDICATIONS:    Current Outpatient Medications:      adalimumab (HUMIRA *CF* PEN) 40  MG/0.4ML pen kit, Inject 0.4 mLs (40 mg) Subcutaneous every 14 days, Disp: 2 each, Rfl: 4    SOCIAL HISTORY:  Social History     Socioeconomic History     Marital status:      Spouse name: Not on file     Number of children: Not on file     Years of education: Not on file     Highest education level: Not on file   Occupational History     Not on file   Tobacco Use     Smoking status: Never Smoker     Smokeless tobacco: Never Used   Vaping Use     Vaping Use: Never used   Substance and Sexual Activity     Alcohol use: No     Drug use: No     Sexual activity: Yes     Partners: Female     Birth control/protection: Condom   Other Topics Concern     Parent/sibling w/ CABG, MI or angioplasty before 65F 55M? No   Social History Narrative     Not on file     Social Determinants of Health     Financial Resource Strain: Not on file   Food Insecurity: Not on file   Transportation Needs: Not on file   Physical Activity: Not on file   Stress: Not on file   Social Connections: Not on file   Intimate Partner Violence: Not on file   Housing Stability: Not on file     FAMILY HISTORY:  No CRC, no IBD  Past/family/social history reviewed and no changes    PHYSICAL EXAMINATION:  Constitutional: aaox3, cooperative, pleasant, not dyspneic/diaphoretic, no acute distress  Vitals reviewed: There were no vitals taken for this visit.  Wt:   Wt Readings from Last 2 Encounters:   02/02/22 99.8 kg (220 lb)   09/27/21 102.1 kg (225 lb)      General appearance:  Healthy appearing adult, in no acute distress  Eyes: Sclera anicteric, Pupils round and reactive to light  Ears, nose, mouth and throat: No obvious external lesions of ears and nose, Hearing intact  Neck: Symmetric, No obvious external lesions  Respiratory: Normal respiration, no use of accessory muscles   Skin: No rashes or jaundice   Psychiatric: Oriented to person, place and time, Appropriate mood and affect.     Answers for HPI/ROS submitted by the patient on 10/4/2022  General  Symptoms: No  Skin Symptoms: No  HENT Symptoms: No  EYE SYMPTOMS: No  HEART SYMPTOMS: No  LUNG SYMPTOMS: No  INTESTINAL SYMPTOMS: No  URINARY SYMPTOMS: No  REPRODUCTIVE SYMPTOMS: No  SKELETAL SYMPTOMS: No  BLOOD SYMPTOMS: No  NERVOUS SYSTEM SYMPTOMS: No  MENTAL HEALTH SYMPTOMS: No          Sincerely,    Candy Hancock PA-C

## 2022-10-04 NOTE — PROGRESS NOTES
IBD CLINIC VISIT     CC/REFERRING MD:  Self referred  REASON FOR FOLLOW UP: Ulcerative colitis    ASSESSMENT/PLAN  32 year old male with a history of ulcerative pancolitis.     1.  Ulcerative pancolitis:   Current Medications  - Adalimumab (2017) q14 days  Current Clinical Disease Activity: Remission (Maddox score 0)  Last Endoscopic Disease Activity: eMayo 0 in 2/2018    He remains in clinical remission on adalimumab monotherapy every 2 weeks. Labs 1/2022 all within normal limits, due for repeat draw. Plan to continue adalimumab every 14 days for maintenance of remission.     -- Continue adalimumab every 14 days  -- Blood work every 6 months on adalimumab: CBC, LFTs, CRP, ESR - Due now    2.  Joint pain: Resolved on Humira    3. Elevated liver tests: Suspect related to vigorous exercise, LFTs 1/2022, plan for redraw.    -- Repeat liver tests    IBD dysplasia surveillance: Given pancolitis colonoscopy every 1-3 years recommended after 8 years of disease.  Dysplasia screening is recommended 2024.    Misc:  -- Avoid tobacco use  -- Avoid NSAIDs as there is potentially a 25% chance of causing an IBD flare    RTC 6 months    Candy Hancock PA-C  Division of Gastroenterology, Hepatology & Nutrition  [facility]      IBD HISTORY  Age at diagnosis: 26, 2017  Extent of disease: Pancolitis  Prior UC surgeries: None  Prior IBD Medications:   -- Lialda - flu like illness with fevers, abdominal pains  -- Apriso - flu like illlness  -- Sulfasalazine - did not control symptoms    DRUG MONITORING  TPMT enzyme activity: --    6-TGN/6-MMPN levels: --    Biologic concentration:  6/12/18 ADA level 12.4, no antibodies (every 14 days, monotherapy)    IBDQ Score Date IBDQ - Total Score  (Higher score better)   10/4/2022 59   5/6/2019 63   12/3/2018 63        HPI:   Kyle is here today for follow up. Reports he feels the best best he's ever felt since UC diagnosis.    BM pattern - 1 bowel movement at 8am every day, formed easy to  pass. Occasionally second around lunchtime that is looser if eating more vegetables. No blood, no urgency, no night time awakening. No abdominal pain.    No EIM. No perianal symptoms. No fevers, chills, or unintentional weight loss.    Weight gain during Covid. Now strict with diet, eating lean protein, working out frequently.    Headaches now less intense and less frequent every 1.5 months. Bilateral over temples, no nausea or vision changes.    Just moved to [place]. Has 1 year old and 3 year old keeping him busy.      Maddox score:   Stool freq: 0 (baseline stools frequency)  Rectal bleedin (None)  PGA: 0 (normal)  Endoscopy: --    Remission: <3   Mild disease: 3-5  Moderate disease: 6-10  Severe disease: >10    ROS:  Complete 10 System ROS performed. All are negative except as documented below, in the HPI, or in patient questionnaire from today's visit.  No fevers or chills  No weight loss  No blurry vision, double vision or change in vision  No sore throat  No lymphadenopathy  No headache, paraesthesias, or weakness in a limb  No shortness of breath or wheezing  No chest pain or pressure  No arthralgias or myalgias  No rashes or skin changes  No odynophagia or dysphagia  No BRBPR, hematochezia, melena  No dysuria, frequency or urgency  No hot/cold intolerance or polyria  No anxiety or depression    Extra intestinal manifestations of IBD:  No uveitis/episcleritis  No aphthous ulcers   No arthritis   No erythema nodosum/pyoderma gangrenosum.     PERTINENT PAST MEDICAL HISTORY:  See HPI    PREVIOUS SURGERIES:    ALLERGIES:   No Known Allergies    PERTINENT MEDICATIONS:    Current Outpatient Medications:      adalimumab (HUMIRA *CF* PEN) 40 MG/0.4ML pen kit, Inject 0.4 mLs (40 mg) Subcutaneous every 14 days, Disp: 2 each, Rfl: 4    SOCIAL HISTORY:  Social History     Socioeconomic History     Marital status:      Spouse name: Not on file     Number of children: Not on file     Years of education: Not on  file     Highest education level: Not on file   Occupational History     Not on file   Tobacco Use     Smoking status: Never Smoker     Smokeless tobacco: Never Used   Vaping Use     Vaping Use: Never used   Substance and Sexual Activity     Alcohol use: No     Drug use: No     Sexual activity: Yes     Partners: Female     Birth control/protection: Condom   Other Topics Concern     Parent/sibling w/ CABG, MI or angioplasty before 65F 55M? No   Social History Narrative     Not on file     Social Determinants of Health     Financial Resource Strain: Not on file   Food Insecurity: Not on file   Transportation Needs: Not on file   Physical Activity: Not on file   Stress: Not on file   Social Connections: Not on file   Intimate Partner Violence: Not on file   Housing Stability: Not on file     FAMILY HISTORY:  No CRC, no IBD  Past/family/social history reviewed and no changes    PHYSICAL EXAMINATION:  Constitutional: aaox3, cooperative, pleasant, not dyspneic/diaphoretic, no acute distress  Vitals reviewed: There were no vitals taken for this visit.  Wt:   Wt Readings from Last 2 Encounters:   02/02/22 99.8 kg (220 lb)   09/27/21 102.1 kg (225 lb)      General appearance:  Healthy appearing adult, in no acute distress  Eyes: Sclera anicteric, Pupils round and reactive to light  Ears, nose, mouth and throat: No obvious external lesions of ears and nose, Hearing intact  Neck: Symmetric, No obvious external lesions  Respiratory: Normal respiration, no use of accessory muscles   Skin: No rashes or jaundice   Psychiatric: Oriented to person, place and time, Appropriate mood and affect.   Answers for HPI/ROS submitted by the patient on 10/4/2022  General Symptoms: No  Skin Symptoms: No  HENT Symptoms: No  EYE SYMPTOMS: No  HEART SYMPTOMS: No  LUNG SYMPTOMS: No  INTESTINAL SYMPTOMS: No  URINARY SYMPTOMS: No  REPRODUCTIVE SYMPTOMS: No  SKELETAL SYMPTOMS: No  BLOOD SYMPTOMS: No  NERVOUS SYSTEM SYMPTOMS: No  MENTAL HEALTH  SYMPTOMS: No

## 2022-10-05 ENCOUNTER — TELEPHONE (OUTPATIENT)
Dept: GASTROENTEROLOGY | Facility: CLINIC | Age: 32
End: 2022-10-05

## 2022-10-05 DIAGNOSIS — K51.00 ULCERATIVE PANCOLITIS WITHOUT COMPLICATION (H): Primary | ICD-10-CM

## 2022-10-05 NOTE — TELEPHONE ENCOUNTER
----- Message from Candy Hancock PA-C sent at 10/4/2022  4:15 PM CDT -----  Regarding: Humira refill and labs  Hel!    I just saw Kyle - he is feeling the best he has ever felt since UC diagnosis!    He needs a refill of Humira 40 mg every 14 days. He also just moved up to [place], would I be possible to get labs set up through [facility] there?    Thanks!  Candy

## 2022-10-05 NOTE — TELEPHONE ENCOUNTER
Ordered CBC, LFTs, CRP, ESR to be drawn every 3 months.    Per chart review, Humira PA requested today with clinical notes.    [facility] Team,  Please fax lab orders to     [facility]  [phone] (f) [phone]    Please follow up to confirm receipt.    Thank you.  Antonio

## 2022-10-06 ENCOUNTER — TELEPHONE (OUTPATIENT)
Dept: GASTROENTEROLOGY | Facility: CLINIC | Age: 32
End: 2022-10-06

## 2022-10-06 NOTE — TELEPHONE ENCOUNTER
Spoke with patient over the phone. Patient wanted to call back to schedule a follow up with Candy Hancock in about 6 months (around 4/4/23). Sent [employer]

## 2022-10-07 NOTE — TELEPHONE ENCOUNTER
Prior Authorization Approval    Authorization Effective Date: 10/7/2022  Authorization Expiration Date: 10/7/2023  Medication: Humira - PA Approved  Approved Dose/Quantity: UD  Reference #: Faxed to [phone]   Insurance Company: [employer] - Phone [phone] Fax [phone]  Expected CoPay:       CoPay Card Available:      Foundation Assistance Needed:    Which Pharmacy is filling the prescription (Not needed for infusion/clinic administered):    Pharmacy Notified: [place], [facility] Renewal  Patient Notified:  Yes, called and let Kyle know this was approved through 10/07/23

## 2022-10-07 NOTE — TELEPHONE ENCOUNTER
9/27 - Received a call from [facility] about the PA for Humira for Kyle and they are not able to approve it unless they have documentation that he has had a positive clinical response. I sent a message to Antonio (RN) to see if she would be kamryn to talk with him and then add a telephone encounter.  Once we have this we will just need to fax in a copy of the message to [facility] at [phone].  9/30 - PT has appt on 10/04  10/5 - Faxed in additional information (clinicals) to [phone]  10/7 - Called to check status and this is still pending and should have a decision within 24-72 hours.

## 2022-10-13 DIAGNOSIS — K51.00 ULCERATIVE PANCOLITIS WITHOUT COMPLICATION (H): ICD-10-CM

## 2022-10-18 NOTE — TELEPHONE ENCOUNTER
adalimumab (HUMIRA *CF* PEN) 40 MG/0.4ML pen kit      Last Written Prescription Date:  5/22/2022  Last Fill Quantity: 2 each,   # refills: 4  Last Office Visit : 10/4/2022  [facility]  Future Office visit:  none    Routing refill request to provider for review/approval because:  Failed GI medication protocol: labs due  - >3 months last CBC, CRP, ESR, Hep Panel      CBC RESULTS: Recent Labs   Lab Test 09/13/21  1542   WBC 6.8   RBC 4.81   HGB 14.4   HCT 41.7   MCV 87   MCH 29.9   MCHC 34.5   RDW 12.0        CRP Inflammation   Date Value Ref Range Status   09/13/2021 <2.9 0.0 - 8.0 mg/L Final   02/26/2021 <2.9 0.0 - 8.0 mg/L Final     Sed Rate   Date Value Ref Range Status   02/26/2021 5 0 - 15 mm/h Final     Erythrocyte Sedimentation Rate   Date Value Ref Range Status   09/13/2021 6 0 - 15 mm/hr Final     Liver Function Studies - Recent Labs   Lab Test 01/21/22  0850   PROTTOTAL 8.1   ALBUMIN 4.3   BILITOTAL 0.5   ALKPHOS 47   AST 19   ALT 30

## 2022-10-19 NOTE — TELEPHONE ENCOUNTER
Called to verify the order was received and they did not get. Informed will refax. After off the phone realized writer had sent to telephone number not fax number.     Lab order faxed to [facility] at [phone].

## 2022-10-20 RX ORDER — ADALIMUMAB 40MG/0.4ML
40 KIT SUBCUTANEOUS
Qty: 0.8 ML | Refills: 0 | Status: SHIPPED | OUTPATIENT
Start: 2022-10-20 | End: 2022-12-09

## 2022-10-20 NOTE — TELEPHONE ENCOUNTER
Reorder 1 refill of Humira. Pt needs to get labs to get more refills.    Pt also need a RV in 6 months.    Sent message to pt with above information.

## 2022-11-09 ENCOUNTER — TRANSFERRED RECORDS (OUTPATIENT)
Dept: HEALTH INFORMATION MANAGEMENT | Facility: CLINIC | Age: 32
End: 2022-11-09

## 2022-11-09 LAB
ALT SERPL-CCNC: 15 IU/L (ref 6–40)
AST SERPL-CCNC: 16 IU/L (ref 10–40)

## 2022-11-17 NOTE — TELEPHONE ENCOUNTER
Pt completed labs but still need a follow up appointment.    Sent portal message for pt to call to make appointment.    [facility]INIC [facility]AT[facility],  Please call pt to schedule Return/follow up visit around April 2023.    Please let RN know when scheduled.    Thank you.  Antonio

## 2022-12-06 DIAGNOSIS — K51.00 ULCERATIVE PANCOLITIS WITHOUT COMPLICATION (H): ICD-10-CM

## 2022-12-08 NOTE — TELEPHONE ENCOUNTER
Medicare Wellness Visit  Plan for Preventive Care    A good way for you to stay healthy is to use preventive care.  Medicare covers many services that can help you stay healthy.* The goal of these services is to find any health problems as quickly as possible. Finding problems early can help make them easier to treat.  Your personal plan below lists the services you may need and when they are due.     Health Maintenance Summary     Pneumococcal Vaccine 0-64 (1 of 1 - PPSV23)  Overdue since 11/26/1983    Hepatitis B Vaccine (1 of 3 - Risk 3-dose series)  Overdue since 11/26/1996    DM/CKD Microalbumin (Yearly)  Overdue since 3/22/2020    Diabetes Foot Exam (Yearly)  Overdue since 3/22/2020    Medicare Wellness Visit (Yearly)  Overdue since 7/12/2020    Diabetes A1C (Every 6 Months)  Overdue since 7/15/2020    Diabetes Eye Exam (Yearly)  Overdue since 7/23/2020    Influenza Vaccine (1)  Due since 9/1/2020    DM/CKD GFR (Yearly)  Next due on 6/22/2021    Cervical Cancer Screening HPV CO-Testing (Every 5 Years)  Next due on 5/7/2023    DTaP/Tdap/Td Vaccine (2 - Td)  Next due on 10/3/2026    Meningococcal Vaccine   Aged Out    HPV Vaccine   Aged Out           Preventive Care for Women and Men    Heart Screenings (Cardiovascular):  · Blood tests are used to check your cholesterol, lipid and triglyceride levels. High levels can increase your risk for heart disease and stroke. High levels can be treated with medications, diet and exercise. Lowering your levels can help keep your heart and blood vessels healthy.  Your provider will order these tests if they are needed.    · An ultrasound is done to see if you have an abdominal aortic aneurysm (AAA).  This is an enlargement of one of the main blood vessels that delivers blood to the body.   In the [place], 9,000 deaths are caused by AAA.  You may not even know you have this problem and as many as 1 in 3 people will have a serious problem if it is not treated.  Early    adalimumab (HUMIRA *CF* PEN) 40 MG/0.4ML pen kit   Last Written Prescription Date:  10/20/22  Last Fill Quantity: .08ml,   # refills:0  Last Office Visit : 10/4/22  Future Office visit:  None    Lab Results   Component Value Date    WBC 6.8 09/13/2021    WBC 6.4 02/26/2021     Lab Results   Component Value Date    RBC 4.81 09/13/2021    RBC 5.04 02/26/2021     Lab Results   Component Value Date    HGB 14.4 09/13/2021    HGB 14.9 02/26/2021     Lab Results   Component Value Date    HCT 41.7 09/13/2021    HCT 42.9 02/26/2021     No components found for: MCT  Lab Results   Component Value Date    MCV 87 09/13/2021    MCV 85 02/26/2021     Lab Results   Component Value Date    MCH 29.9 09/13/2021    MCH 29.6 02/26/2021     Lab Results   Component Value Date    MCHC 34.5 09/13/2021    MCHC 34.7 02/26/2021     Lab Results   Component Value Date    RDW 12.0 09/13/2021    RDW 11.8 02/26/2021     Lab Results   Component Value Date     09/13/2021     02/26/2021 11/9/22  crp    0.0 - 0.8 mg/dL <0.1      11/9/22  Esr     0 - 20 mm/hr 2          Lab Results   Component Value Date    AST 19 01/21/2022     02/26/2021     Lab Results   Component Value Date    ALT 30 01/21/2022    ALT 78 02/26/2021         Routing refill request to provider for review/approval because:liver,  cbc       diagnosis allows for more effective treatment and cure.  If you have a family history of AAA or are a male age 65-75 who has smoked, you are at higher risk of an AAA.  Your provider can order this test, if needed.    Colorectal Screening:  · There are many tests that are used to check for cancer of your colon and rectum. You and your provider should discuss what test is best for you and when to have it done.  Options include:  · Screening Colonoscopy: exam of the entire colon, seen through a flexible lighted tube.  · Flexible Sigmoidoscopy: exam of the last third (sigmoid portion) of the colon and rectum, seen through a flexible lighted tube.  · Cologuard DNA stool test: a sample of your stool is used to screen for cancer and unseen blood in your stool.  · Fecal Occult Blood Test: a sample of your stool is studied to find any unseen blood    Flu Shot:  · An immunization that helps to prevent influenza (the flu). You should get this every year. The best time to get the shot is in the fall.    Pneumococcal Shot:  • Vaccines are available that can help prevent pneumococcal disease, which is any type of infection caused by Streptococcus pneumoniae bacteria.   Their use can prevent some cases of pneumonia, meningitis, and sepsis. There are two types of pneumococcal vaccines:   o Conjugate vaccines (PCV-13 or Prevnar 13®) - helps protect against the 13 types of pneumococcal bacteria that are the most common causes of serious infections in children and adults.    o Polysaccharide vaccine (PPSV23 or Hhuujshyu74®) - helps protect against 23 types of pneumococcal bacteria for patients who are recommended to get it.  These vaccines should be given at least 12 months apart.  A booster is usually not needed.     Hepatitis B Shot:  · An immunization that helps to protect people from getting Hepatitis B. Hepatitis B is a virus that spreads through contact with infected blood or body fluids. Many people with the virus do not have  symptoms.  The virus can lead to serious problems, such as liver disease. Some people are at higher risk than others. Your doctor will tell you if you need this shot.     Diabetes Screening:  · A test to measure sugar (glucose) in your blood is called a fasting blood sugar. Fasting means you cannot have food or drink for at least 8 hours before the test. This test can detect diabetes long before you may notice symptoms.    Glaucoma Screening:  · Glaucoma screening is performed by your eye doctor. The test measures the fluid pressure inside your eyes to determine if you have glaucoma.     Hepatitis C Screening:  · A blood test to see if you have the hepatitis C virus.  Hepatitis C attacks the liver and is a major cause of chronic liver disease.  Medicare will cover a single screening for all adults born between 1945 & 1965, or high risk patients (people who have injected illegal drugs or people who have had blood transfusions).  High risk patients who continue to inject illegal drugs can be screened for Hepatitis C every year.    Smoking and Tobacco-Use Cessation Counseling:  · Tobacco is the single greatest cause of disease and early death in our country today. Medication and counseling together can increase a person’s chance of quitting for good.   · Medicare covers two quitting attempts per year, with four counseling sessions per attempt (eight sessions in a 12 month period)    Preventive Screening tests for Women    Screening Mammograms and Breast Exams:  · An x-ray of your breasts to check for breast cancer before you or your doctor may be able to feel it.  If breast cancer is found early it can usually be treated with success.    Pelvic Exams and Pap Tests:  · An exam to check for cervical and vaginal cancer. A Pap test is a lab test in which cells are taken from your cervix and sent to the lab to look for signs of cervical cancer. If cancer of the cervix is found early, chances for a cure are good. Testing can  generally end at age 65, or if a woman has a hysterectomy for a benign condition. Your provider may recommend more frequent testing if certain abnormal results are found.    Bone Mass Measurements:  · A painless x-ray of your bone density to see if you are at risk for a broken bone. Bone density refers to the thickness of bones or how tightly the bone tissue is packed.    Preventive Screening tests for Men    Prostate Screening:  · Should you have a prostate cancer test (PSA)?  It is up to you to decide if you want a prostate cancer test. Talk to your clinician to find out if the test is right for you.  Things for you to consider and talk about should include:  · Benefits and harms of the test  · Your family history  · How your race/ethnicity may influence the test  · If the test may impact other medical conditions you have  · Your values on screenings and treatments    *Medicare pays for many preventive services to keep you healthy. For some of these services, you might have to pay a deductible, coinsurance, and / or copayment.  The amounts vary depending on the type of services you need and the kind of Medicare health plan you have.              Medicare Wellness Visit  Plan for Preventive Care    A good way for you to stay healthy is to use preventive care.  Medicare covers many services that can help you stay healthy.* The goal of these services is to find any health problems as quickly as possible. Finding problems early can help make them easier to treat.  Your personal plan below lists the services you may need and when they are due.     Health Maintenance Summary     Pneumococcal Vaccine 0-64 (1 of 1 - PPSV23)  Overdue since 11/26/1983    Hepatitis B Vaccine (1 of 3 - Risk 3-dose series)  Overdue since 11/26/1996    DM/CKD Microalbumin (Yearly)  Overdue since 3/22/2020    Diabetes A1C (Every 6 Months)  Overdue since 7/15/2020    Diabetes Eye Exam (Yearly)  Overdue since 7/23/2020    Influenza Vaccine (1)   Due since 9/1/2020    DM/CKD GFR (Yearly)  Next due on 6/22/2021    Diabetes Foot Exam (Yearly)  Next due on 9/4/2021    Medicare Wellness Visit (Yearly)  Next due on 9/4/2021    Cervical Cancer Screening HPV CO-Testing (Every 5 Years)  Next due on 5/7/2023    DTaP/Tdap/Td Vaccine (2 - Td)  Next due on 10/3/2026    Meningococcal Vaccine   Aged Out    HPV Vaccine   Aged Out           Preventive Care for Women and Men    Heart Screenings (Cardiovascular):  · Blood tests are used to check your cholesterol, lipid and triglyceride levels. High levels can increase your risk for heart disease and stroke. High levels can be treated with medications, diet and exercise. Lowering your levels can help keep your heart and blood vessels healthy.  Your provider will order these tests if they are needed.    · An ultrasound is done to see if you have an abdominal aortic aneurysm (AAA).  This is an enlargement of one of the main blood vessels that delivers blood to the body.   In the [place], 9,000 deaths are caused by AAA.  You may not even know you have this problem and as many as 1 in 3 people will have a serious problem if it is not treated.  Early diagnosis allows for more effective treatment and cure.  If you have a family history of AAA or are a male age 65-75 who has smoked, you are at higher risk of an AAA.  Your provider can order this test, if needed.    Colorectal Screening:  · There are many tests that are used to check for cancer of your colon and rectum. You and your provider should discuss what test is best for you and when to have it done.  Options include:  · Screening Colonoscopy: exam of the entire colon, seen through a flexible lighted tube.  · Flexible Sigmoidoscopy: exam of the last third (sigmoid portion) of the colon and rectum, seen through a flexible lighted tube.  · Cologuard DNA stool test: a sample of your stool is used to screen for cancer and unseen blood in your stool.  · Fecal Occult Blood  Test: a sample of your stool is studied to find any unseen blood    Flu Shot:  · An immunization that helps to prevent influenza (the flu). You should get this every year. The best time to get the shot is in the fall.    Pneumococcal Shot:  • Vaccines are available that can help prevent pneumococcal disease, which is any type of infection caused by Streptococcus pneumoniae bacteria.   Their use can prevent some cases of pneumonia, meningitis, and sepsis. There are two types of pneumococcal vaccines:   o Conjugate vaccines (PCV-13 or Prevnar 13®) - helps protect against the 13 types of pneumococcal bacteria that are the most common causes of serious infections in children and adults.    o Polysaccharide vaccine (PPSV23 or Qrxggoact36®) - helps protect against 23 types of pneumococcal bacteria for patients who are recommended to get it.  These vaccines should be given at least 12 months apart.  A booster is usually not needed.     Hepatitis B Shot:  · An immunization that helps to protect people from getting Hepatitis B. Hepatitis B is a virus that spreads through contact with infected blood or body fluids. Many people with the virus do not have symptoms.  The virus can lead to serious problems, such as liver disease. Some people are at higher risk than others. Your doctor will tell you if you need this shot.     Diabetes Screening:  · A test to measure sugar (glucose) in your blood is called a fasting blood sugar. Fasting means you cannot have food or drink for at least 8 hours before the test. This test can detect diabetes long before you may notice symptoms.    Glaucoma Screening:  · Glaucoma screening is performed by your eye doctor. The test measures the fluid pressure inside your eyes to determine if you have glaucoma.     Hepatitis C Screening:  · A blood test to see if you have the hepatitis C virus.  Hepatitis C attacks the liver and is a major cause of chronic liver disease.  Medicare will cover a single  screening for all adults born between 1945 & 1965, or high risk patients (people who have injected illegal drugs or people who have had blood transfusions).  High risk patients who continue to inject illegal drugs can be screened for Hepatitis C every year.    Smoking and Tobacco-Use Cessation Counseling:  · Tobacco is the single greatest cause of disease and early death in our country today. Medication and counseling together can increase a person’s chance of quitting for good.   · Medicare covers two quitting attempts per year, with four counseling sessions per attempt (eight sessions in a 12 month period)    Preventive Screening tests for Women    Screening Mammograms and Breast Exams:  · An x-ray of your breasts to check for breast cancer before you or your doctor may be able to feel it.  If breast cancer is found early it can usually be treated with success.    Pelvic Exams and Pap Tests:  · An exam to check for cervical and vaginal cancer. A Pap test is a lab test in which cells are taken from your cervix and sent to the lab to look for signs of cervical cancer. If cancer of the cervix is found early, chances for a cure are good. Testing can generally end at age 65, or if a woman has a hysterectomy for a benign condition. Your provider may recommend more frequent testing if certain abnormal results are found.    Bone Mass Measurements:  · A painless x-ray of your bone density to see if you are at risk for a broken bone. Bone density refers to the thickness of bones or how tightly the bone tissue is packed.    Preventive Screening tests for Men    Prostate Screening:  · Should you have a prostate cancer test (PSA)?  It is up to you to decide if you want a prostate cancer test. Talk to your clinician to find out if the test is right for you.  Things for you to consider and talk about should include:  · Benefits and harms of the test  · Your family history  · How your race/ethnicity may influence the test  · If  the test may impact other medical conditions you have  · Your values on screenings and treatments    *Medicare pays for many preventive services to keep you healthy. For some of these services, you might have to pay a deductible, coinsurance, and / or copayment.  The amounts vary depending on the type of services you need and the kind of Medicare health plan you have.

## 2022-12-09 RX ORDER — ADALIMUMAB 40MG/0.4ML
40 KIT SUBCUTANEOUS
Qty: 2 EACH | Refills: 4 | Status: SHIPPED | OUTPATIENT
Start: 2022-12-09 | End: 2023-06-13

## 2022-12-09 NOTE — TELEPHONE ENCOUNTER
Rx Refill Request:   Humira 40mg every 2 weeks  Last Written Prescription Date: 10/20/22  Last Fill Quantity:  .8ml,   # refills: 0    Future Office visit: Not schedule (RTN in 6months)  Last Office Visit :  10/4/22    Dx: Ulcerative pancolitis  Current Medications  - Adalimumab () q14 days  -- Blood work every 6 months on adalimumab: CBC, LFTs, CRP, ESR     Standing Lab Orders:   10/5/23  TB: 3/6/20 NEGATIVE  Last complete: 22  Next due: 6 months around May 2023    Refills sent to [facility] to cover until [facility].  Sent reminder via portal message to ask for new prescription at RV.      CLINIC COORDINATORS,  Please call pt to schedule Return/follow up visit in 2023 with Dr. Escobedo or PA.    Please let RN know when scheduled.    Thank you.  Antonio

## 2022-12-25 ENCOUNTER — HEALTH MAINTENANCE LETTER (OUTPATIENT)
Age: 32
End: 2022-12-25

## 2023-04-16 ENCOUNTER — HEALTH MAINTENANCE LETTER (OUTPATIENT)
Age: 33
End: 2023-04-16

## 2023-06-01 DIAGNOSIS — K51.00 ULCERATIVE PANCOLITIS WITHOUT COMPLICATION (H): ICD-10-CM

## 2023-06-05 NOTE — TELEPHONE ENCOUNTER
"adalimumab (HUMIRA *CF* PEN) 40 MG/0.4ML pen kit      Last Written Prescription Date:  12/9/2022  Last Fill Quantity: 2 each,   # refills: 4  Last Office Visit : 10/4/2022  [facility]  Future Office visit:  8/1/2023    Routing refill request to provider for review/approval because:  Refill needs review- marie refill until seen.  - last ordered comments on 12/9/2022 \"No more refills without an office visit\"  - plan last visit on 10/4/2022 RTC in 6 months  - future visit is now scheduled for 8/1/2023   "

## 2023-06-08 NOTE — TELEPHONE ENCOUNTER
Sent a my chart message to check on when patient is due of next humira. Last labs were November so overdue for monitoring labs   Await response back from pt

## 2023-06-12 RX ORDER — ADALIMUMAB 40MG/0.4ML
40 KIT SUBCUTANEOUS
Qty: 2 EACH | Refills: 1 | OUTPATIENT
Start: 2023-06-12

## 2023-06-13 ENCOUNTER — MYC MEDICAL ADVICE (OUTPATIENT)
Dept: GASTROENTEROLOGY | Facility: CLINIC | Age: 33
End: 2023-06-13
Payer: COMMERCIAL

## 2023-06-13 ENCOUNTER — PATIENT OUTREACH (OUTPATIENT)
Dept: GASTROENTEROLOGY | Facility: CLINIC | Age: 33
End: 2023-06-13
Payer: COMMERCIAL

## 2023-06-13 DIAGNOSIS — K51.00 ULCERATIVE PANCOLITIS WITHOUT COMPLICATION (H): ICD-10-CM

## 2023-06-13 RX ORDER — ADALIMUMAB 40MG/0.4ML
40 KIT SUBCUTANEOUS
Qty: 2 EACH | Refills: 5 | Status: SHIPPED | OUTPATIENT
Start: 2023-06-13 | End: 2023-11-09

## 2023-06-13 NOTE — TELEPHONE ENCOUNTER
Patient last labs were in November  Patient has lab appointment today   Clinic month in one month   Refill his humira today as patient is due today

## 2023-08-28 ASSESSMENT — ENCOUNTER SYMPTOMS
SKIN CHANGES: 0
POOR WOUND HEALING: 0
NAIL CHANGES: 0

## 2023-08-29 ENCOUNTER — VIRTUAL VISIT (OUTPATIENT)
Dept: GASTROENTEROLOGY | Facility: CLINIC | Age: 33
End: 2023-08-29
Payer: COMMERCIAL

## 2023-08-29 DIAGNOSIS — K51.00 ULCERATIVE PANCOLITIS WITHOUT COMPLICATION (H): Primary | ICD-10-CM

## 2023-08-29 PROCEDURE — 99214 OFFICE O/P EST MOD 30 MIN: CPT | Mod: VID | Performed by: PHYSICIAN ASSISTANT

## 2023-08-29 NOTE — PATIENT INSTRUCTIONS
It was a pleasure taking care of you today.  I've included a brief summary of our discussion and care plan from today's visit below.  Please review this information with your primary care provider.  ______________________________________________________________________    My recommendations are summarized as follows:  -- Continue adalimumab every 14 days  -- Blood work every 3 months on adalimumab: CBC, LFTs, CRP, ESR   -- colonoscopy in 2024 with Dr. Escobedo    Return to GI Clinic in 6 months to review your progress.    ______________________________________________________________________    How do I schedule labs, imaging studies, or procedures that were ordered in clinic today?     Labs: To schedule lab appointment at [facility], use my chart or call [phone]. If you have a [place] lab closer to home where you are regularly seen you can give them a call.     Procedures: If a colonoscopy, upper endoscopy, breath test, esophageal manometry, or pH impedence was ordered today, our endoscopy team will call you to schedule this. If you have not heard from our endoscopy team within a week, please call [phone] to schedule.     Imaging Studies: If you were scheduled for a CT scan, X-ray, MRI, ultrasound, HIDA scan or other imaging study, please call [phone] to have this scheduled.     Referral: If a referral to another specialty was ordered, expect a phone call or follow instructions above. If you have not heard from anyone regarding your referral in a week, please call our clinic to check the status.     Who do I call with any questions after my visit?  Please be in touch if there are any further questions that arise following today's visit.  There are multiple ways to contact your gastroenterology care team.      During business hours, you may reach a Gastroenterology nurse at [phone]    To schedule or reschedule an appointment, please call [phone].     You can always send a  secure message through roundCorner.  [employer]Corner messages are answered by your nurse or doctor typically within 24 hours.  Please allow extra time on weekends and holidays.      For urgent/emergent questions after business hours, you may reach the on-call GI Fellow by contacting the [facility]  at [phone].     How will I get the results of any tests ordered?    You will receive all of your results.  If you have signed up for [employer], any tests ordered at your visit will be available to you after your provider reviews them.  Typically this takes 1-2 weeks.  If there are urgent results that require a change in your care plan, your provider or nurse will call you to discuss the next steps.      What is roundCorner?  [employer]Corner is a secure way for you to access all of your healthcare records from the [facility].  It is a web based computer program, so you can sign on to it from any location.  It also allows you to send secure messages to your care team.  I recommend signing up for roundCorner access if you have not already done so and are comfortable with using a computer.      How to I schedule a follow-up visit?  If you did not schedule a follow-up visit today, please call [phone] to schedule a follow-up office visit.      Sincerely,      Miguel Turner PA-C  Division of Gastroenterology, Hepatology and Nutrition  [facility]

## 2023-08-29 NOTE — PROGRESS NOTES
Virtual Visit Details    Type of service:  Video Visit     Originating Location (pt. Location): Home    Distant Location (provider location):  Off-site  Platform used for Video Visit: Salvador    IBD CLINIC VISIT     CC/REFERRING MD:  Self referred  REASON FOR FOLLOW UP: Ulcerative colitis    ASSESSMENT/PLAN  33 year old male with a history of ulcerative pancolitis.     1.  Ulcerative pancolitis:   Current Medications  - Adalimumab (2017) q14 days  Current Clinical Disease Activity: Remission (Maddox score 0)  Last Endoscopic Disease Activity: eMayo 0 in 2/2018    He remains in clinical remission on adalimumab monotherapy every 2 weeks. Labs 6/2023 all within normal limits, due for repeat draw. Plan to continue adalimumab every 14 days for maintenance of remission.     -- Continue adalimumab every 14 days  -- Blood work every 3 months on adalimumab: CBC, LFTs, CRP, ESR - Due Sept     2. Dry skin spots: appears to be isolated eczema or psoriasis spots which can actually be caused by humira. At this not, not overly bothersome, could consider spot treatment with some steroid cream.     3.  Joint pain: Resolved on Humira    4. Elevated liver tests: Suspect related to vigorous exercise, LFTs 1/2022, plan for redraw.    -- Repeat liver tests    IBD dysplasia surveillance: Given pancolitis colonoscopy every 1-3 years recommended after 8 years of disease.  Dysplasia screening is recommended 2024. Order placed with Gregg for next year.    Misc:  -- Avoid tobacco use  -- Avoid NSAIDs as there is potentially a 25% chance of causing an IBD flare    RTC 6 months      Miguel Turner PA-C  Division of Gastroenterology, Hepatology and Nutrition  [facility]     IBD HISTORY  Age at diagnosis: 26, 2017  Extent of disease: Pancolitis  Prior UC surgeries: None  Prior IBD Medications:   -- Lialda - flu like illness with fevers, abdominal pains  -- Apriso - flu like illlness  -- Sulfasalazine - did not control symptoms    DRUG  MONITORING  TPMT enzyme activity: --    6-TGN/6-MMPN levels: --    Biologic concentration:  18 ADA level 12.4, no antibodies (every 14 days, monotherapy)    IBDQ Score Date IBDQ - Total Score  (Higher score better)   10/4/2022   9:02 AM 59   2019   7:22 AM 63   12/3/2018   7:52 AM 63        HPI:   Kyle is here today for follow up. Reports he feels the best best he's ever felt since UC diagnosis.    BM pattern - 1 bowel movement at 8am every day, formed easy to pass. Occasionally second around lunchtime that is looser if eating more vegetables. No blood, no urgency, no night time awakening. No abdominal pain.    No EIM. No perianal symptoms. No fevers, chills, or unintentional weight loss.    Moved to [place]. Has 2 year old and 4 year old and new baby boy!    Maddox score:   Stool freq: 0 (baseline stools frequency)  Rectal bleedin (None)  PGA: 0 (normal)  Endoscopy: --    Remission: <3   Mild disease: 3-5  Moderate disease: 6-10  Severe disease: >10    ROS:  Complete 10 System ROS performed. All are negative except as documented below, in the HPI, or in patient questionnaire from today's visit.  No fevers or chills  No weight loss  No blurry vision, double vision or change in vision  No sore throat  No lymphadenopathy  No headache, paraesthesias, or weakness in a limb  No shortness of breath or wheezing  No chest pain or pressure  No arthralgias or myalgias  No rashes or skin changes  No odynophagia or dysphagia  No BRBPR, hematochezia, melena  No dysuria, frequency or urgency  No hot/cold intolerance or polyria  No anxiety or depression    Extra intestinal manifestations of IBD:  No uveitis/episcleritis  No aphthous ulcers   No arthritis   No erythema nodosum/pyoderma gangrenosum.     PERTINENT PAST MEDICAL HISTORY:  See HPI    PREVIOUS SURGERIES:    ALLERGIES:   No Known Allergies    PERTINENT MEDICATIONS:    Current Outpatient Medications:     adalimumab (HUMIRA *CF* PEN) 40 MG/0.4ML pen kit, Inject  0.4 mLs (40 mg) Subcutaneous every 14 days, Disp: 2 each, Rfl: 5    SOCIAL HISTORY:  Social History     Socioeconomic History    Marital status:      Spouse name: Not on file    Number of children: Not on file    Years of education: Not on file    Highest education level: Not on file   Occupational History    Not on file   Tobacco Use    Smoking status: Never    Smokeless tobacco: Never   Vaping Use    Vaping Use: Never used   Substance and Sexual Activity    Alcohol use: No    Drug use: No    Sexual activity: Yes     Partners: Female     Birth control/protection: Condom   Other Topics Concern    Parent/sibling w/ CABG, MI or angioplasty before 65F 55M? No   Social History Narrative    Not on file     Social Determinants of Health     Financial Resource Strain: Not on file   Food Insecurity: Not on file   Transportation Needs: Not on file   Physical Activity: Not on file   Stress: Not on file   Social Connections: Not on file   Intimate Partner Violence: Not on file   Housing Stability: Not on file     FAMILY HISTORY:  No CRC, no IBD  Past/family/social history reviewed and no changes    PHYSICAL EXAMINATION:  Constitutional: aaox3, cooperative, pleasant, not dyspneic/diaphoretic, no acute distress  Vitals reviewed: There were no vitals taken for this visit.  Wt:   Wt Readings from Last 2 Encounters:   02/02/22 99.8 kg (220 lb)   09/27/21 102.1 kg (225 lb)      General appearance:  Healthy appearing adult, in no acute distress  Eyes: Sclera anicteric, Pupils round and reactive to light  Ears, nose, mouth and throat: No obvious external lesions of ears and nose, Hearing intact  Neck: Symmetric, No obvious external lesions  Respiratory: Normal respiration, no use of accessory muscles   Skin: No rashes or jaundice   Psychiatric: Oriented to person, place and time, Appropriate mood and affect.

## 2023-08-29 NOTE — LETTER
8/29/2023         RE: Kyle Coelho  [address]  [place] [address]        Dear Colleague,    Thank you for referring your patient, Kyle Coelho, to the [facility] [place]. Please see a copy of my visit note below.    IBD CLINIC VISIT     CC/REFERRING MD:  Self referred  REASON FOR FOLLOW UP: Ulcerative colitis    ASSESSMENT/PLAN  33 year old male with a history of ulcerative pancolitis.     1.  Ulcerative pancolitis:   Current Medications  - Adalimumab (2017) q14 days  Current Clinical Disease Activity: Remission (Maddox score 0)  Last Endoscopic Disease Activity: eMayo 0 in 2/2018    He remains in clinical remission on adalimumab monotherapy every 2 weeks. Labs 6/2023 all within normal limits, due for repeat draw. Plan to continue adalimumab every 14 days for maintenance of remission.     -- Continue adalimumab every 14 days  -- Blood work every 3 months on adalimumab: CBC, LFTs, CRP, ESR - Due Sept     2. Dry skin spots: appears to be isolated eczema or psoriasis spots which can actually be caused by humira. At this not, not overly bothersome, could consider spot treatment with some steroid cream.     3.  Joint pain: Resolved on Humira    4. Elevated liver tests: Suspect related to vigorous exercise, LFTs 1/2022, plan for redraw.    -- Repeat liver tests    IBD dysplasia surveillance: Given pancolitis colonoscopy every 1-3 years recommended after 8 years of disease.  Dysplasia screening is recommended 2024. Order placed with Gregg for next year.    Misc:  -- Avoid tobacco use  -- Avoid NSAIDs as there is potentially a 25% chance of causing an IBD flare    RTC 6 months      Miugel Turner PA-C  Division of Gastroenterology, Hepatology and Nutrition  [facility]     IBD HISTORY  Age at diagnosis: 26, 2017  Extent of disease: Pancolitis  Prior UC surgeries: None  Prior IBD Medications:   -- Lialda - flu like illness with fevers, abdominal pains  --  Apriso - flu like illlness  -- Sulfasalazine - did not control symptoms    DRUG MONITORING  TPMT enzyme activity: --    6-TGN/6-MMPN levels: --    Biologic concentration:  18 ADA level 12.4, no antibodies (every 14 days, monotherapy)    IBDQ Score Date IBDQ - Total Score  (Higher score better)   10/4/2022   9:02 AM 59   2019   7:22 AM 63   12/3/2018   7:52 AM 63        HPI:   Kyle is here today for follow up. Reports he feels the best best he's ever felt since UC diagnosis.    BM pattern - 1 bowel movement at 8am every day, formed easy to pass. Occasionally second around lunchtime that is looser if eating more vegetables. No blood, no urgency, no night time awakening. No abdominal pain.    No EIM. No perianal symptoms. No fevers, chills, or unintentional weight loss.    Moved to [place]. Has 2 year old and 4 year old and new baby boy!    Maddox score:   Stool freq: 0 (baseline stools frequency)  Rectal bleedin (None)  PGA: 0 (normal)  Endoscopy: --    Remission: <3   Mild disease: 3-5  Moderate disease: 6-10  Severe disease: >10    ROS:  Complete 10 System ROS performed. All are negative except as documented below, in the HPI, or in patient questionnaire from today's visit.  No fevers or chills  No weight loss  No blurry vision, double vision or change in vision  No sore throat  No lymphadenopathy  No headache, paraesthesias, or weakness in a limb  No shortness of breath or wheezing  No chest pain or pressure  No arthralgias or myalgias  No rashes or skin changes  No odynophagia or dysphagia  No BRBPR, hematochezia, melena  No dysuria, frequency or urgency  No hot/cold intolerance or polyria  No anxiety or depression    Extra intestinal manifestations of IBD:  No uveitis/episcleritis  No aphthous ulcers   No arthritis   No erythema nodosum/pyoderma gangrenosum.     PERTINENT PAST MEDICAL HISTORY:  See HPI    PREVIOUS SURGERIES:    ALLERGIES:   No Known Allergies    PERTINENT MEDICATIONS:    Current  Outpatient Medications:     adalimumab (HUMIRA *CF* PEN) 40 MG/0.4ML pen kit, Inject 0.4 mLs (40 mg) Subcutaneous every 14 days, Disp: 2 each, Rfl: 5    SOCIAL HISTORY:  Social History     Socioeconomic History    Marital status:      Spouse name: Not on file    Number of children: Not on file    Years of education: Not on file    Highest education level: Not on file   Occupational History    Not on file   Tobacco Use    Smoking status: Never    Smokeless tobacco: Never   Vaping Use    Vaping Use: Never used   Substance and Sexual Activity    Alcohol use: No    Drug use: No    Sexual activity: Yes     Partners: Female     Birth control/protection: Condom   Other Topics Concern    Parent/sibling w/ CABG, MI or angioplasty before 65F 55M? No   Social History Narrative    Not on file     Social Determinants of Health     Financial Resource Strain: Not on file   Food Insecurity: Not on file   Transportation Needs: Not on file   Physical Activity: Not on file   Stress: Not on file   Social Connections: Not on file   Intimate Partner Violence: Not on file   Housing Stability: Not on file     FAMILY HISTORY:  No CRC, no IBD  Past/family/social history reviewed and no changes    PHYSICAL EXAMINATION:  Constitutional: aaox3, cooperative, pleasant, not dyspneic/diaphoretic, no acute distress  Vitals reviewed: There were no vitals taken for this visit.  Wt:   Wt Readings from Last 2 Encounters:   02/02/22 99.8 kg (220 lb)   09/27/21 102.1 kg (225 lb)      General appearance:  Healthy appearing adult, in no acute distress  Eyes: Sclera anicteric, Pupils round and reactive to light  Ears, nose, mouth and throat: No obvious external lesions of ears and nose, Hearing intact  Neck: Symmetric, No obvious external lesions  Respiratory: Normal respiration, no use of accessory muscles   Skin: No rashes or jaundice   Psychiatric: Oriented to person, place and time, Appropriate mood and affect.       Again, thank you for allowing  me to participate in the care of your patient.      Sincerely,    Miguel Turner PA-C

## 2023-08-29 NOTE — NURSING NOTE
Is the patient currently in the state of [place]? YES    Visit mode:VIDEO    If the visit is dropped, the patient can be reconnected by: VIDEO VISIT: Text to cell phone:   Telephone Information:   Mobile [phone]       Will anyone else be joining the visit? NO  (If patient encounters technical issues they should call [phone]    How would you like to obtain your AVS? MyChart    Are changes needed to the allergy or medication list? No    Reason for visit: RECHECK    Dk CHRISTIE

## 2023-09-10 ENCOUNTER — TELEPHONE (OUTPATIENT)
Dept: GASTROENTEROLOGY | Facility: CLINIC | Age: 33
End: 2023-09-10
Payer: COMMERCIAL

## 2023-09-10 NOTE — TELEPHONE ENCOUNTER
PA Initiation    Medication: HUMIRA *CF* PEN 40 MG/0.4ML SC PNKT  Insurance Company: [employer] - Phone [phone] Fax [phone]  Pharmacy Filling the Rx: [facility] [place]  Filling Pharmacy Phone:    Filling Pharmacy Fax:    Start Date: 9/10/2023    Received question set via fax for renewal. Completed and faxed with chart notes.

## 2023-09-11 NOTE — TELEPHONE ENCOUNTER
Prior Authorization Approval    Medication: HUMIRA *CF* PEN 40 MG/0.4ML SC PNKT  Authorization Effective Date: 9/11/2023  Authorization Expiration Date: 9/11/2024  Approved Dose/Quantity: 2 pens per 28 days  Reference #:     Insurance Company: [employer] - Phone [phone] Fax [phone]  Expected CoPay:       CoPay Card Available:      Financial Assistance Needed:   Which Pharmacy is filling the prescription: [facility] [place]  Pharmacy Notified: Yes  Patient Notified: Yes

## 2023-11-07 DIAGNOSIS — K51.00 ULCERATIVE PANCOLITIS WITHOUT COMPLICATION (H): ICD-10-CM

## 2023-11-09 RX ORDER — ADALIMUMAB 40MG/0.4ML
KIT SUBCUTANEOUS
Qty: 2 EACH | Refills: 0 | Status: SHIPPED | OUTPATIENT
Start: 2023-11-09 | End: 2023-12-06

## 2023-11-09 NOTE — TELEPHONE ENCOUNTER
adalimumab (HUMIRA *CF* PEN) 40 MG/0.4ML pen kit   Last Written Prescription Date:  6-13-23  Last Fill Quantity: 2 ,   # refills: 5  Last Office Visit: 8-29-23  Future Office visit:  2-6-24    Outside labs:9-20-23   Hepatic function  ESR,CRP, CBC/PLT    RF2 EACH:0

## 2023-12-04 DIAGNOSIS — K51.00 ULCERATIVE PANCOLITIS WITHOUT COMPLICATION (H): ICD-10-CM

## 2023-12-07 RX ORDER — ADALIMUMAB 40MG/0.4ML
KIT SUBCUTANEOUS
Qty: 2 EACH | Refills: 0 | Status: SHIPPED | OUTPATIENT
Start: 2023-12-07 | End: 2024-01-26

## 2023-12-07 NOTE — TELEPHONE ENCOUNTER
adalimumab (HUMIRA *CF* PEN) 40 MG/0.4ML pen kit       Last Written Prescription Date:11-9-23  Last Fill Quantity: 2 each,   # refills: 0  Last Office Visit: 8-29-23  Future Office visit:  2-6-24    Outside labs:9-20-23   Hepatic function  ESR,CRP, CBC/PLT    RF 2 each:0

## 2024-01-03 DIAGNOSIS — K51.00 ULCERATIVE PANCOLITIS WITHOUT COMPLICATION (H): ICD-10-CM

## 2024-01-09 ENCOUNTER — MYC MEDICAL ADVICE (OUTPATIENT)
Dept: GASTROENTEROLOGY | Facility: CLINIC | Age: 34
End: 2024-01-09
Payer: COMMERCIAL

## 2024-01-09 DIAGNOSIS — K51.00 ULCERATIVE PANCOLITIS WITHOUT COMPLICATION (H): ICD-10-CM

## 2024-01-09 NOTE — TELEPHONE ENCOUNTER
adalimumab (HUMIRA *CF* PEN) 40 MG/0.4ML   Last Written Prescription Date:  12/7/23  Last Fill Quantity: 2,   # refills: 0  Last Office Visit : 9/29/23  Future Office visit:  2/6/24  Routing refill request to provider for review/approval because:  REFILL  PENDING  OVER DUE LABS   LABS  CBC  CRP  ESR  HEP PANEL  9/20/23 (outside record)         complains of pain/discomfort

## 2024-01-11 ENCOUNTER — DOCUMENTATION ONLY (OUTPATIENT)
Dept: GASTROENTEROLOGY | Facility: CLINIC | Age: 34
End: 2024-01-11
Payer: COMMERCIAL

## 2024-01-11 NOTE — PROGRESS NOTES
[facility] standing lab orders    Provider: Miguel ROTH      Where:   [facility]  Fax: [phone]    Lab(s):   -ESR  -CRP  -CBC  -CMP    Confirmed order received: yes via care everywhere.    Results back and sent to HIM to scan into patient chart.    Kathe Gutierrez LPN

## 2024-01-16 RX ORDER — ADALIMUMAB 40MG/0.4ML
KIT SUBCUTANEOUS
Qty: 2 EACH | Refills: 0 | OUTPATIENT
Start: 2024-01-16

## 2024-01-26 RX ORDER — ADALIMUMAB 40MG/0.4ML
KIT SUBCUTANEOUS
Qty: 2 EACH | Refills: 0 | Status: SHIPPED | OUTPATIENT
Start: 2024-01-26 | End: 2024-02-29

## 2024-01-30 ASSESSMENT — ENCOUNTER SYMPTOMS
SINUS PAIN: 0
HOARSE VOICE: 0
SKIN CHANGES: 0
SORE THROAT: 0
TASTE DISTURBANCE: 0
TROUBLE SWALLOWING: 0
NAIL CHANGES: 0
POOR WOUND HEALING: 0
NECK MASS: 0
SMELL DISTURBANCE: 0
SINUS CONGESTION: 0

## 2024-02-06 ENCOUNTER — VIRTUAL VISIT (OUTPATIENT)
Dept: GASTROENTEROLOGY | Facility: CLINIC | Age: 34
End: 2024-02-06
Payer: COMMERCIAL

## 2024-02-06 ENCOUNTER — PATIENT OUTREACH (OUTPATIENT)
Dept: GASTROENTEROLOGY | Facility: CLINIC | Age: 34
End: 2024-02-06

## 2024-02-06 VITALS — WEIGHT: 210 LBS | HEIGHT: 73 IN | BODY MASS INDEX: 27.83 KG/M2

## 2024-02-06 DIAGNOSIS — K51.00 ULCERATIVE PANCOLITIS WITHOUT COMPLICATION (H): Primary | ICD-10-CM

## 2024-02-06 PROCEDURE — 99214 OFFICE O/P EST MOD 30 MIN: CPT | Mod: 95 | Performed by: PHYSICIAN ASSISTANT

## 2024-02-06 ASSESSMENT — PAIN SCALES - GENERAL: PAINLEVEL: NO PAIN (0)

## 2024-02-06 NOTE — PATIENT INSTRUCTIONS
It was a pleasure taking care of you today.  I've included a brief summary of our discussion and care plan from today's visit below.  Please review this information with your primary care provider.  ______________________________________________________________________    My recommendations are summarized as follows:  -- Continue adalimumab every 14 days  -- Blood work every 3 months on adalimumab: CBC, LFTs, CRP, ESR   -- colonoscopy in 2024 with Dr. Escobedo    To learn more about Diet and Nutrition in the setting of IBD, check out some of these resources:  https://www.crohnscolitisfoundation.org/diet-and-nutrition/what-should-i-eat  https://www.nimbal.org/ (mSCD)  https://ntforibd.org/ (SCD, mSCD, Autoimmune protocol, IBD-AID, CDED diets with inclusion/exclusion charts)  https://Berst.org/ (mediterranean diet)  https://www.[facility].edu/nutrition/ibd/ibdaid/ (IBD-AID)    Return to GI Clinic in 6 months to review your progress.    ______________________________________________________________________    How do I schedule labs, imaging studies, or procedures that were ordered in clinic today?     Labs: To schedule lab appointment at the [facility], use my chart or call [phone]. If you have a [facility] lab closer to home where you are regularly seen you can give them a call.     Procedures: If a colonoscopy, upper endoscopy, breath test, esophageal manometry, or pH impedence was ordered today, our endoscopy team will call you to schedule this. If you have not heard from our endoscopy team within a week, please call [phone] to schedule.     Imaging Studies: If you were scheduled for a CT scan, X-ray, MRI, ultrasound, HIDA scan or other imaging study, please call [phone] to have this scheduled.     Referral: If a referral to another specialty was ordered, expect a phone call or follow instructions above. If you have not heard from anyone regarding your referral in a week, please call  our clinic to check the status.     Who do I call with any questions after my visit?  Please be in touch if there are any further questions that arise following today's visit.  There are multiple ways to contact your gastroenterology care team.      During business hours, you may reach a Gastroenterology nurse at [phone]    To schedule or reschedule an appointment, please call [phone].     You can always send a secure message through [employer].  [employer] messages are answered by your nurse or doctor typically within 24 hours.  Please allow extra time on weekends and holidays.      For urgent/emergent questions after business hours, you may reach the on-call GI Fellow by contacting the [facility]  at [phone].     How will I get the results of any tests ordered?    You will receive all of your results.  If you have signed up for [employer], any tests ordered at your visit will be available to you after your provider reviews them.  Typically this takes 1-2 weeks.  If there are urgent results that require a change in your care plan, your provider or nurse will call you to discuss the next steps.      What is [employer]?  [employer] is a secure way for you to access all of your healthcare records from the [facility].  It is a web based computer program, so you can sign on to it from any location.  It also allows you to send secure messages to your care team.  I recommend signing up for [employer] access if you have not already done so and are comfortable with using a computer.      How to I schedule a follow-up visit?  If you did not schedule a follow-up visit today, please call [phone] to schedule a follow-up office visit.      Sincerely,      Miguel Turner PA-C  Division of Gastroenterology, Hepatology and Nutrition  [facility]

## 2024-02-06 NOTE — NURSING NOTE
Is the patient currently in the state of [place]? YES    Visit mode:VIDEO    If the visit is dropped, the patient can be reconnected by: VIDEO VISIT: Send to e-mail at: [email or web address]    Will anyone else be joining the visit? NO  (If patient encounters technical issues they should call [phone]    How would you like to obtain your AVS? MyChart    Are changes needed to the allergy or medication list? No    Reason for visit: SUYAPA CHRISTIE

## 2024-02-06 NOTE — LETTER
2/6/2024         RE: Kyle Coelho  [address]  [place] [address]        Dear Colleague,    Thank you for referring your patient, Kyle Coelho, to the [facility]STRO[facility] [place]. Please see a copy of my visit note below.    IBD CLINIC VISIT     CC/REFERRING MD:  Self referred  REASON FOR FOLLOW UP: Ulcerative colitis    ASSESSMENT/PLAN  33 year old male with a history of ulcerative pancolitis.     1.  Ulcerative pancolitis:   Current Medications  - Adalimumab (2017) q14 days  Current Clinical Disease Activity: Remission (Maddox score 0)  Last Endoscopic Disease Activity: eMayo 0 in 2/2018    He remains in clinical remission on adalimumab monotherapy every 2 weeks, however has developed psoriasis that has not been able to be controlled with topical therapies, prompting a switch in therapies. We discussed vedolizumab and stelara. We will proceed with stelara. Risks and benefits discussed and plans to meet with MTM later this month.  Labs 1/2024 all within normal limits. We will get a new baseline fecal michael now and do a colonoscopy this summer to ensure mucosal healing on Stelara.     -- Start PA for Stelara  -- Fecal michael now  -- Colonoscopy in 6 months after switching to stelara  --Labs to include CBC, LFTs, CRP every 3 months.  -- Meet with MTM    2. Dry skin spots: appears to be isolated eczema or psoriasis spots which can actually be caused by humira. Switching to Stelara now    3.  Joint pain: Resolved on Humira, will monitor on stelara and ensure IBD is in good control    4. Elevated liver tests: Suspect related to vigorous exercise, now normalized    IBD dysplasia surveillance: Given pancolitis colonoscopy every 1-3 years recommended after 8 years of disease.  Dysplasia screening is recommended 2024, order placed    Misc:  -- Avoid tobacco use  -- Avoid NSAIDs as there is potentially a 25% chance of causing an IBD flare    RTC 6 months    Miguel Turner,  TRICIA  Division of Gastroenterology, Hepatology and Nutrition  [facility]     IBD HISTORY  Age at diagnosis: 2017  Extent of disease: Pancolitis  Prior UC surgeries: None  Prior IBD Medications:   -- Lialda - flu like illness with fevers, abdominal pains  -- Apriso - flu like illlness  -- Sulfasalazine - did not control symptoms    DRUG MONITORING  TPMT enzyme activity: --    6-TGN/6-MMPN levels: --    Biologic concentration:  18 ADA level 12.4, no antibodies (every 14 days, monotherapy)    IBDQ Score Date IBDQ - Total Score  (Higher score better)   2024   9:13 AM 59   10/4/2022   9:02 AM 59   2019   7:22 AM 63   12/3/2018   7:52 AM 63      HPI:   Kyle is here today for follow up. Overall feeling well specifically from a GI standpoint. Unfortunately he has developed psoriasis that has progressed despite establishing with derma and trying topical therapies. At this time he is interested in changing therapies.     BM pattern - 1 bowel movement at 8am every day, formed easy to pass. Occasionally second around lunchtime that is looser if eating more vegetables. No blood, no urgency, no night time awakening. No abdominal pain.    No perianal symptoms. No fevers, chills, or unintentional weight loss.    Moved to [place]. Has 2 year old and 4 year old and new baby boy!    Maddox score:   Stool freq: 0 (baseline stools frequency)  Rectal bleedin (None)  PGA: 0 (normal)  Endoscopy: --    Remission: <3   Mild disease: 3-5  Moderate disease: 6-10  Severe disease: >10    ROS:  Complete 10 System ROS performed. All are negative except as documented below, in the HPI, or in patient questionnaire from today's visit.  No fevers or chills  No weight loss  No blurry vision, double vision or change in vision  No sore throat  No lymphadenopathy  No headache, paraesthesias, or weakness in a limb  No shortness of breath or wheezing  No chest pain or pressure  No arthralgias or myalgias  No rashes or skin  "changes  No odynophagia or dysphagia  No BRBPR, hematochezia, melena  No dysuria, frequency or urgency  No hot/cold intolerance or polyria  No anxiety or depression    Extra intestinal manifestations of IBD:  No uveitis/episcleritis  No aphthous ulcers   No arthritis   No erythema nodosum/pyoderma gangrenosum.     PERTINENT PAST MEDICAL HISTORY:  See HPI    PREVIOUS SURGERIES:    ALLERGIES:   No Known Allergies    PERTINENT MEDICATIONS:    Current Outpatient Medications:     adalimumab (HUMIRA *CF* PEN) 40 MG/0.4ML pen kit, Route: Inject 0.4 mLs (40 mg) Subcutaneous every 14 days - Subcutaneous. Labs due for refills., Disp: 2 each, Rfl: 0    SOCIAL HISTORY:  Social History     Socioeconomic History    Marital status:      Spouse name: Not on file    Number of children: Not on file    Years of education: Not on file    Highest education level: Not on file   Occupational History    Not on file   Tobacco Use    Smoking status: Never    Smokeless tobacco: Never   Vaping Use    Vaping Use: Never used   Substance and Sexual Activity    Alcohol use: No    Drug use: No    Sexual activity: Yes     Partners: Female     Birth control/protection: Condom   Other Topics Concern    Parent/sibling w/ CABG, MI or angioplasty before 65F 55M? No   Social History Narrative    Not on file     Social Determinants of Health     Financial Resource Strain: Not on file   Food Insecurity: Not on file   Transportation Needs: Not on file   Physical Activity: Not on file   Stress: Not on file   Social Connections: Not on file   Interpersonal Safety: Not on file   Housing Stability: Not on file     FAMILY HISTORY:  No CRC, no IBD  Past/family/social history reviewed and no changes    PHYSICAL EXAMINATION:  Constitutional: aaox3, cooperative, pleasant, not dyspneic/diaphoretic, no acute distress  Vitals reviewed: Ht 1.854 m (6' 1\")   Wt 95.3 kg (210 lb)   BMI 27.71 kg/m    Wt:   Wt Readings from Last 2 Encounters:   02/06/24 95.3 kg (210 " lb)   02/02/22 99.8 kg (220 lb)      General appearance:  Healthy appearing adult, in no acute distress  Eyes: Sclera anicteric, Pupils round and reactive to light  Ears, nose, mouth and throat: No obvious external lesions of ears and nose, Hearing intact  Neck: Symmetric, No obvious external lesions  Respiratory: Normal respiration, no use of accessory muscles   Skin: No rashes or jaundice   Psychiatric: Oriented to person, place and time, Appropriate mood and affect.   Answers submitted by the patient for this visit:  Symptoms you have experienced in the last 30 days (Submitted on 1/30/2024)  General Symptoms: No  Skin Symptoms: Yes  HENT Symptoms: Yes  EYE SYMPTOMS: No  HEART SYMPTOMS: No  LUNG SYMPTOMS: No  INTESTINAL SYMPTOMS: No  URINARY SYMPTOMS: No  REPRODUCTIVE SYMPTOMS: No  SKELETAL SYMPTOMS: No  BLOOD SYMPTOMS: No  NERVOUS SYSTEM SYMPTOMS: No  MENTAL HEALTH SYMPTOMS: No  Please answer the questions below to tell us what conditions you are experiencing: (Submitted on 1/30/2024)  Ear pain: No  Ear discharge: No  Hearing loss: No  Tinnitus: No  Nosebleeds: No  Congestion: No  Sinus pain: No  Trouble swallowing: No   Voice hoarseness: No  Mouth sores: No  Sore throat: No  Tooth pain: No  Gum tenderness: No  Bleeding gums: No  Change in taste: No  Change in sense of smell: No  Dry mouth: No  Hearing aid used: No  Neck lump: No   (Submitted on 1/30/2024)  Changes in hair: No  Changes in moles/birth marks: No  Itching: Yes  Rashes: No  Changes in nails: No  Acne: No  Change in facial hair: No  Warts: No  Non-healing sores: No  Scarring: No  Flaking of skin: Yes  Color changes of hands/feet in cold : No  Sun sensitivity: No  Skin thickening: No          Again, thank you for allowing me to participate in the care of your patient.      Sincerely,    Miguel Turner PA-C

## 2024-02-06 NOTE — PROGRESS NOTES
[facility] GASTRO CLINIC SUPPORT,  Please fax quant tb and fcal lab order to    [facility]  [address], [place] [address] (S) [phone]    Please call [facility] to get the fax # and fax the fcal lab orders.    Please confirm receipt and let pt know via MyC.    Please follow up for result.    Thank you.  Antonio

## 2024-02-06 NOTE — PROGRESS NOTES
Virtual Visit Details    Type of service:  Video Visit     Originating Location (pt. Location): Home    Distant Location (provider location):  Off-site  Platform used for Video Visit: Salvador    IBD CLINIC VISIT     CC/REFERRING MD:  Self referred  REASON FOR FOLLOW UP: Ulcerative colitis    ASSESSMENT/PLAN  33 year old male with a history of ulcerative pancolitis.     1.  Ulcerative pancolitis:   Current Medications  - Adalimumab (2017) q14 days  Current Clinical Disease Activity: Remission (Maddox score 0)  Last Endoscopic Disease Activity: eMayo 0 in 2/2018    He remains in clinical remission on adalimumab monotherapy every 2 weeks, however has developed psoriasis that has not been able to be controlled with topical therapies, prompting a switch in therapies. We discussed vedolizumab and stelara. We will proceed with stelara. Risks and benefits discussed and plans to meet with MTM later this month.  Labs 1/2024 all within normal limits. We will get a new baseline fecal michael now and do a colonoscopy this summer to ensure mucosal healing on Stelara.     -- Start PA for Stelara  -- Fecal michael now  -- Colonoscopy in 6 months after switching to stelara  --Labs to include CBC, LFTs, CRP every 3 months.  -- Meet with MTM    2. Dry skin spots: appears to be isolated eczema or psoriasis spots which can actually be caused by humira. Switching to Stelara now    3.  Joint pain: Resolved on Humira, will monitor on stelara and ensure IBD is in good control    4. Elevated liver tests: Suspect related to vigorous exercise, now normalized    IBD dysplasia surveillance: Given pancolitis colonoscopy every 1-3 years recommended after 8 years of disease.  Dysplasia screening is recommended 2024, order placed    Misc:  -- Avoid tobacco use  -- Avoid NSAIDs as there is potentially a 25% chance of causing an IBD flare    RTC 6 months    Miguel Turner PAKarleeC  Division of Gastroenterology, Hepatology and Nutrition  [facility]      IBD HISTORY  Age at diagnosis: 2017  Extent of disease: Pancolitis  Prior UC surgeries: None  Prior IBD Medications:   -- Lialda - flu like illness with fevers, abdominal pains  -- Apriso - flu like illlness  -- Sulfasalazine - did not control symptoms    DRUG MONITORING  TPMT enzyme activity: --    6-TGN/6-MMPN levels: --    Biologic concentration:  18 ADA level 12.4, no antibodies (every 14 days, monotherapy)    IBDQ Score Date IBDQ - Total Score  (Higher score better)   2024   9:13 AM 59   10/4/2022   9:02 AM 59   2019   7:22 AM 63   12/3/2018   7:52 AM 63      HPI:   Kyle is here today for follow up. Overall feeling well specifically from a GI standpoint. Unfortunately he has developed psoriasis that has progressed despite establishing with derma and trying topical therapies. At this time he is interested in changing therapies.     BM pattern - 1 bowel movement at 8am every day, formed easy to pass. Occasionally second around lunchtime that is looser if eating more vegetables. No blood, no urgency, no night time awakening. No abdominal pain.    No perianal symptoms. No fevers, chills, or unintentional weight loss.    Moved to [place]. Has 2 year old and 4 year old and new baby boy!    Maddox score:   Stool freq: 0 (baseline stools frequency)  Rectal bleedin (None)  PGA: 0 (normal)  Endoscopy: --    Remission: <3   Mild disease: 3-5  Moderate disease: 6-10  Severe disease: >10    ROS:  Complete 10 System ROS performed. All are negative except as documented below, in the HPI, or in patient questionnaire from today's visit.  No fevers or chills  No weight loss  No blurry vision, double vision or change in vision  No sore throat  No lymphadenopathy  No headache, paraesthesias, or weakness in a limb  No shortness of breath or wheezing  No chest pain or pressure  No arthralgias or myalgias  No rashes or skin changes  No odynophagia or dysphagia  No BRBPR, hematochezia, melena  No dysuria,  "frequency or urgency  No hot/cold intolerance or polyria  No anxiety or depression    Extra intestinal manifestations of IBD:  No uveitis/episcleritis  No aphthous ulcers   No arthritis   No erythema nodosum/pyoderma gangrenosum.     PERTINENT PAST MEDICAL HISTORY:  See HPI    PREVIOUS SURGERIES:    ALLERGIES:   No Known Allergies    PERTINENT MEDICATIONS:    Current Outpatient Medications:     adalimumab (HUMIRA *CF* PEN) 40 MG/0.4ML pen kit, Route: Inject 0.4 mLs (40 mg) Subcutaneous every 14 days - Subcutaneous. Labs due for refills., Disp: 2 each, Rfl: 0    SOCIAL HISTORY:  Social History     Socioeconomic History    Marital status:      Spouse name: Not on file    Number of children: Not on file    Years of education: Not on file    Highest education level: Not on file   Occupational History    Not on file   Tobacco Use    Smoking status: Never    Smokeless tobacco: Never   Vaping Use    Vaping Use: Never used   Substance and Sexual Activity    Alcohol use: No    Drug use: No    Sexual activity: Yes     Partners: Female     Birth control/protection: Condom   Other Topics Concern    Parent/sibling w/ CABG, MI or angioplasty before 65F 55M? No   Social History Narrative    Not on file     Social Determinants of Health     Financial Resource Strain: Not on file   Food Insecurity: Not on file   Transportation Needs: Not on file   Physical Activity: Not on file   Stress: Not on file   Social Connections: Not on file   Interpersonal Safety: Not on file   Housing Stability: Not on file     FAMILY HISTORY:  No CRC, no IBD  Past/family/social history reviewed and no changes    PHYSICAL EXAMINATION:  Constitutional: aaox3, cooperative, pleasant, not dyspneic/diaphoretic, no acute distress  Vitals reviewed: Ht 1.854 m (6' 1\")   Wt 95.3 kg (210 lb)   BMI 27.71 kg/m    Wt:   Wt Readings from Last 2 Encounters:   02/06/24 95.3 kg (210 lb)   02/02/22 99.8 kg (220 lb)      General appearance:  Healthy appearing adult, " in no acute distress  Eyes: Sclera anicteric, Pupils round and reactive to light  Ears, nose, mouth and throat: No obvious external lesions of ears and nose, Hearing intact  Neck: Symmetric, No obvious external lesions  Respiratory: Normal respiration, no use of accessory muscles   Skin: No rashes or jaundice   Psychiatric: Oriented to person, place and time, Appropriate mood and affect.   Answers submitted by the patient for this visit:  Symptoms you have experienced in the last 30 days (Submitted on 1/30/2024)  General Symptoms: No  Skin Symptoms: Yes  HENT Symptoms: Yes  EYE SYMPTOMS: No  HEART SYMPTOMS: No  LUNG SYMPTOMS: No  INTESTINAL SYMPTOMS: No  URINARY SYMPTOMS: No  REPRODUCTIVE SYMPTOMS: No  SKELETAL SYMPTOMS: No  BLOOD SYMPTOMS: No  NERVOUS SYSTEM SYMPTOMS: No  MENTAL HEALTH SYMPTOMS: No  Please answer the questions below to tell us what conditions you are experiencing: (Submitted on 1/30/2024)  Ear pain: No  Ear discharge: No  Hearing loss: No  Tinnitus: No  Nosebleeds: No  Congestion: No  Sinus pain: No  Trouble swallowing: No   Voice hoarseness: No  Mouth sores: No  Sore throat: No  Tooth pain: No  Gum tenderness: No  Bleeding gums: No  Change in taste: No  Change in sense of smell: No  Dry mouth: No  Hearing aid used: No  Neck lump: No   (Submitted on 1/30/2024)  Changes in hair: No  Changes in moles/birth marks: No  Itching: Yes  Rashes: No  Changes in nails: No  Acne: No  Change in facial hair: No  Warts: No  Non-healing sores: No  Scarring: No  Flaking of skin: Yes  Color changes of hands/feet in cold : No  Sun sensitivity: No  Skin thickening: No

## 2024-02-07 DIAGNOSIS — K51.00 ULCERATIVE PANCOLITIS WITHOUT COMPLICATION (H): ICD-10-CM

## 2024-02-07 RX ORDER — METHYLPREDNISOLONE SODIUM SUCCINATE 125 MG/2ML
125 INJECTION, POWDER, LYOPHILIZED, FOR SOLUTION INTRAMUSCULAR; INTRAVENOUS
Start: 2024-02-07

## 2024-02-07 RX ORDER — DIPHENHYDRAMINE HYDROCHLORIDE 50 MG/ML
50 INJECTION INTRAMUSCULAR; INTRAVENOUS
Start: 2024-02-07

## 2024-02-07 RX ORDER — ALBUTEROL SULFATE 90 UG/1
1-2 AEROSOL, METERED RESPIRATORY (INHALATION)
Start: 2024-02-07

## 2024-02-07 RX ORDER — ALBUTEROL SULFATE 0.83 MG/ML
2.5 SOLUTION RESPIRATORY (INHALATION)
OUTPATIENT
Start: 2024-02-07

## 2024-02-07 RX ORDER — MEPERIDINE HYDROCHLORIDE 25 MG/ML
25 INJECTION INTRAMUSCULAR; INTRAVENOUS; SUBCUTANEOUS EVERY 30 MIN PRN
OUTPATIENT
Start: 2024-02-07

## 2024-02-07 RX ORDER — EPINEPHRINE 1 MG/ML
0.3 INJECTION, SOLUTION, CONCENTRATE INTRAVENOUS EVERY 5 MIN PRN
OUTPATIENT
Start: 2024-02-07

## 2024-02-07 NOTE — PROGRESS NOTES
RV:  Not scheduled (RTN 6 months)  LV: 1/6/24    Last TB:  Verify TB status: NEGATIVE 3/6/20    New Therapy Plan:  Stelara 520mg induction dose    Order Standing Labs: CBC, CRP, Hep Panel  Order TB/Hepatitis B series:  yes  Order MTM referral:  Has appt on 2/29/24  Wt: 95.3 kg (210 lb) = more than 85 kg --> 520 mg     Hello Team,  Please call [facility] below to get fax #.    Please fax Stelara Therapy Plan to     [facility] - [address] ([address])  [phone]  [address] [place] [id number]    Please call to confirm receipt and then let pt know via MyC.    Thank you.  Antonio

## 2024-02-08 ENCOUNTER — TELEPHONE (OUTPATIENT)
Dept: GASTROENTEROLOGY | Facility: CLINIC | Age: 34
End: 2024-02-08
Payer: COMMERCIAL

## 2024-02-08 NOTE — TELEPHONE ENCOUNTER
----- Message from Dorie Velazquez sent at 2/7/2024  9:10 PM CST -----  Regarding: STELARA - mono [place],    Please remove the infusion appointment request line from the therapy plan per the 2/6/24 Patient Outreach stating orders have been faxed to  [facility]    Thank you,  Dorie HELLER [facility] - 2nd Floor  [facility] Scheduling   (option 3, then option 2)  [facility] / Palliative / GynOnc Scheduling   (option 5, then option 2)

## 2024-02-09 NOTE — PROGRESS NOTES
Summary: Faxed Continuity of Care       Faxed documents per request.     Faxed:  - Therapy Plan: Ustekinumab  - Standing lab orders   -CBC with Platelets & Differential    -CRP inflammation  -Hepatic Panel     -Quantiferon TB Gold Plus    -Calprotectin Feces     Facility Information:  [facility] - [address] ([address])  [phone]  [address] [facility]  [place] [address]    Information not received re-faxed information along with additional labs ordered to F#1: [phone] and F#2:[phone].    Called and spoke to Marsha to confirm information was received. She was unable to get a hold of nurse to confirm. She will have nurse call writer back.    Confirmed information was received.    Kathe Gutierrez LPN

## 2024-02-13 ENCOUNTER — TELEPHONE (OUTPATIENT)
Dept: GASTROENTEROLOGY | Facility: CLINIC | Age: 34
End: 2024-02-13
Payer: COMMERCIAL

## 2024-02-13 DIAGNOSIS — K51.00 ULCERATIVE PANCOLITIS WITHOUT COMPLICATION (H): Primary | ICD-10-CM

## 2024-02-13 NOTE — PROGRESS NOTES
Kyle called, he checked with Malorie and there was no record of his Therapy plan.    Pt was given below fax #:  F#1: [phone]  F#2:[phone]    Hello Team,  Please re-faxed therapy plan and standing lab orders to above fax #.    Please confirm receipt and let pt know via MyC.    Thank you.  Antonio

## 2024-02-13 NOTE — TELEPHONE ENCOUNTER
adalimumab (HUMIRA *CF* PEN) 40 MG/0.4ML pen kit   2 each 0 1/26/2024     Last Office Visit : 2-6-2024  Future Office visit:  2-    Labs completed on : 1-  HEPATIC FUNCTION PANEL   ESR  0 - 20 mm/hr 1     C-Reactive Protein  0.0 - 0.8 mg/dL <0.2   CBC with Platelets & Differential

## 2024-02-13 NOTE — Clinical Note
Looks like you also wanted the calprotectin and tb faxed. I will fax these and re-fax other information. The TB order was  please sign so I can send that.  Kathe Gutierrez LPN

## 2024-02-14 ENCOUNTER — PATIENT OUTREACH (OUTPATIENT)
Dept: GASTROENTEROLOGY | Facility: CLINIC | Age: 34
End: 2024-02-14
Payer: COMMERCIAL

## 2024-02-14 NOTE — PROGRESS NOTES
Received fax from [facility], they are no longer covering Humira and asking for Hyrimoz.    Hello Team,  Please obtain PA for Hyrimoz 40mg every 2weeks.    Thank you.  Antonio

## 2024-02-14 NOTE — TELEPHONE ENCOUNTER
PA Initiation    Medication: HYRIMOZ 40 MG/0.4ML SC SOAJ  Insurance Company: [employer] - Phone [phone] Fax [phone]  Pharmacy Filling the Rx: [facility] [place]  Filling Pharmacy Phone:    Filling Pharmacy Fax:    Start Date: 2/14/2024  [id number]

## 2024-02-15 RX ORDER — ADALIMUMAB 40MG/0.4ML
KIT SUBCUTANEOUS
Qty: 2 EACH | OUTPATIENT
Start: 2024-02-15

## 2024-02-15 NOTE — TELEPHONE ENCOUNTER
Prior Authorization Approval    Medication: HYRIMOZ 40 MG/0.4ML SC SOAJ  Authorization Effective Date: 2/15/2024  Authorization Expiration Date: 2/15/2025  Approved Dose/Quantity: 0.8/28  Reference #: [id number]   Insurance Company: [employer] - Phone [phone] Fax [phone]  Expected CoPay: $    CoPay Card Available:      Financial Assistance Needed:    Which Pharmacy is filling the prescription: [facility]  Pharmacy Notified:    Patient Notified:  yes

## 2024-02-15 NOTE — TELEPHONE ENCOUNTER
COPAY CARD OBTAINED    Medication: HYRIMOZ 40 MG/0.4ML SC SOAJ  Sponsor: hyrimoz  Member ID:    BIN:    PCN:    Group:    Expected Copay: $ 0  Copay card Monthly Max Amount: $ 1,250  Copay card Annual Amount: $ 15,000  Left message for patient to sign up for copay card at "[employer] cick on support services

## 2024-02-16 ENCOUNTER — TELEPHONE (OUTPATIENT)
Dept: GASTROENTEROLOGY | Facility: CLINIC | Age: 34
End: 2024-02-16

## 2024-02-16 NOTE — TELEPHONE ENCOUNTER
PA Initiation    Medication: USTEKINUMAB 90 MG/ML [facility]  Insurance Company: [email or web address] - Phone [phone] Fax [phone]  Pharmacy [facility]illing the Rx[facility] [place]  Filling Pharmacy Phone:    Filling Pharmacy Fax:    Start Date: 2/16/2024  [id number]

## 2024-02-19 NOTE — PROGRESS NOTES
Spoke with Debbie at [facility], Debbie confirm receipt of therapy plan for Stelara and labs. Pt is scheduled for infusion on 2/26/24.

## 2024-02-19 NOTE — TELEPHONE ENCOUNTER
Prior Authorization Approval    Medication: USTEKINUMAB 90 MG/ML SC SOSY  Authorization Effective Date: 2/16/2024  Authorization Expiration Date: 2/16/2025  Approved Dose/Quantity: 1 per 56 days  Reference #: [id number]   Insurance Company: [employer] - Phone [phone] Fax [phone]  Expected CoPay: $    CoPay Card Available:      Financial Assistance Needed: Co-pay card available  Which Pharmacy is filling the prescription: [facility] [place]  Pharmacy Notified: When RX is sent  Patient Notified: When RX is sent

## 2024-02-21 DIAGNOSIS — K51.00 ULCERATIVE PANCOLITIS WITHOUT COMPLICATION (H): ICD-10-CM

## 2024-02-27 RX ORDER — ADALIMUMAB 40MG/0.4ML
KIT SUBCUTANEOUS
Qty: 2 EACH | Refills: 0 | OUTPATIENT
Start: 2024-02-27

## 2024-02-27 NOTE — TELEPHONE ENCOUNTER
adalimumab (HUMIRA *CF* PEN) 40 MG/0.4ML pen kit   2 each 0 1/26/2024       Refused 1 week ago (2/15/2024):   Formulary Issue (PA in process for alternate medication)

## 2024-02-29 ENCOUNTER — VIRTUAL VISIT (OUTPATIENT)
Dept: GASTROENTEROLOGY | Facility: CLINIC | Age: 34
End: 2024-02-29
Attending: PHYSICIAN ASSISTANT
Payer: COMMERCIAL

## 2024-02-29 ENCOUNTER — TELEPHONE (OUTPATIENT)
Dept: GASTROENTEROLOGY | Facility: CLINIC | Age: 34
End: 2024-02-29
Payer: COMMERCIAL

## 2024-02-29 DIAGNOSIS — K51.00 ULCERATIVE PANCOLITIS WITHOUT COMPLICATION (H): Primary | ICD-10-CM

## 2024-02-29 DIAGNOSIS — L21.9 SEBORRHEIC DERMATITIS: ICD-10-CM

## 2024-02-29 RX ORDER — KETOCONAZOLE 20 MG/ML
SHAMPOO TOPICAL
COMMUNITY
Start: 2024-01-11

## 2024-02-29 RX ORDER — ZOSTER VACCINE RECOMBINANT, ADJUVANTED 50 MCG/0.5
1 KIT INTRAMUSCULAR ONCE
Qty: 0.5 ML | Refills: 1 | Status: SHIPPED | OUTPATIENT
Start: 2024-02-29 | End: 2024-02-29

## 2024-02-29 RX ORDER — PNEUMOCOCCAL 20-VALENT CONJUGATE VACCINE 2.2; 2.2; 2.2; 2.2; 2.2; 2.2; 2.2; 2.2; 2.2; 2.2; 2.2; 2.2; 2.2; 2.2; 2.2; 2.2; 4.4; 2.2; 2.2; 2.2 UG/.5ML; UG/.5ML; UG/.5ML; UG/.5ML; UG/.5ML; UG/.5ML; UG/.5ML; UG/.5ML; UG/.5ML; UG/.5ML; UG/.5ML; UG/.5ML; UG/.5ML; UG/.5ML; UG/.5ML; UG/.5ML; UG/.5ML; UG/.5ML; UG/.5ML; UG/.5ML
0.5 INJECTION, SUSPENSION INTRAMUSCULAR ONCE
Qty: 0.5 ML | Refills: 0 | Status: SHIPPED | OUTPATIENT
Start: 2024-02-29 | End: 2024-02-29

## 2024-02-29 NOTE — PATIENT INSTRUCTIONS
"Recommendations from today's MTM visit:                                                      We will fax an order for a routine hepatitis C screening to [facility].   I will send the order for the Stelara self injection after you complete the IV infusion. Please review the following instructional video on how to administer Stelara. If interested you can discuss arranging a nursing visit to administer your first self-injection. [email or web address]  Kyle will consider the following vaccine:  Prevnar-20 (Pneumococcal)- Rx sent to [facility] in [place]  Shingles vaccine (Shingrix 2-dose series)      Follow-up: MTM Visit in 1 month    It was great speaking with you today.  I value your experience and would be very thankful for your time in providing feedback in our clinic survey. In the next few days, you may receive an email or text message from [employer] with a link to a survey related to your  clinical pharmacist.\"     To schedule another MTM appointment, please call the clinic directly or you may call the MTM scheduling line at [phone] or toll-free at [phone].     My Clinical Pharmacist's contact information:                                                      Please feel free to contact me with any questions or concerns you have.      Archie Oliveros, PharmD, BCPS  MTM Pharmacist   [facility]  Phone: [phone]   "

## 2024-02-29 NOTE — Clinical Note
2/29/2024         RE: Kyle Coelho  [address]  [place] [address]        Dear Colleague,    Thank you for referring your patient, Kyle Coelho, to the [facility]. Please see a copy of my visit note below.    Medication Therapy Management (MTM) Encounter    ASSESSMENT:                            Medication Adherence/Access: {adherencechoices:751317}    Ulcerative Colitis:  ***      PLAN:                            You are due for an annual tuberculosis screening. Please complete this at your earliest convenience at any [facility] lab. Will also add hepatitis B serologies.  Kyle will consider the following vaccine:  Prevnar-20 (Pneumococcal)- Rx sent to [facility] in [facility]     Follow-up: {followuptest2:716595}    EDUCATION:     We reviewed *** today including an overview of the coverage and pharmacy process, mechanism of action, general dosing/administration, side effects (both common/serious), precautions, monitoring for safety and efficacy, as well as time to efficacy. We discussed immunosuppressive precautions including caution with LIVE vaccines unless specifically indicated, and recommendation for indicated non-live/inactivated vaccines. Reviewed potential need to hold medication in the setting of signs/symptoms of infection. Contact information provided in the event they have questions/concerns about the medication.    SUBJECTIVE/OBJECTIVE:                          Kyle Coelho is a 33 year old male { :652307} for {mtmvisit:760484}     Reason for visit: new start Stelara + IBD health maintenance.    Allergies/ADRs: Reviewed in chart  Past Medical History: Reviewed in chart  Tobacco: He reports that he has never smoked. He has never used smokeless tobacco.  Alcohol: Less than 1 beverage / month      Medication Adherence/Access: no issues reported    Ulcerative Colitis:   Stelara- First induction at [facility] in [place]  2024    Infusion scheduled for next Tuesday (3/5).     Last Humira injection was 3-4 weeks ago.     Was on prednisone for 4-6 weeks.     GI symptoms:   In clinical remission    Last provider visit: 2024  Next provider visit:   Last labs completed: 2024  Lab frequency: every 3 months   - standing labs yes hepatic, esr, crp, cbc with platelets & diff  2024  Next labs due: 2024  PDC: 98%    IBD Health Care Maintenance:    Vaccinations:  All patients on biologics should avoid live vaccines unless specifically indicated.    -- Influenza (every year)- recommend annual, reports received -   -- TdaP (every 10 years)- last   -- Pneumococcal Pneumonia    Prevnar-13: not on file   Pneumovax-23: not on file   Prevnar-20: not on file  -- RSV  -- COVID-19 2021  -- Yearly assessment for latent Tb (verbal screening and exam, PPD or QuantiFERON-Tb testing)      result: not done    One time confirmation of immunity or serologies:  -- Hepatitis A (serologies or immunizations)  -- Hepatitis B (serologies or immunizations)  -- Varicella/Zoster    Varicella   Zoster  -- MMR  -- HPV (all aged 18-26)  -- Meningococcal meningitis (all patients at risk for meningitis)--     Due to the immunosuppression in this patient, I would not advise administration of live vaccines such as varicella/VZV, intranasal influenza, MMR, or yellow fever vaccine (if traveling).      Pre-Biologic Screening:  -- Hep B Surface Antibody {ResultsSerologies:468235}  -- Hep B Surface Antigen {ResultsSerologies:289552}  -- Hep B Core Antibody {ResultsSerologies:066331}  -- Hep C Antibody {ResultsSerologies:785560}    Bone mineral density screening   -- Recommend all patients supplement with calcium and vitamin D  -- ***Given prior steroid use recommend DEXA if not already done    Cancer Screening:  Given pancolitis colonoscopy every 1-3 years recommended after 8 years of disease.  Dysplasia screening is recommended , order  placed     Cervical cancer screening: {cervical cancer screenin}    Skin cancer screening: Annual visual exam of skin by dermatologist since patient is immunocompromised-last 10/11/2023    Depression Screening:    PHQ-2 Score:         2024     9:04 AM 2023     9:01 AM   PHQ-2 (  [employer])   Q1: Little interest or pleasure in doing things 0 0   Q2: Feeling down, depressed or hopeless 0 0   PHQ-2 Score 0 0   Q1: Little interest or pleasure in doing things  Not at all   Q2: Feeling down, depressed or hopeless  Not at all   PHQ-2 Score  0        Research:  Are you interested in being contacted about enrollment in clinical research studies? {Yes and No:738243}    Would you like to receive a quarterly newsletter on research via email. {YES/NO:466991}      Misc:  -- Avoid tobacco use  -- Avoid NSAIDs as there is potentially a 25% chance of causing an IBD flare    Supplement:   Ketoconazole 2% shampoo once daily as needed       ----------------  {HERNESTO?:763802}    I spent {mtm total time 3:768247} with this patient today. { :828911}. A copy of the visit note was provided to the patient's provider(s).    A summary of these recommendations {GIVEN/NOT GIVEN:592581}.    ***    Telemedicine Visit Details  Type of service:  Video Conference via [employer]  Start Time:  9:04 AM  End Time:  9:49 AM     Medication Therapy Recommendations  No medication therapy recommendations to display       Medication Therapy Management (MTM) Encounter    ASSESSMENT:                            Medication Adherence/Access: No issues identified    Ulcerative Colitis:  Kyle would benefit from starting treatment with Stelara. His Stelara IV induction dose is scheduled for 3/5/2024. They are up to date on routine maintenance labs. They are up to date on annual tuberculosis screening. They are indicated for additional lab work including routine hepatitis C screening. No access issues for their advanced therapy are present. They are  indicated for a few vaccinations which were recommended to them. Health maintenance was not comprehensively reviewed with this visit due to patient having to end visit early.    Seborrheic dermatitis: Stable.    PLAN:                            We will fax an order for a routine hepatitis C screening to [facility].   I will send the order for the Stelara self injection after you complete the IV infusion. Please review the following instructional video on how to administer Stelara. If interested you can discuss arranging a nursing visit to administer your first self-injection. [email or web address]  Kyle will consider the following vaccine:  Prevnar-20 (Pneumococcal)- Rx sent to [facility] in [place]  Shingles vaccine (Shingrix 2-dose series)      Follow-up: MTM Visit in 1 month    EDUCATION:     We reviewed Stelara today including an overview of the coverage and pharmacy process, mechanism of action, general dosing/administration, side effects (both common/serious), precautions, monitoring for safety and efficacy, as well as time to efficacy. We discussed immunosuppressive precautions including caution with LIVE vaccines unless specifically indicated, and recommendation for indicated non-live/inactivated vaccines. Reviewed potential need to hold medication in the setting of signs/symptoms of infection. Contact information provided in the event they have questions/concerns about the medication.    SUBJECTIVE/OBJECTIVE:                          Kyle Coelho is a 33 year old male contacted via secure video for an initial visit. He was referred to me from Miguel Turner PA-C.      Reason for visit: new start Stelara + IBD health maintenance.    Allergies/ADRs: Reviewed in chart  Past Medical History: Reviewed in chart  Tobacco: He reports that he has never smoked. He has never used smokeless tobacco.  Alcohol: Less than 1  beverage / month      Medication Adherence/Access: no issues reported    Ulcerative Colitis:   Stelara- First induction at [facility] in [place] scheduled 3/5/2024    Infusion was originally scheduled for 24 but he had to reschedule as his quantiferon was not completed. His quantiferon was subsequently drawn and results are pending; previously negative. Has been rescheduled for next Tuesday (3/5).     Last Humira injection was 3-4 weeks ago.       GI symptoms:   In clinical remission    Last provider visit: 2024  Next provider visit:   Last labs completed: 2024  Lab frequency: every 3 months   - standing labs yes hepatic, esr, crp, cbc with platelets & diff  2024  Next labs due: 2024  PDC: 98%    IBD Health Care Maintenance:    Vaccinations:  All patients on biologics should avoid live vaccines unless specifically indicated.    -- Influenza (every year)- recommend annual, reports received -   -- TdaP (every 10 years)- last   -- Pneumococcal Pneumonia    Prevnar-13: not on file   Pneumovax-23: not on file   Prevnar-20: not on file  -- COVID-19 2021, expresses some hesitancy  -- Yearly assessment for latent Tb (verbal screening and exam, PPD or QuantiFERON-Tb testing)      result: 24 results pending; negative 3/6/2020    One time confirmation of immunity or serologies:  -- Hepatitis A (serologies or immunizations) Hep A total non-reactive 2017  -- Hepatitis B (serologies or immunizations)- serology indicates immunity   -- Varicella/Zoster    Varicella- patient reports history of chickenpox   Zoster- not on file  -- MMR- 2002, 1993  -- HPV (all aged 18-26)  -- Meningococcal meningitis (all patients at risk for meningitis)--     Due to the immunosuppression in this patient, I would not advise administration of live vaccines such as varicella/VZV, intranasal influenza, MMR, or yellow fever vaccine (if traveling).      Pre-Biologic Screening:  -- Hep B  Surface Antibody reactive 4/17/2017  -- Hep B Surface Antigen non-reactive 4/17/2017  -- Hep B Core Antibody non-reactive 4/17/2017  -- Hep C Antibody not done    Bone mineral density screening   -- Recommend all patients supplement with calcium and vitamin D  -- Reports prior exposure to prednisone of 4-6 weeks    Cancer Screening:  Given pancolitis colonoscopy every 1-3 years recommended after 8 years of disease.  Dysplasia screening is recommended 2024, order placed     Skin cancer screening: Annual visual exam of skin by dermatologist since patient is immunocompromised-last 10/11/2023    Depression Screening:    PHQ-2 Score:         2/6/2024     9:04 AM 8/29/2023     9:01 AM   PHQ-2 ( 1999 [employer])   Q1: Little interest or pleasure in doing things 0 0   Q2: Feeling down, depressed or hopeless 0 0   PHQ-2 Score 0 0   Q1: Little interest or pleasure in doing things  Not at all   Q2: Feeling down, depressed or hopeless  Not at all   PHQ-2 Score  0        Research:- not addressed with this encounter  Are you interested in being contacted about enrollment in clinical research studies?     Would you like to receive a quarterly newsletter on research via email.       Misc:  -- Avoid tobacco use  -- Avoid NSAIDs as there is potentially a 25% chance of causing an IBD flare    Seborrheic dermatitis:   Ketoconazole 2% shampoo once daily as needed     Currently using 2-3 times per week for maintenance. Uses daily when he has scalp rash.   ----------------      I spent 45 minutes with this patient today. All changes were made via collaborative practice agreement with Miguel Turner PA-C. A copy of the visit note was provided to the patient's provider(s).    A summary of these recommendations was sent via [employer].    Archie Oliveros, PharmD, BCPS  MTM Pharmacist   [facility]  Phone: [phone]    Telemedicine Visit Details  Type of service:  Video Conference via [employer]  Start Time:  9:04 AM  End Time:   9:49 AM     Medication Therapy Recommendations  No medication therapy recommendations to display         Again, thank you for allowing me to participate in the care of your patient.        Sincerely,        Archie Oliveros RPH

## 2024-02-29 NOTE — TELEPHONE ENCOUNTER
Patient requested labs to be sent to their outside lab.     Clinic:     [facility]  Fax: [phone]      Labs faxed:   -Hepatitis C Screen Reflex to HCV RNA Quant and Genotype      Confirmed receipt to the facility?    Kathe Gutierrez LPN

## 2024-02-29 NOTE — TELEPHONE ENCOUNTER
----- Message from Archie Oliveros RPH sent at 2/29/2024 12:53 PM CST -----  Can we fax an order for Hepatitis C screen to [facility] in [place]? He typically gets his labs done there. I've placed an order in his chart.    Archie Oliveros, PharmD, BCPS  MTM Pharmacist   [facility]  Phone: [phone]

## 2024-02-29 NOTE — PROGRESS NOTES
Medication Therapy Management (MTM) Encounter    ASSESSMENT:                            Medication Adherence/Access: No issues identified    Ulcerative Colitis:  Kyle would benefit from starting treatment with Stelara. His Stelara IV induction dose is scheduled for 3/5/2024. They are up to date on routine maintenance labs. They are up to date on annual tuberculosis screening. They are indicated for additional lab work including routine hepatitis C screening. No access issues for their advanced therapy are present. They are indicated for a few vaccinations which were recommended to them. Health maintenance was not comprehensively reviewed with this visit due to patient having to end visit early.    Seborrheic dermatitis: Stable.    PLAN:                            We will fax an order for a routine hepatitis C screening to [facility].   I will send the order for the Stelara self injection after you complete the IV infusion. Please review the following instructional video on how to administer Stelara. If interested you can discuss arranging a nursing visit to administer your first self-injection. [email or web address]  Kyle will consider the following vaccine:  Prevnar-20 (Pneumococcal)- Rx sent to [facility] in [place]  Shingles vaccine (Shingrix 2-dose series)      Follow-up: MTM Visit in 1 month    EDUCATION:     We reviewed Stelara today including an overview of the coverage and pharmacy process, mechanism of action, general dosing/administration, side effects (both common/serious), precautions, monitoring for safety and efficacy, as well as time to efficacy. We discussed immunosuppressive precautions including caution with LIVE vaccines unless specifically indicated, and recommendation for indicated non-live/inactivated vaccines. Reviewed potential need to hold medication in the setting of signs/symptoms of infection.  Contact information provided in the event they have questions/concerns about the medication.    SUBJECTIVE/OBJECTIVE:                          Kyle Coelho is a 33 year old male contacted via secure video for an initial visit. He was referred to me from Miguel Turner PA-C.      Reason for visit: new start Stelara + IBD health maintenance.    Allergies/ADRs: Reviewed in chart  Past Medical History: Reviewed in chart  Tobacco: He reports that he has never smoked. He has never used smokeless tobacco.  Alcohol: Less than 1 beverage / month      Medication Adherence/Access: no issues reported    Ulcerative Colitis:   Stelara- First induction at [facility] in [place] scheduled 3/5/2024    Infusion was originally scheduled for 24 but he had to reschedule as his quantiferon was not completed. His quantiferon was subsequently drawn and results are pending; previously negative. Has been rescheduled for next Tuesday (3/5).     Last Humira injection was 3-4 weeks ago.       GI symptoms:   In clinical remission    Last provider visit: 2024  Next provider visit:   Last labs completed: 2024  Lab frequency: every 3 months   - standing labs yes hepatic, esr, crp, cbc with platelets & diff  2024  Next labs due: 2024  PDC: 98%    IBD Health Care Maintenance:    Vaccinations:  All patients on biologics should avoid live vaccines unless specifically indicated.    -- Influenza (every year)- recommend annual, reports received    -- TdaP (every 10 years)- last   -- Pneumococcal Pneumonia    Prevnar-13: not on file   Pneumovax-23: not on file   Prevnar-20: not on file  -- COVID-19 2021, expresses some hesitancy  -- Yearly assessment for latent Tb (verbal screening and exam, PPD or QuantiFERON-Tb testing)      result: 24 results pending; negative 3/6/2020    One time confirmation of immunity or serologies:  -- Hepatitis A (serologies or immunizations) Hep A total non-reactive 2017  --  Hepatitis B (serologies or immunizations)- serology indicates immunity 2017  -- Varicella/Zoster    Varicella- patient reports history of chickenpox   Zoster- not on file  -- MMR- 6/25/2002, 6/30/1993  -- HPV (all aged 18-26)  -- Meningococcal meningitis (all patients at risk for meningitis)--     Due to the immunosuppression in this patient, I would not advise administration of live vaccines such as varicella/VZV, intranasal influenza, MMR, or yellow fever vaccine (if traveling).      Pre-Biologic Screening:  -- Hep B Surface Antibody reactive 4/17/2017  -- Hep B Surface Antigen non-reactive 4/17/2017  -- Hep B Core Antibody non-reactive 4/17/2017  -- Hep C Antibody not done    Bone mineral density screening   -- Recommend all patients supplement with calcium and vitamin D  -- Reports prior exposure to prednisone of 4-6 weeks    Cancer Screening:  Given pancolitis colonoscopy every 1-3 years recommended after 8 years of disease.  Dysplasia screening is recommended 2024, order placed     Skin cancer screening: Annual visual exam of skin by dermatologist since patient is immunocompromised-last 10/11/2023    Depression Screening:    PHQ-2 Score:         2/6/2024     9:04 AM 8/29/2023     9:01 AM   PHQ-2 ( 1999 Pf[employer])   Q1: Little interest or pleasure in doing things 0 0   Q2: Feeling down, depressed or hopeless 0 0   PHQ-2 Score 0 0   Q1: Little interest or pleasure in doing things  Not at all   Q2: Feeling down, depressed or hopeless  Not at all   PHQ-2 Score  0        Research:- not addressed with this encounter  Are you interested in being contacted about enrollment in clinical research studies?     Would you like to receive a quarterly newsletter on research via email.       Misc:  -- Avoid tobacco use  -- Avoid NSAIDs as there is potentially a 25% chance of causing an IBD flare    Seborrheic dermatitis:   Ketoconazole 2% shampoo once daily as needed     Currently using 2-3 times per week for maintenance. Uses  daily when he has scalp rash.   ----------------      I spent 45 minutes with this patient today. All changes were made via collaborative practice agreement with Miguel Turner PA-C. A copy of the visit note was provided to the patient's provider(s).    A summary of these recommendations was sent via [employer].    Julio SutherlandD, BCPS  [facility] Pharmacist   [facility]  Phone: [phone]    Telemedicine Visit Details  Type of service:  Video Conference via [employer]  Start Time:  9:04 AM  End Time:  9:49 AM     Medication Therapy Recommendations  No medication therapy recommendations to display

## 2024-03-07 ENCOUNTER — TELEPHONE (OUTPATIENT)
Dept: GASTROENTEROLOGY | Facility: CLINIC | Age: 34
End: 2024-03-07
Payer: COMMERCIAL

## 2024-03-07 DIAGNOSIS — K51.00 ULCERATIVE PANCOLITIS WITHOUT COMPLICATION (H): Primary | ICD-10-CM

## 2024-03-07 RX ORDER — USTEKINUMAB 90 MG/ML
INJECTION, SOLUTION SUBCUTANEOUS
Qty: 1 ML | Refills: 5 | Status: SHIPPED | OUTPATIENT
Start: 2024-03-07

## 2024-03-07 NOTE — TELEPHONE ENCOUNTER
----- Message from Archie Oliveros RPH sent at 3/7/2024 12:27 PM CST -----  Regarding: RE: yogi  Rx sent to [facility]. First injection due around 8 weeks from IV induction on 3/5/24, which is 4/30/2024.    YOGI I don't see that he has follow-up scheduled in the GI clinic currently. I can't recall if he wanted injection training- can you reach out to him about this?    Kvgn Oliveros, PharmD, BCPS  MTM Pharmacist   [facility]  Phone: [phone]  ----- Message -----  From: Antonio Bruce, RN  Sent: 3/6/2024   2:09 PM CST  To: Archie Oliveros RPH  Subject: Haydee Dallasroxanne induction infusion dose completed yesterday.    Thank you.  Antonio

## 2024-03-07 NOTE — PROGRESS NOTES
Kyle completed Stelara IV induction 3/5/24. First maintenance injection due 2024. Rx sent with this encounter.     Last provider visit: 24 Johnny  Next provider visit: RTC 6 months  Last labs completed: 24  Lab frequency: every 3 months              - standing labs yes hepatic, esr, crp, cbc with platelets & diff  2024  Next labs due: 2024    Last IBD Health Maintenance Review: 24

## 2024-03-28 ENCOUNTER — DOCUMENTATION ONLY (OUTPATIENT)
Dept: GASTROENTEROLOGY | Facility: CLINIC | Age: 34
End: 2024-03-28
Payer: COMMERCIAL

## 2024-03-28 NOTE — PROGRESS NOTES
Fax came in regarding patients Humira. Formulary change request. Fax sent back that patient changed to Stelara.    Kathe Gutierrez LPN

## 2024-04-03 ENCOUNTER — TELEPHONE (OUTPATIENT)
Dept: GASTROENTEROLOGY | Facility: CLINIC | Age: 34
End: 2024-04-03
Payer: COMMERCIAL

## 2024-04-03 NOTE — TELEPHONE ENCOUNTER
"Endoscopy Scheduling Screen    Have you had a positive Covid test in the last 14 days?  No    What is your communication preference for Instructions and/or Bowel Prep?   MyChart    What insurance is in the chart?  Other:  BCBS    Ordering/Referring Provider: Miguel Turner PA-C      (If ordering provider performs procedure, schedule with ordering provider unless otherwise instructed. )    BMI: Estimated body mass index is 27.71 kg/m  as calculated from the following:    Height as of 2/6/24: 1.854 m (6' 1\").    Weight as of 2/6/24: 95.3 kg (210 lb).     Sedation Ordered  moderate sedation.   If patient BMI > 50 do not schedule in [facility].    If patient BMI > 45 do not schedule at [facility].    Are you taking methadone or Suboxone?  No    Have you had difficulties, pain, or discomfort during past endoscopy procedures?  No    Are you taking any prescription medications for pain 3 or more times per week?   NO, No RN review required.    Do you have a history of malignant hyperthermia?  No    (Females) Are you currently pregnant?   No     Have you been diagnosed or told you have pulmonary hypertension?   No    Do you have an LVAD?  No    Have you been told you have moderate to severe sleep apnea?  No    Have you been told you have COPD, asthma, or any other lung disease?  No    Do you have any heart conditions?  No     Have you ever had or are you waiting for an organ transplant?  No. Continue scheduling, no site restrictions.    Have you had a stroke or transient ischemic attack (TIA aka \"mini stroke\" in the last 6 months?   No    Have you been diagnosed with or been told you have cirrhosis of the liver?   No    Are you currently on dialysis?   No    Do you need assistance transferring?   No    BMI: Estimated body mass index is 27.71 kg/m  as calculated from the following:    Height as of 2/6/24: 1.854 m (6' 1\").    Weight as of 2/6/24: 95.3 kg (210 lb).     Is patients BMI > 40 and scheduling location [facility]?  No    Do you take " an injectable medication for weight loss or diabetes (excluding insulin)?  No    Do you take the medication Naltrexone?  No    Do you take blood thinners?  No       Prep   Are you currently on dialysis or do you have chronic kidney disease?  No    Do you have a diagnosis of diabetes?  No    Do you have a diagnosis of cystic fibrosis (CF)?  No    On a regular basis do you go 3 -5 days between bowel movements?  No    BMI > 40?  No    Preferred Pharmacy:      [facility] [address] [place] [facility] [address]  [address] [facility] [address]  Phone: [phone] Fax: [phone]      Final Scheduling Details     Procedure scheduled  Colonoscopy    Surgeon:  STEVE     Date of procedure:  7/24     Pre-OP / PAC:   No - Not required for this site.    Location  [facility] - [facility] - Per order.    Sedation   Moderate Sedation - Per order.      Patient Reminders:   You will receive a call from a Nurse to review instructions and health history.  This assessment must be completed prior to your procedure.  Failure to complete the Nurse assessment may result in the procedure being cancelled.      On the day of your procedure, please designate an adult(s) who can drive you home stay with you for the next 24 hours. The medicines used in the exam will make you sleepy. You will not be able to drive.      You cannot take public transportation, ride share services, or non-medical taxi service without a responsible caregiver.  Medical transport services are allowed with the requirement that a responsible caregiver will receive you at your destination.  We require that drivers and caregivers are confirmed prior to your procedure.

## 2024-04-22 ENCOUNTER — PATIENT OUTREACH (OUTPATIENT)
Dept: GASTROENTEROLOGY | Facility: CLINIC | Age: 34
End: 2024-04-22
Payer: COMMERCIAL

## 2024-04-22 NOTE — PROGRESS NOTES
Spoke with Kyle, discuss delivery of his first subcutaneous Stelara Medication. Pt states it will be delivered tomorrow. 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 Pt accepted for 4/30/24 at 9am virtual.

## 2024-04-29 ENCOUNTER — VIRTUAL VISIT (OUTPATIENT)
Dept: GASTROENTEROLOGY | Facility: CLINIC | Age: 34
End: 2024-04-29
Attending: PHYSICIAN ASSISTANT
Payer: COMMERCIAL

## 2024-04-29 DIAGNOSIS — K51.00 ULCERATIVE PANCOLITIS WITHOUT COMPLICATION (H): Primary | ICD-10-CM

## 2024-04-29 NOTE — Clinical Note
4/29/2024         RE: Kyle Coelho  [address]  [place] [address]        Dear Colleague,    Thank you for referring your patient, Kyle Coelho, to the [facility]. Please see a copy of my visit note below.    Medication Therapy Management (MTM) Encounter    ASSESSMENT:                            Medication Adherence/Access: {adherencechoices:903692}    Ulcerative Colitis:  ***      PLAN:                            You are due for labs now. These are standing labs ordered under Miguel Turner PA-C. These can be completed at any [facility] lab.  Kyle will consider the following vaccine:  Prevnar-20 (Pneumococcal)- Rx sent to [facility] in [place]  Shingles vaccine (Shingrix 2-dose series)   Start Vitamin D 1000 units once daily. Start calcium 500 mg once daily with food.     Follow-up: {followuptest2:835721}    SUBJECTIVE/OBJECTIVE:                          Kyle Coelho is a 34 year old male { :[id number]} for {mtmvisit:292200}     Reason for visit: Stelara + IBD health maintenance.    Allergies/ADRs: Reviewed in chart  Past Medical History: Reviewed in chart  Tobacco: He reports that he has never smoked. He has never used smokeless tobacco.  Alcohol: Less than 1 beverage / month      Medication Adherence/Access: no issues reported    Ulcerative Colitis:   Stelara 90 mg every 8 weeks    Denies side effects or concerns. Switched from Humira to Stelara in setting of clinical remission of UC but development of psoriasis not controlled with topical therapies. Reports some spots are completely gone. Some new spots including one on neck.     GI symptoms:  No change compared to Humira     Prior authorization status: Stelara 1/56 through 2/16/2025  Original start date: Stelara IV 3/5/2024  Last dose: 3/5/2024  Next dose: 4/30/2024    Last provider visit: 2/6/2024 Johnny  Next provider visit: 7/24/24 Gregg (colonoscopy)  Last labs completed: 1/18/2024  Lab frequency: every 3  months   - standing labs hepatic, esr, crp, cbc with platelets & diff  2024  Next labs due: now    IBD Health Maintenance    Vaccinations:  All patients on biologics should avoid live vaccines unless specifically indicated.    -- Influenza (every year)- recommend annual, reports received    -- TdaP (every 10 years)- last   -- Pneumococcal Pneumonia               Prevnar-13: not on file              Pneumovax-23: not on file              Prevnar-20: not on file  -- COVID-19 2021, expresses some hesitancy    One time confirmation of immunity or serologies:  -- Hepatitis A (serologies or immunizations) Hep A total non-reactive 2017  -- Hepatitis B (serologies or immunizations)- serology indicates immunity   -- Varicella/Zoster               Varicella- patient reports history of chickenpox              Zoster- not on file  -- MMR- 2002, 1993  -- HPV (all aged 18-26)  -- Meningococcal meningitis (all patients at risk for meningitis)--     Due to the immunosuppression in this patient, I would not advise administration of live vaccines such as varicella/VZV, intranasal influenza, MMR, or yellow fever vaccine (if traveling).      Immunosuppressive Screening:  -- Hep B Surface Antibody reactive 2017  -- Hep B Surface Antigen non-reactive 2017  -- Hep B Core Antibody non-reactive 2017  -- Hep C Antibody not done   -- Yearly assessment of TB Negative2024    Lab Results   Component Value Date    TBRES Negative 2020       Bone mineral density screening   -- Recommend all patients supplement with calcium and vitamin D  -- Patient denies cumulative prednisone use >3 months    Cancer Screening:  Colon cancer screening:  Given pancolitis colonoscopy every 1-3 years recommended after 8 years of disease.       Skin cancer screening: Annual visual exam of skin by dermatologist since patient is immunocompromised-last 10/11/2023     Depression Screening:    PHQ-2 Score:          2/6/2024     9:04 AM 8/29/2023     9:01 AM   PHQ-2 ( 1999 Pfizer)   Q1: Little interest or pleasure in doing things 0 0   Q2: Feeling down, depressed or hopeless 0 0   PHQ-2 Score 0 0   Q1: Little interest or pleasure in doing things  Not at all   Q2: Feeling down, depressed or hopeless  Not at all   PHQ-2 Score  0       Research:  Are you interested in being contacted about enrollment in clinical research studies? Yes    Would you like to receive a quarterly newsletter on research via email. No     Misc:  -- Avoid tobacco use  -- Avoid NSAIDs as there is potentially a 25% chance of causing an IBD flare      ----------------      I spent 18 minutes with this patient today. All changes were made via collaborative practice agreement with Miguel Turner PA-C. A copy of the visit note was provided to the patient's provider(s).    A summary of these recommendations was sent via [employer].    Julio SutherlandD, BCPS  MTM Pharmacist   [facility]  Phone: [phone]    Telemedicine Visit Details  Type of service:  Video Conference via [employer]  Start Time:  9:01 AM  End Time:  9:19 AM     Medication Therapy Recommendations  No medication therapy recommendations to display       Medication Therapy Management (MTM) Encounter    ASSESSMENT:                            Medication Adherence/Access: No issues identified    Ulcerative Colitis:  Kyle would benefit from continued treatment with Stelara 90 mg every 8 weeks. They are due for routine maintenance labs. They are up to date on annual tuberculosis screening. They are indicated for additional lab work including routine hepatitis C screening. No access issues for their advanced therapy are present. They are indicated for a few vaccinations which were recommended to them. They are not getting adequate calcium in their diet and supplementation was recommended. Recommend starting Vitamin D 1000 units daily.  He is up to date on routine skin  cancer screenings.      PLAN:                            You are due for labs now, including a routine hepatitis C screening. These are standing labs ordered under Miguel Turner PA-C. These can be completed at any [facility] lab.  Kyle will consider the following vaccine:  Prevnar-20 (Pneumococcal)- Prescription sent to [facility] in [place]  Shingles vaccine (Shingrix 2-dose series)- Prescription sent to [facility] in [place]   Start Vitamin D 1000 units once daily. Start calcium 500 mg once daily with food.     Follow-up: 2024 8:30 AM (virtual)    SUBJECTIVE/OBJECTIVE:                          Kyle Coelho is a 34 year old male called for a follow-up visit.       Reason for visit: Stelara + IBD health maintenance.    Allergies/ADRs: Reviewed in chart  Past Medical History: Reviewed in chart  Tobacco: He reports that he has never smoked. He has never used smokeless tobacco.  Alcohol: Less than 1 beverage / month      Medication Adherence/Access: no issues reported    Ulcerative Colitis:   Stelara 90 mg every 8 weeks    Denies side effects or concerns. Switched from Humira to Stelara in setting of clinical remission of UC but development of psoriasis not controlled with topical therapies. Reports some spots are completely gone. Some new spots including one on neck.     GI symptoms:  No change compared to Humira     Prior authorization status: Stelara  through 2025  Original start date: Stelara IV 3/5/2024  Last dose: 3/5/2024  Next dose: 2024    Last provider visit: 2024 Johnny  Next provider visit: 24 Gregg (colonoscopy)  Last labs completed: 2024  Lab frequency: every 3 months   - standing labs hepatic, esr, crp, cbc with platelets & diff  2024  Next labs due: now    IBD Health Maintenance    Vaccinations:  All patients on biologics should avoid live vaccines unless specifically indicated.    -- Influenza (every year)- recommend annual, reports received    -- TdaP (every  10 years)- last 2015  -- Pneumococcal Pneumonia               Prevnar-13: not on file              Pneumovax-23: not on file              Prevnar-20: not on file  -- COVID-19 5/2021, expresses some hesitancy    One time confirmation of immunity or serologies:  -- Hepatitis A (serologies or immunizations) Hep A total non-reactive 4/17/2017  -- Hepatitis B (serologies or immunizations)- serology indicates immunity 2017  -- Varicella/Zoster               Varicella- patient reports history of chickenpox              Zoster- not on file  -- MMR- 6/25/2002, 6/30/1993  -- HPV (all aged 18-26)  -- Meningococcal meningitis (all patients at risk for meningitis)--     Due to the immunosuppression in this patient, I would not advise administration of live vaccines such as varicella/VZV, intranasal influenza, MMR, or yellow fever vaccine (if traveling).      Immunosuppressive Screening:  -- Hep B Surface Antibody reactive 4/17/2017  -- Hep B Surface Antigen non-reactive 4/17/2017  -- Hep B Core Antibody non-reactive 4/17/2017  -- Hep C Antibody not done   -- Yearly assessment of TB Negative2/28/2024    Lab Results   Component Value Date    TBRES Negative 03/06/2020       Bone mineral density screening   -- Recommend all patients supplement with calcium and vitamin D  -- Patient denies cumulative prednisone use >3 months    Cancer Screening:  Colon cancer screening:  Given pancolitis colonoscopy every 1-3 years recommended after 8 years of disease.       Skin cancer screening: Annual visual exam of skin by dermatologist since patient is immunocompromised-last 10/11/2023     Depression Screening:    PHQ-2 Score:         2/6/2024     9:04 AM 8/29/2023     9:01 AM   PHQ-2 ( 1999 Pfizer)   Q1: Little interest or pleasure in doing things 0 0   Q2: Feeling down, depressed or hopeless 0 0   PHQ-2 Score 0 0   Q1: Little interest or pleasure in doing things  Not at all   Q2: Feeling down, depressed or hopeless  Not at all   PHQ-2 Score  0        Research:  Are you interested in being contacted about enrollment in clinical research studies? Yes    Would you like to receive a quarterly newsletter on research via email. No     Misc:  -- Avoid tobacco use  -- Avoid NSAIDs as there is potentially a 25% chance of causing an IBD flare      ----------------      I spent 18 minutes with this patient today. All changes were made via collaborative practice agreement with Miguel Turner PA-C. A copy of the visit note was provided to the patient's provider(s).    A summary of these recommendations was sent via [employer].    Archie Oliveros, PharmD, BCPS  MTM Pharmacist   [facility]  Phone: [phone]    Telemedicine Visit Details  Type of service:  Video Conference via [employer]  Start Time:  9:01 AM  End Time:  9:19 AM     Medication Therapy Recommendations  No medication therapy recommendations to display         Again, thank you for allowing me to participate in the care of your patient.        Sincerely,        Archie Oliveros RPH

## 2024-04-29 NOTE — PROGRESS NOTES
Medication Therapy Management (MTM) Encounter    ASSESSMENT:                            Medication Adherence/Access: No issues identified    Ulcerative Colitis:  Kyle would benefit from continued treatment with Stelara 90 mg every 8 weeks. They are due for routine maintenance labs. They are up to date on annual tuberculosis screening. They are indicated for additional lab work including routine hepatitis C screening. No access issues for their advanced therapy are present. They are indicated for a few vaccinations which were recommended to them. They are not getting adequate calcium in their diet and supplementation was recommended. Recommend starting Vitamin D 1000 units daily.  He is up to date on routine skin cancer screenings.      PLAN:                            You are due for labs now, including a routine hepatitis C screening. These are standing labs ordered under Miguel Turner PA-C. These can be completed at any [facility] lab.  Kyle will consider the following vaccine:  Prevnar-20 (Pneumococcal)- Prescription sent to [facility] in [place]  Shingles vaccine (Shingrix 2-dose series)- Prescription sent to [facility] in [place]   Start Vitamin D 1000 units once daily. Start calcium 500 mg once daily with food.     Follow-up: 11/4/2024 8:30 AM (virtual)    SUBJECTIVE/OBJECTIVE:                          Kyle Coelho is a 34 year old male called for a follow-up visit.       Reason for visit: Stelara + IBD health maintenance.    Allergies/ADRs: Reviewed in chart  Past Medical History: Reviewed in chart  Tobacco: He reports that he has never smoked. He has never used smokeless tobacco.  Alcohol: Less than 1 beverage / month      Medication Adherence/Access: no issues reported    Ulcerative Colitis:   Stelara 90 mg every 8 weeks    Denies side effects or concerns. Switched from Humira to Stelara in setting of clinical remission of UC but development of psoriasis not controlled with topical therapies. Reports some spots  are completely gone. Some new spots including one on neck.     GI symptoms:  No change compared to Humira     Prior authorization status: Stelara  through 2025  Original start date: Stelara IV 3/5/2024  Last dose: 3/5/2024  Next dose: 2024    Last provider visit: 2024 Johnny  Next provider visit: 24 Gregg (colonoscopy)  Last labs completed: 2024  Lab frequency: every 3 months   - standing labs hepatic, esr, crp, cbc with platelets & diff  2024  Next labs due: now    IBD Health Maintenance    Vaccinations:  All patients on biologics should avoid live vaccines unless specifically indicated.    -- Influenza (every year)- recommend annual, reports received    -- TdaP (every 10 years)- last   -- Pneumococcal Pneumonia               Prevnar-13: not on file              Pneumovax-23: not on file              Prevnar-20: not on file  -- COVID-19 2021, expresses some hesitancy    One time confirmation of immunity or serologies:  -- Hepatitis A (serologies or immunizations) Hep A total non-reactive 2017  -- Hepatitis B (serologies or immunizations)- serology indicates immunity   -- Varicella/Zoster               Varicella- patient reports history of chickenpox              Zoster- not on file  -- MMR- 2002, 1993  -- HPV (all aged 18-26)  -- Meningococcal meningitis (all patients at risk for meningitis)--     Due to the immunosuppression in this patient, I would not advise administration of live vaccines such as varicella/VZV, intranasal influenza, MMR, or yellow fever vaccine (if traveling).      Immunosuppressive Screening:  -- Hep B Surface Antibody reactive 2017  -- Hep B Surface Antigen non-reactive 2017  -- Hep B Core Antibody non-reactive 2017  -- Hep C Antibody not done   -- Yearly assessment of TB Negative2024    Lab Results   Component Value Date    TBRES Negative 2020       Bone mineral density screening   -- Recommend all  patients supplement with calcium and vitamin D  -- Patient denies cumulative prednisone use >3 months    Cancer Screening:  Colon cancer screening:  Given pancolitis colonoscopy every 1-3 years recommended after 8 years of disease.       Skin cancer screening: Annual visual exam of skin by dermatologist since patient is immunocompromised-last 10/11/2023     Depression Screening:    PHQ-2 Score:         2/6/2024     9:04 AM 8/29/2023     9:01 AM   PHQ-2 ( 1999 Pfizer)   Q1: Little interest or pleasure in doing things 0 0   Q2: Feeling down, depressed or hopeless 0 0   PHQ-2 Score 0 0   Q1: Little interest or pleasure in doing things  Not at all   Q2: Feeling down, depressed or hopeless  Not at all   PHQ-2 Score  0       Research:  Are you interested in being contacted about enrollment in clinical research studies? Yes    Would you like to receive a quarterly newsletter on research via email. No     Misc:  -- Avoid tobacco use  -- Avoid NSAIDs as there is potentially a 25% chance of causing an IBD flare      ----------------      I spent 18 minutes with this patient today. All changes were made via collaborative practice agreement with Miguel Turner PA-C. A copy of the visit note was provided to the patient's provider(s).    A summary of these recommendations was sent via [employer].    Archie Oliveros, PharmD, BCPS  MT Pharmacist   [facility]  Phone: [phone]    Telemedicine Visit Details  Type of service:  Video Conference via [employer]  Start Time:  9:01 AM  End Time:  9:19 AM     Medication Therapy Recommendations  No medication therapy recommendations to display

## 2024-04-30 ENCOUNTER — VIRTUAL VISIT (OUTPATIENT)
Dept: GASTROENTEROLOGY | Facility: CLINIC | Age: 34
End: 2024-04-30
Payer: COMMERCIAL

## 2024-04-30 VITALS — BODY MASS INDEX: 27.83 KG/M2 | WEIGHT: 210 LBS | HEIGHT: 73 IN

## 2024-04-30 DIAGNOSIS — K51.00 ULCERATIVE PANCOLITIS WITHOUT COMPLICATION (H): Primary | ICD-10-CM

## 2024-04-30 PROCEDURE — 99207 PR NO BILLABLE SERVICE THIS VISIT: CPT | Mod: 95

## 2024-04-30 ASSESSMENT — PAIN SCALES - GENERAL: PAINLEVEL: NO PAIN (0)

## 2024-04-30 NOTE — NURSING NOTE
Is the patient currently in the state of [place]? YES    Visit mode:VIDEO    If the visit is dropped, the patient can be reconnected by: VIDEO VISIT: Text to cell phone:   Telephone Information:   Mobile [phone]       Will anyone else be joining the visit? NO  (If patient encounters technical issues they should call [phone])    How would you like to obtain your AVS? MyChart    Are changes needed to the allergy or medication list? No    Are refills needed on medications prescribed by this physician? NO    Reason for visit: SUYAPA CHRISTIE

## 2024-04-30 NOTE — PROGRESS NOTES
Patient in virtual visit for a initial stelara injection and injection teaching.    Patient at home.  Teaching provided on medication,  storage of the medications and also proper technique to give a subcutaneous injection.     No questions  regarding stelara injection.    Patient returned demonstrated proper injection technique with practice syringe prior to medication injection.     Verified loading stelara infusion was 8 weeks prior to this visit.      NO signs and symptoms of illness present today prior to injection.      Patient was able to self administer injection and Patient tolerated the injection well, No reaction noted  20 after injection.     Reaction signs and symptoms were discussed with patient and patient is aware that if they were to have any symptoms of anaphylaxis that they should call 911.    Patient will call clinic with any skin changes or injection site reactions.     Patient aware next injection is  in 8 weeks.  Patient  will call for follow up appointment with nurse if they do not feel comfortable administering medication at home by self.

## 2024-04-30 NOTE — PROGRESS NOTES
Virtual Visit Details    Type of service:  Video Visit     Originating Location (pt. Location): Home    Distant Location (provider location):  On-site  Platform used for Video Visit: Salvadro

## 2024-05-03 RX ORDER — PNEUMOCOCCAL 20-VALENT CONJUGATE VACCINE 2.2; 2.2; 2.2; 2.2; 2.2; 2.2; 2.2; 2.2; 2.2; 2.2; 2.2; 2.2; 2.2; 2.2; 2.2; 2.2; 4.4; 2.2; 2.2; 2.2 UG/.5ML; UG/.5ML; UG/.5ML; UG/.5ML; UG/.5ML; UG/.5ML; UG/.5ML; UG/.5ML; UG/.5ML; UG/.5ML; UG/.5ML; UG/.5ML; UG/.5ML; UG/.5ML; UG/.5ML; UG/.5ML; UG/.5ML; UG/.5ML; UG/.5ML; UG/.5ML
0.5 INJECTION, SUSPENSION INTRAMUSCULAR ONCE
Qty: 0.5 ML | Refills: 0 | Status: SHIPPED | OUTPATIENT
Start: 2024-05-03 | End: 2024-05-03

## 2024-05-03 RX ORDER — ZOSTER VACCINE RECOMBINANT, ADJUVANTED 50 MCG/0.5
1 KIT INTRAMUSCULAR ONCE
Qty: 0.5 ML | Refills: 1 | Status: SHIPPED | OUTPATIENT
Start: 2024-05-03 | End: 2024-05-03

## 2024-05-03 NOTE — PATIENT INSTRUCTIONS
"Recommendations from today's MTM visit:                                                      You are due for labs now, including a routine hepatitis C screening. These are standing labs ordered under Miguel Turner PA-C. These can be completed at any [facility] lab.  Kyle will consider the following vaccine:  Prevnar-20 (Pneumococcal)- Prescription sent to [facility] in [place]  Shingles vaccine (Shingrix 2-dose series)- Prescription sent to [facility] in [place]   Start Vitamin D 1000 units once daily. Start calcium 500 mg once daily with food.     Follow-up: 11/4/2024 8:30 AM (virtual)    It was great speaking with you today.  I value your experience and would be very thankful for your time in providing feedback in our clinic survey. In the next few days, you may receive an email or text message from [facility] with a link to a survey related to your  clinical pharmacist.\"     To schedule another MTM appointment, please call the clinic directly or you may call the MTM scheduling line at [phone] or toll-free at [phone].     My Clinical Pharmacist's contact information:                                                      Please feel free to contact me with any questions or concerns you have.      Archie Oliveros, PharmD, BCPS  MTM Pharmacist   [facility]  Phone: [phone]   "

## 2024-05-07 ENCOUNTER — PATIENT OUTREACH (OUTPATIENT)
Dept: GASTROENTEROLOGY | Facility: CLINIC | Age: 34
End: 2024-05-07
Payer: COMMERCIAL

## 2024-05-07 NOTE — PROGRESS NOTES
Received vocera call from Annette GALVEZ at [facility] [email or web address], pt is at the facility getting his lab work and wanting to pick stool collection kit for fcal. Annette is asking for lab orders for Hep C and fcal. RN fax over the orders and gave Annette direct # if she did not get the fax.    Fax#[phone]

## 2024-07-15 ENCOUNTER — TELEPHONE (OUTPATIENT)
Dept: GASTROENTEROLOGY | Facility: CLINIC | Age: 34
End: 2024-07-15
Payer: COMMERCIAL

## 2024-07-15 NOTE — TELEPHONE ENCOUNTER
Pre visit planning completed.      Procedure details:    Patient scheduled for Colonoscopy  on 7/24/24.     Arrival time: 1210. Procedure time 1310    Facility location: [facility]; [address] [place] [address], 5[address] Floor, [place] [address]. Check in location: 5th Floor.    Sedation type: Conscious sedation     Pre op exam needed? N/A    Indication for procedure: ulcerative pancolitis      Chart review:     Electronic implanted devices? No    Recent diagnosis of diverticulitis within the last 6 weeks? No    Diabetic? No      Medication review:    Anticoagulants? No    NSAIDS? No NSAID medications per patient's medication list.  RN will verify with pre-assessment call.    Other medication HOLDING recommendations:  N/A      Prep for procedure:     Bowel prep recommendation: Standard Miralax  Due to: standard bowel prep.    Prep instructions sent via [employer] PF         Rosalinda Patel RN  Endoscopy Procedure Pre Assessment RN  [phone] option 4

## 2024-07-15 NOTE — TELEPHONE ENCOUNTER
Attempted to contact patient in order to complete pre assessment questions.     Patient scheduled for Colonoscopy  on 7/24/24.     No answer. Left message to return call to [phone] option 4    Callback required communication sent via [employer].    Rosalinda Patel RN  Endoscopy Procedure Pre Assessment

## 2024-07-17 NOTE — TELEPHONE ENCOUNTER
Second call attempt to complete pre assessment.     No answer.  Left message to return call to [phone] #4 by next business day prior to 4PM or procedure will be sent to cancel.     Callback required communication sent via [employer].      Rosalinda Patel RN  Endoscopy Procedure Pre Assessment

## 2024-07-17 NOTE — TELEPHONE ENCOUNTER
Pre assessment completed for upcoming procedure.   (Please see previous telephone encounter notes for complete details)    Patient  returned call.       Procedure details:    Arrival time and facility location reviewed.    Pre op exam needed? N/A    Designated  policy reviewed. Instructed to have someone stay 6  hours post procedure.       Medication review:    Medications reviewed. Please see supporting documentation below. Holding recommendations discussed (if applicable).       Prep for procedure:     Procedure prep instructions reviewed.        Any additional information needed:  N/A      Patient  verbalized understanding and had no questions or concerns at this time.      Aurelia Burk RN  Endoscopy Procedure Pre Assessment   [phone] option 4

## 2024-07-24 ENCOUNTER — ANESTHESIA (OUTPATIENT)
Dept: SURGERY | Facility: AMBULATORY SURGERY CENTER | Age: 34
End: 2024-07-24
Payer: COMMERCIAL

## 2024-07-24 ENCOUNTER — HOSPITAL ENCOUNTER (OUTPATIENT)
Facility: AMBULATORY SURGERY CENTER | Age: 34
Discharge: HOME | End: 2024-07-24
Attending: INTERNAL MEDICINE
Payer: COMMERCIAL

## 2024-07-24 ENCOUNTER — ANESTHESIA EVENT (OUTPATIENT)
Dept: SURGERY | Facility: AMBULATORY SURGERY CENTER | Age: 34
End: 2024-07-24
Payer: COMMERCIAL

## 2024-07-24 VITALS
HEIGHT: 73 IN | SYSTOLIC BLOOD PRESSURE: 118 MMHG | BODY MASS INDEX: 28.49 KG/M2 | RESPIRATION RATE: 16 BRPM | OXYGEN SATURATION: 98 % | DIASTOLIC BLOOD PRESSURE: 79 MMHG | TEMPERATURE: 97.1 F | WEIGHT: 215 LBS

## 2024-07-24 VITALS — HEART RATE: 90 BPM

## 2024-07-24 LAB — COLONOSCOPY: NORMAL

## 2024-07-24 PROCEDURE — 88305 TISSUE EXAM BY PATHOLOGIST: CPT | Mod: TC | Performed by: INTERNAL MEDICINE

## 2024-07-24 PROCEDURE — 45380 COLONOSCOPY AND BIOPSY: CPT | Mod: 33 | Performed by: INTERNAL MEDICINE

## 2024-07-24 PROCEDURE — 88305 TISSUE EXAM BY PATHOLOGIST: CPT | Mod: 26 | Performed by: PATHOLOGY

## 2024-07-24 PROCEDURE — 45380 COLONOSCOPY AND BIOPSY: CPT | Performed by: NURSE ANESTHETIST, CERTIFIED REGISTERED

## 2024-07-24 PROCEDURE — 45380 COLONOSCOPY AND BIOPSY: CPT | Performed by: ANESTHESIOLOGY

## 2024-07-24 RX ORDER — FLUMAZENIL 0.1 MG/ML
0.2 INJECTION, SOLUTION INTRAVENOUS
Status: DISCONTINUED | OUTPATIENT
Start: 2024-07-24 | End: 2024-07-25 | Stop reason: HOSPADM

## 2024-07-24 RX ORDER — LIDOCAINE 40 MG/G
CREAM TOPICAL
Status: DISCONTINUED | OUTPATIENT
Start: 2024-07-24 | End: 2024-07-24 | Stop reason: HOSPADM

## 2024-07-24 RX ORDER — PROCHLORPERAZINE MALEATE 10 MG
10 TABLET ORAL EVERY 6 HOURS PRN
Status: DISCONTINUED | OUTPATIENT
Start: 2024-07-24 | End: 2024-07-25 | Stop reason: HOSPADM

## 2024-07-24 RX ORDER — LIDOCAINE HYDROCHLORIDE 20 MG/ML
INJECTION, SOLUTION INFILTRATION; PERINEURAL PRN
Status: DISCONTINUED | OUTPATIENT
Start: 2024-07-24 | End: 2024-07-24

## 2024-07-24 RX ORDER — ONDANSETRON 4 MG/1
4 TABLET, ORALLY DISINTEGRATING ORAL EVERY 6 HOURS PRN
Status: CANCELLED | OUTPATIENT
Start: 2024-07-24

## 2024-07-24 RX ORDER — FLUMAZENIL 0.1 MG/ML
0.2 INJECTION, SOLUTION INTRAVENOUS
Status: CANCELLED | OUTPATIENT
Start: 2024-07-24 | End: 2024-07-25

## 2024-07-24 RX ORDER — ONDANSETRON 2 MG/ML
4 INJECTION INTRAMUSCULAR; INTRAVENOUS EVERY 6 HOURS PRN
Status: CANCELLED | OUTPATIENT
Start: 2024-07-24

## 2024-07-24 RX ORDER — NALOXONE HYDROCHLORIDE 0.4 MG/ML
0.2 INJECTION, SOLUTION INTRAMUSCULAR; INTRAVENOUS; SUBCUTANEOUS
Status: DISCONTINUED | OUTPATIENT
Start: 2024-07-24 | End: 2024-07-25 | Stop reason: HOSPADM

## 2024-07-24 RX ORDER — ONDANSETRON 2 MG/ML
4 INJECTION INTRAMUSCULAR; INTRAVENOUS
Status: DISCONTINUED | OUTPATIENT
Start: 2024-07-24 | End: 2024-07-24 | Stop reason: HOSPADM

## 2024-07-24 RX ORDER — SODIUM CHLORIDE, SODIUM LACTATE, POTASSIUM CHLORIDE, CALCIUM CHLORIDE 600; 310; 30; 20 MG/100ML; MG/100ML; MG/100ML; MG/100ML
INJECTION, SOLUTION INTRAVENOUS CONTINUOUS
Status: DISCONTINUED | OUTPATIENT
Start: 2024-07-24 | End: 2024-07-25 | Stop reason: HOSPADM

## 2024-07-24 RX ORDER — PROPOFOL 10 MG/ML
INJECTION, EMULSION INTRAVENOUS PRN
Status: DISCONTINUED | OUTPATIENT
Start: 2024-07-24 | End: 2024-07-24

## 2024-07-24 RX ORDER — LIDOCAINE 40 MG/G
CREAM TOPICAL
Status: DISCONTINUED | OUTPATIENT
Start: 2024-07-24 | End: 2024-07-25 | Stop reason: HOSPADM

## 2024-07-24 RX ORDER — NALOXONE HYDROCHLORIDE 0.4 MG/ML
0.4 INJECTION, SOLUTION INTRAMUSCULAR; INTRAVENOUS; SUBCUTANEOUS
Status: DISCONTINUED | OUTPATIENT
Start: 2024-07-24 | End: 2024-07-25 | Stop reason: HOSPADM

## 2024-07-24 RX ORDER — ONDANSETRON 4 MG/1
4 TABLET, ORALLY DISINTEGRATING ORAL EVERY 6 HOURS PRN
Status: DISCONTINUED | OUTPATIENT
Start: 2024-07-24 | End: 2024-07-25 | Stop reason: HOSPADM

## 2024-07-24 RX ORDER — ONDANSETRON 2 MG/ML
4 INJECTION INTRAMUSCULAR; INTRAVENOUS EVERY 6 HOURS PRN
Status: DISCONTINUED | OUTPATIENT
Start: 2024-07-24 | End: 2024-07-25 | Stop reason: HOSPADM

## 2024-07-24 RX ORDER — PROCHLORPERAZINE MALEATE 10 MG
10 TABLET ORAL EVERY 6 HOURS PRN
Status: CANCELLED | OUTPATIENT
Start: 2024-07-24

## 2024-07-24 RX ORDER — PROPOFOL 10 MG/ML
INJECTION, EMULSION INTRAVENOUS CONTINUOUS PRN
Status: DISCONTINUED | OUTPATIENT
Start: 2024-07-24 | End: 2024-07-24

## 2024-07-24 RX ORDER — SODIUM CHLORIDE, SODIUM LACTATE, POTASSIUM CHLORIDE, CALCIUM CHLORIDE 600; 310; 30; 20 MG/100ML; MG/100ML; MG/100ML; MG/100ML
INJECTION, SOLUTION INTRAVENOUS CONTINUOUS PRN
Status: DISCONTINUED | OUTPATIENT
Start: 2024-07-24 | End: 2024-07-24

## 2024-07-24 RX ADMIN — LIDOCAINE HYDROCHLORIDE 60 MG: 20 INJECTION, SOLUTION INFILTRATION; PERINEURAL at 13:27

## 2024-07-24 RX ADMIN — PROPOFOL 50 MG: 10 INJECTION, EMULSION INTRAVENOUS at 13:32

## 2024-07-24 RX ADMIN — Medication 200 MCG: at 13:30

## 2024-07-24 RX ADMIN — SODIUM CHLORIDE, SODIUM LACTATE, POTASSIUM CHLORIDE, CALCIUM CHLORIDE: 600; 310; 30; 20 INJECTION, SOLUTION INTRAVENOUS at 13:15

## 2024-07-24 RX ADMIN — EPHEDRINE SULFATE 10 MG: 50 INJECTION, SOLUTION INTRAVENOUS at 13:30

## 2024-07-24 RX ADMIN — PROPOFOL 150 MCG/KG/MIN: 10 INJECTION, EMULSION INTRAVENOUS at 13:27

## 2024-07-24 RX ADMIN — PROPOFOL 30 MG: 10 INJECTION, EMULSION INTRAVENOUS at 13:33

## 2024-07-24 NOTE — H&P
"Kyle Arguelloman  [id number]  male  34 year old      Reason for procedure/surgery: Ulcerative colitis    Patient Active Problem List   Diagnosis    Immunosuppression (H24)    Ulcerative pancolitis without complication (H)       Past Surgical History:    Past Surgical History:   Procedure Laterality Date    COLONOSCOPY  2017    Diagnosed with UC    ORTHOPEDIC SURGERY  2005    Bone tumor - benign       Past Medical History: History reviewed. No pertinent past medical history.    Social History:   Social History     Tobacco Use    Smoking status: Never    Smokeless tobacco: Never   Substance Use Topics    Alcohol use: No       Family History:   Family History   Problem Relation Age of Onset    Diabetes Paternal Grandmother         Type 2    Other Cancer Maternal Grandfather         Blood cancer    Melanoma No family hx of     Skin Cancer No family hx of        Allergies: No Known Allergies    Active Medications:   Current Outpatient Medications   Medication Sig Dispense Refill    ketoconazole (NIZORAL) 2 % external shampoo Apply topically every 72 hours      ustekinumab (STELARA) 90 MG/ML Inject 1 ml ( 90 mg) subcutaneous every 8 weeks 1 mL 5       Systemic Review:   CONSTITUTIONAL: NEGATIVE for fever, chills, change in weight  ENT/MOUTH: NEGATIVE for ear, mouth and throat problems  RESP: NEGATIVE for significant cough or SOB  CV: NEGATIVE for chest pain, palpitations or peripheral edema    Physical Examination:   Vital Signs: BP (!) 145/82   Temp 97.8  F (36.6  C) (Temporal)   Resp 18   Ht 1.854 m (6' 1\")   Wt 97.5 kg (215 lb)   SpO2 97%   BMI 28.37 kg/m    GENERAL: healthy, alert and no distress  NECK: no adenopathy, no asymmetry, masses, or scars  RESP: lungs clear to auscultation - no rales, rhonchi or wheezes  CV: regular rate and rhythm, normal S1 S2, no S3 or S4, no murmur, click or rub, no peripheral edema and peripheral pulses strong  ABDOMEN: soft, nontender, no hepatosplenomegaly, no masses and bowel " sounds normal  MS: no gross musculoskeletal defects noted, no edema    Plan: Appropriate to proceed as scheduled.      Gordo Escobedo MD  7/24/2024    PCP:  Suraj Schmidt

## 2024-07-24 NOTE — ANESTHESIA PREPROCEDURE EVALUATION
"Anesthesia Pre-Procedure Evaluation    Patient: Kyle Coelho   MRN: [id number] : 1990        Procedure : Procedure(s):  Colonoscopy          History reviewed. No pertinent past medical history.   Past Surgical History:   Procedure Laterality Date    COLONOSCOPY  2017    Diagnosed with UC    ORTHOPEDIC SURGERY  2005    Bone tumor - benign      No Known Allergies   Social History     Tobacco Use    Smoking status: Never    Smokeless tobacco: Never   Substance Use Topics    Alcohol use: No      Wt Readings from Last 1 Encounters:   24 97.5 kg (215 lb)        Anesthesia Evaluation            ROS/MED HX  ENT/Pulmonary:       Neurologic:       Cardiovascular:       METS/Exercise Tolerance:     Hematologic:       Musculoskeletal:       GI/Hepatic:     (+)       Inflammatory bowel disease,             Renal/Genitourinary:       Endo:       Psychiatric/Substance Use:       Infectious Disease:       Malignancy:       Other:            Physical Exam    Airway  airway exam normal      Mallampati: II   TM distance: > 3 FB   Neck ROM: full   Mouth opening: > 3 cm    Respiratory Devices and Support         Dental       (+) Completely normal teeth      Cardiovascular          Rhythm and rate: regular and normal     Pulmonary   pulmonary exam normal        breath sounds clear to auscultation           OUTSIDE LABS:  CBC:   Lab Results   Component Value Date    WBC 6.8 2021    WBC 6.4 2021    HGB 14.4 2021    HGB 14.9 2021    HCT 41.7 2021    HCT 42.9 2021     2021     2021     BMP:   Lab Results   Component Value Date     2017    POTASSIUM 3.2 (L) 2017    CHLORIDE 105 2024    CHLORIDE 106 2024    CO2 28 2017    BUN 13 2017    CR 0.99 2017    GLC 78 2017     COAGS: No results found for: \"PTT\", \"INR\", \"FIBR\"  POC: No results found for: \"BGM\", \"HCG\", \"HCGS\"  HEPATIC:   Lab Results   Component Value Date " "   ALBUMIN 4.3 01/21/2022    PROTTOTAL 8.1 01/21/2022    ALT 30 01/21/2022    AST 19 01/21/2022    ALKPHOS 47 01/21/2022    BILITOTAL 0.5 01/21/2022     OTHER:   Lab Results   Component Value Date    LACT 1.2 03/12/2017    ZAIRA 9.4 03/12/2017    CRP <2.9 09/13/2021    SED 6 09/13/2021       Anesthesia Plan    ASA Status:  1    NPO Status:  NPO Appropriate    Anesthesia Type: MAC.     - Reason for MAC: immobility needed, straight local not clinically adequate   Induction: Intravenous.   Maintenance: TIVA.        Consents    Anesthesia Plan(s) and associated risks, benefits, and realistic alternatives discussed. Questions answered and patient/representative(s) expressed understanding.     - Discussed:     - Discussed with:  Patient      - Extended Intubation/Ventilatory Support Discussed: No.      - Patient is DNR/DNI Status: No     Use of blood products discussed: No .     Postoperative Care    Pain management: IV analgesics, Oral pain medications, Multi-modal analgesia.   PONV prophylaxis: Ondansetron (or other 5HT-3), Background Propofol Infusion     Comments:               David Almaguer MD    I have reviewed the pertinent notes and labs in the chart from the past 30 days and (re)examined the patient.  Any updates or changes from those notes are reflected in this note.              # Overweight: Estimated body mass index is 28.37 kg/m  as calculated from the following:    Height as of this encounter: 1.854 m (6' 1\").    Weight as of this encounter: 97.5 kg (215 lb).      "

## 2024-07-24 NOTE — ANESTHESIA CARE TRANSFER NOTE
Patient: Kyle Coelho    Procedure: Procedure(s):  COLONOSCOPY, WITH  BIOPSY       Diagnosis: Ulcerative pancolitis without complication (H) [K51.00]  Diagnosis Additional Information: No value filed.    Anesthesia Type:   MAC     Note:    Oropharynx: oropharynx clear of all foreign objects and spontaneously breathing  Level of Consciousness: drowsy  Oxygen Supplementation: room air    Independent Airway: airway patency satisfactory and stable  Dentition: dentition unchanged  Vital Signs Stable: post-procedure vital signs reviewed and stable  Report to RN Given: handoff report given  Patient transferred to: Phase II  Comments: Report to Phase II RN. Resps easy and reg.   Handoff Report: Identifed the Patient, Identified the Reponsible Provider, Reviewed the pertinent medical history, Discussed the surgical course, Reviewed Intra-OP anesthesia mangement and issues during anesthesia, Set expectations for post-procedure period and Allowed opportunity for questions and acknowledgement of understanding      Vitals:  Vitals Value Taken Time   /74 07/24/24 1350   Temp 36.2  C (97.1  F) 07/24/24 1350   Pulse 75    Resp 18 07/24/24 1350   SpO2 94 % 07/24/24 1350       Electronically Signed By: CHAUNCEY Pollock CRNA  July 24, 2024  1:54 PM

## 2024-07-24 NOTE — ANESTHESIA POSTPROCEDURE EVALUATION
Patient: Kyle Coelho    Procedure: Procedure(s):  COLONOSCOPY, WITH  BIOPSY       Anesthesia Type:  MAC    Note:  Disposition: Outpatient   Postop Pain Control: Uneventful            Sign Out: Well controlled pain   PONV: No   Neuro/Psych: Uneventful            Sign Out: Acceptable/Baseline neuro status   Airway/Respiratory: Uneventful            Sign Out: Acceptable/Baseline resp. status   CV/Hemodynamics: Uneventful            Sign Out: Acceptable CV status   Other NRE: NONE   DID A NON-ROUTINE EVENT OCCUR? No           Last vitals:  Vitals Value Taken Time   /79 07/24/24 1417   Temp 36.2  C (97.1  F) 07/24/24 1350   Pulse     Resp 16 07/24/24 1417   SpO2 98 % 07/24/24 1417       Electronically Signed By: David Almaguer MD  July 24, 2024  5:35 PM

## 2024-07-24 NOTE — ANESTHESIA CARE TRANSFER NOTE
Patient: Kyle Coelho    Procedure: Procedure(s):  COLONOSCOPY, WITH  BIOPSY       Diagnosis: Ulcerative pancolitis without complication (H) [K51.00]  Diagnosis Additional Information: No value filed.    Anesthesia Type:   MAC     Note:  Anesthesia Care Transfer Notewriter  Vitals:  Vitals Value Taken Time   /74 07/24/24 1350   Temp 36.2  C (97.1  F) 07/24/24 1350   Pulse 75    Resp 18 07/24/24 1350   SpO2 94 % 07/24/24 1350       Electronically Signed By: CHAUNCEY Pollock CRNA  July 24, 2024  1:53 PM

## 2024-07-24 NOTE — DISCHARGE INSTRUCTIONS
Discharge Instructions after Colonoscopy      Today you had a Colonoscopy     Activity and Diet  You were given medicine for pain. You may be dizzy or sleepy.  For 24 hours:   Do not drive or use heavy equipment.   Do not make important decisions.   Do not drink any alcohol.  You may return to your normal diet and medicines.    Discomfort   Air was placed in your colon during the exam in order to see it. Walking helps to pass the air.   You may take Tylenol (acetaminophen) for pain unless your doctor has told you not to.  Do not take aspirin or ibuprofen (Advil, Motrin, or other anti-inflammatory  drugs) for _____ days.    Follow-up  ____ We took small tissue samples or polyps to study. Your doctor will call you with the results  within two weeks.    When to call:    Call right away if you have:   Unusual pain in belly or chest pain not relieved with passing air.   More than 1 to 2 Tablespoons of bleeding from your rectum.   Fever above 100.6  F (37.5  C).    If you have severe pain, bleeding, or shortness of breath, go to an emergency room.    If you have questions, call:  Monday to Friday, 8 a.m. to 4:30 p.m.  Central Scheduling Department: [phone]    After hours  Hospital: [phone] (Ask for the GI fellow on call)

## 2024-07-25 LAB
PATH REPORT.COMMENTS IMP SPEC: NORMAL
PATH REPORT.COMMENTS IMP SPEC: NORMAL
PATH REPORT.FINAL DX SPEC: NORMAL
PATH REPORT.GROSS SPEC: NORMAL
PATH REPORT.MICROSCOPIC SPEC OTHER STN: NORMAL
PATH REPORT.RELEVANT HX SPEC: NORMAL
PHOTO IMAGE: NORMAL

## 2024-07-25 RX ORDER — EPHEDRINE SULFATE 50 MG/ML
INJECTION, SOLUTION INTRAVENOUS PRN
Status: DISCONTINUED | OUTPATIENT
Start: 2024-07-24 | End: 2024-07-25

## 2024-09-01 ENCOUNTER — HEALTH MAINTENANCE LETTER (OUTPATIENT)
Age: 34
End: 2024-09-01

## 2024-11-04 ENCOUNTER — VIRTUAL VISIT (OUTPATIENT)
Dept: PHARMACY | Facility: CLINIC | Age: 34
End: 2024-11-04
Attending: INTERNAL MEDICINE
Payer: COMMERCIAL

## 2024-11-04 ENCOUNTER — DOCUMENTATION ONLY (OUTPATIENT)
Dept: GASTROENTEROLOGY | Facility: CLINIC | Age: 34
End: 2024-11-04
Payer: COMMERCIAL

## 2024-11-04 VITALS — WEIGHT: 210 LBS | HEIGHT: 73 IN | BODY MASS INDEX: 27.83 KG/M2

## 2024-11-04 DIAGNOSIS — K51.00 ULCERATIVE PANCOLITIS WITHOUT COMPLICATION (H): Primary | ICD-10-CM

## 2024-11-04 RX ORDER — USTEKINUMAB 90 MG/ML
INJECTION, SOLUTION SUBCUTANEOUS
Qty: 1 ML | Refills: 5 | Status: SHIPPED | OUTPATIENT
Start: 2024-11-04

## 2024-11-04 ASSESSMENT — PAIN SCALES - GENERAL: PAINLEVEL_OUTOF10: NO PAIN (0)

## 2024-11-04 NOTE — PROGRESS NOTES
"Virtual Visit Details    Type of service:  Video Visit     Originating Location (pt. Location): {video visit patient location:104859::\"Home\"}  {PROVIDER LOCATION On-site should be selected for visits conducted from your clinic location or adjoining [facility] hospital, academic office, or other nearby [facility] building. Off-site should be selected for all other provider locations, including home:87[address]}  Distant Location (provider location):  {virtual location provider:073536}  Platform used for Video Visit: {Virtual Visit Platforms:357512::\"Fluential\"}  "

## 2024-11-04 NOTE — PROGRESS NOTES
Standing labs (CBC with platelets & diff, hepatic panel, CRP) and also the hepatitis C screening have been faxed to [facility] on 11/04/2024 @ 11:26 a.m.    Fax: [phone]     Herberth Bullard MA

## 2024-11-04 NOTE — NURSING NOTE
Current patient location: [address]  [place] [address]    Is the patient currently in the state of [place]? YES    Visit mode:VIDEO    If the visit is dropped, the patient can be reconnected by: VIDEO VISIT: Text to cell phone:   Telephone Information:   Mobile [phone]       Will anyone else be joining the visit? NO  (If patient encounters technical issues they should call [phone]    Are changes needed to the allergy or medication list? Pt stated no changes to allergies and Pt stated no med changes    Are refills needed on medications prescribed by this physician? NO    Rooming Documentation:  Not applicable    Reason for visit: SUYAPA CHRISTIE

## 2024-11-04 NOTE — PROGRESS NOTES
Medication Therapy Management (MTM) Encounter    ASSESSMENT:                            Medication Adherence/Access: No issues identified.    Ulcerative Colitis:  Kyle would benefit from continued treatment with Stelara 90 mg every 8 weeks. They are due for routine maintenance labs. They are due for annual tuberculosis screening. They are indicated for additional lab work including routine one-time hepatitis C screening. No access issues for their advanced therapy are present. They are indicated for a few vaccinations which were recommended to them. They are not getting adequate calcium in their diet and supplementation was recommended. Health maintenance was not comprehensively reviewed with this visit. Due to time constraints, I was only able to assess the above with the patient today.    PLAN:                            You are due for labs now, including a routine hepatitis C screening. These are standing labs ordered under Miguel Turner PA-C. I will have these faxed to [facility].   Kyle will consider the following vaccine:  Annual flu shot   Prevnar-20 (Pneumococcal)- Prescription sent to [facility] in [place]  https://www.cdc.gov/pneumococcal/vaccines/index.html  Shingles vaccine (Shingrix 2-dose series)- Prescription sent to [facility] in [place]   Start Vitamin D 1000 units once daily. Start calcium 500 mg once daily with food.    Follow-up: 5/6/2025 at 9:00 AM (video)    SUBJECTIVE/OBJECTIVE:                          Kyle Coelho is a 34 year old male seen for a follow-up visit.       Reason for visit: [facility] follow-up visit.    Allergies/ADRs: Reviewed in chart  Past Medical History: Reviewed in chart  Tobacco: He reports that he has never smoked. He has never used smokeless tobacco.  Alcohol: Less than 1 beverage / month      Medication Adherence/Access: no issues reported.    Ulcerative Colitis:   Stelara 90 mg every 8 weeks    Denies injection site reactions side reactions. Keeps track of injections on a  calendar.    PRO-2 for Ulcerative Colitis    Please select the one best answer for the patient's ability at this time     How would you rate your stool frequency over the last 3 days?    Normal (+0)    How would you rate the severity of your rectal bleeding over the last 3 days?    None: 0 points    3. Over the last week, how would you rate your general well-being?    Generally Well    Score: 0 (do not include question 3 in scoring)  0: Inactive Disease  1-1.5: Remission  6: Active disease and spontaneous bleeding    Specialty medication department: [facility]  Prior authorization status: Stelara 1/56 through 2/16/2025  Original start date: Stelara IV 3/5/2024  Last dose: about a week and a half ago (did not have calendar nearby at time of visit)      Last provider visit: 2/6/2024 TRICIA Turner  Next provider visit: 1/29/2025 TRICIA Turner  Last labs completed: 5/7/2024  Lab frequency: every 3 months   - standing labs hepatic, esr, crp, cbc with platelets & diff   Next labs due: now (standard + hepatitis C screening)    Last IBD Health Maintenance Review: 4/29/2024  PDC: 94%      Vaccinations:  All patients on biologics should avoid live vaccines unless specifically indicated.    -- Influenza (every year)- last 9/2023  -- TdaP (every 10 years)- last 2015  -- Pneumococcal Pneumonia               Prevnar-13: not on file              Pneumovax-23: not on file              Prevnar-20: not on file  -- COVID-19 5/2021, expresses some hesitancy     One time confirmation of immunity or serologies:  -- Hepatitis A (serologies or immunizations) Hep A total non-reactive 4/17/2017  -- Hepatitis B (serologies or immunizations)- serology indicates immunity 2017  -- Varicella/Zoster               Varicella- patient reports history of chickenpox              Zoster- not on file, hold off on Shingles   -- MMR- 6/25/2002, 6/30/1993  -- HPV (all aged 18-26)  -- Meningococcal meningitis (all patients at risk for meningitis)-- 10/2005    "  Due to the immunosuppression in this patient, I would not advise administration of live vaccines such as varicella/VZV, intranasal influenza, MMR, or yellow fever vaccine (if traveling).       Immunosuppressive Screening:  -- Hep B Surface Antibody reactive 4/17/2017  -- Hep B Surface Antigen non-reactive 4/17/2017  -- Hep B Core Antibody non-reactive 4/17/2017  -- Hep C Antibody not done   -- Yearly assessment of TB Negative 2/28/2024    Today's Vitals: Ht 6' 1\" (1.854 m)   Wt 210 lb (95.3 kg)   BMI 27.71 kg/m    ----------------      I spent 15 minutes with this patient today. All changes were made via collaborative practice agreement with Miguel Turner PA-C. A copy of the visit note was provided to the patient's provider(s).    A summary of these recommendations was sent via [employer].    Julio SutherlandD, BCPS  MT Pharmacist   [facility]  Phone: [phone]    Telemedicine Visit Details  The patient's medications can be safely assessed via a telemedicine encounter.  Type of service:  Telephone visit  Originating Location (pt. Location): Home    Distant Location (provider location):  Off-site  Start Time:  8:30 AM  End Time:  8:45 AM     Medication Therapy Recommendations  No medication therapy recommendations to display       "

## 2024-11-04 NOTE — PATIENT INSTRUCTIONS
"Recommendations from today's MTM visit:                                                      You are due for labs now, including a routine hepatitis C screening. These are standing labs ordered under Miguel Turner PA-C. I will have these faxed to [facility].   Kyle will consider the following vaccine:  Annual flu shot   Prevnar-20 (Pneumococcal)- Prescription sent to [facility] in [place]  https://www.cdc.gov/pneumococcal/vaccines/index.html  Shingles vaccine (Shingrix 2-dose series)- Prescription sent to [facility] in [place]   Start Vitamin D 1000 units once daily. Start calcium 500 mg once daily with food.    Follow-up: 5/6/2025 at 9:00 AM (video)    It was great speaking with you today.  I value your experience and would be very thankful for your time in providing feedback in our clinic survey. In the next few days, you may receive an email or text message from [employer] with a link to a survey related to your  clinical pharmacist.\"     To schedule another MTM appointment, please call the clinic directly or you may call the MTM scheduling line at [phone] or toll-free at [phone].     My Clinical Pharmacist's contact information:                                                      Please feel free to contact me with any questions or concerns you have.      Julio SutherlandD, BCPS  MTM Pharmacist   [facility]  Phone: [phone]   "

## 2024-11-11 ENCOUNTER — TELEPHONE (OUTPATIENT)
Dept: GASTROENTEROLOGY | Facility: CLINIC | Age: 34
End: 2024-11-11
Payer: COMMERCIAL

## 2024-11-11 NOTE — TELEPHONE ENCOUNTER
Patient confirmed scheduled appointment:  Date: 2/27/25  Time: 10:40 am  Visit type: return ibd  Provider: Miguel Turner   Location: virtual  Testing/imaging: NA  Additional notes: rescheduled 1/29/25 appointment due to a change in providers schedule. Appointment added to waitlist

## 2025-02-27 ENCOUNTER — VIRTUAL VISIT (OUTPATIENT)
Dept: GASTROENTEROLOGY | Facility: CLINIC | Age: 35
End: 2025-02-27
Payer: COMMERCIAL

## 2025-02-27 VITALS — BODY MASS INDEX: 29.16 KG/M2 | HEIGHT: 73 IN | WEIGHT: 220 LBS

## 2025-02-27 DIAGNOSIS — K51.00 ULCERATIVE PANCOLITIS WITHOUT COMPLICATION (H): Primary | ICD-10-CM

## 2025-02-27 ASSESSMENT — PAIN SCALES - GENERAL: PAINLEVEL_OUTOF10: NO PAIN (0)

## 2025-02-27 NOTE — LETTER
2/27/2025      Kyle Coelho  [address] [place] [address]      Dear Colleague,    Thank you for referring your patient, Kyle Coelho, to the [facility] [place]. Please see a copy of my visit note below.    Virtual Visit Details    Type of service:  Video Visit     Originating Location (pt. Location): Home    Distant Location (provider location):  Off-site  Platform used for Video Visit: [facility]    IBD CLINIC VISIT     CC/REFERRING MD:  Self referred  REASON FOR FOLLOW UP: Ulcerative colitis    ASSESSMENT/PLAN  34 year old male with a history of ulcerative pancolitis.     1.  Ulcerative pancolitis:   Current Medications: Stelara every 8 weeks   Current Clinical Disease Activity: Remission (Maddox score 0)  Last Endoscopic Disease Activity: eMayo 0 7/2024 aside from a very small area in cecum with mild inflammation (also noted on bx)    He has done incredibly well with switching to stelara (from humira) with resolution of his psoriasis. GI wise feeling well. Unclear significance of small area in cecum with mild inflammation. We discussed considering CurQD as an option. He would like to stay the course for now which I think is reasonable.  -- Continue Stelara every 8 weeks  --Labs to include CBC, LFTs, CRP every 3 months.  -- Continue to follow with MTM    2. Dry skin spots: appears to be isolated eczema or psoriasis spots which can actually be caused by humira. Switching to Stelara has allowed for resolution.    3.  Joint pain: Much improved.    IBD dysplasia surveillance: Given pancolitis colonoscopy every 1-3 years recommended after 8 years of disease.  Dysplasia screening is recommended 2026/2027, order placed    Misc:  -- Avoid tobacco use  -- Avoid NSAIDs as there is potentially a 25% chance of causing an IBD flare    RTC 6 months    Miguel Turner PA-C  Division of Gastroenterology, Hepatology and Nutrition  [facility]     IBD HISTORY  Age at  diagnosis: 2017  Extent of disease: Pancolitis  Prior UC surgeries: None  Prior IBD Medications:   -- Lialda - flu like illness with fevers, abdominal pains  -- Apriso - flu like illlness  -- Sulfasalazine - did not control symptoms  -- Adalimumab () q14 days: causes psoriasis    DRUG MONITORING  TPMT enzyme activity: --    6-TGN/6-MMPN levels: --    Biologic concentration:  18 ADA level 12.4, no antibodies (every 14 days, monotherapy)    IBDQ Score Date IBDQ - Total Score  (Higher score better)   2025   9:03 AM 66   2024   9:13 AM 59   10/4/2022   9:02 AM 59   2019   7:22 AM 63   12/3/2018   7:52 AM 63        Proxy-reported      HPI:   Kyle is here today for follow up.   Feeling well. Psoriasis went away after starting stelara (and discontinuing humira).  Feeling well from GI standpoint. 1-2 formed BM daily, formed. No blood or urgency.  No new EIM.    No perianal symptoms. No fevers, chills, or unintentional weight loss.    Moved to [place]. Has 3 year old and 5 year old and 1 year old    Maddox score:   Stool freq: 0 (baseline stools frequency)  Rectal bleedin (None)  PGA: 0 (normal)  Endoscopy: --    Remission: <3   Mild disease: 3-5  Moderate disease: 6-10  Severe disease: >10    ROS:  Complete 10 System ROS performed. All are negative except as documented below, in the HPI, or in patient questionnaire from today's visit.  No fevers or chills  No weight loss  No blurry vision, double vision or change in vision  No sore throat  No lymphadenopathy  No headache, paraesthesias, or weakness in a limb  No shortness of breath or wheezing  No chest pain or pressure  No arthralgias or myalgias  No rashes or skin changes  No odynophagia or dysphagia  No BRBPR, hematochezia, melena  No dysuria, frequency or urgency  No hot/cold intolerance or polyria  No anxiety or depression    Extra intestinal manifestations of IBD:  No uveitis/episcleritis  No aphthous ulcers   No arthritis   No erythema  nodosum/pyoderma gangrenosum.     PERTINENT PAST MEDICAL HISTORY:  See HPI    PREVIOUS SURGERIES:    ALLERGIES:   No Known Allergies    PERTINENT MEDICATIONS:    Current Outpatient Medications:      ketoconazole (NIZORAL) 2 % external shampoo, Apply topically every 72 hours, Disp: , Rfl:      ustekinumab (STELARA) 90 MG/ML, Inject 1 ml ( 90 mg) subcutaneous every 8 weeks, Disp: 1 mL, Rfl: 5    SOCIAL HISTORY:  Social History     Socioeconomic History     Marital status:      Spouse name: Not on file     Number of children: Not on file     Years of education: Not on file     Highest education level: Not on file   Occupational History     Not on file   Tobacco Use     Smoking status: Never     Smokeless tobacco: Never   Vaping Use     Vaping status: Never Used   Substance and Sexual Activity     Alcohol use: No     Drug use: No     Sexual activity: Yes     Partners: Female     Birth control/protection: Condom   Other Topics Concern     Parent/sibling w/ CABG, MI or angioplasty before 65F 55M? No   Social History Narrative     Not on file     Social Drivers of Health     Financial Resource Strain: Low Risk  (11/12/2023)    [facility]    Overall Financial Resource Strain (CARDIA)      Difficulty of Paying Living Expenses: Not hard at all   Food Insecurity: No Food Insecurity (12/3/2024)    Received from [facility]    Hunger Vital Sign      Worried About Running Out of Food in the Last Year: Never true      Ran Out of Food in the Last Year: Never true   Transportation Needs: No Transportation Needs (12/3/2024)    Received from [facility]    PRAPARE - Transportation      Lack of Transportation (Medical): No      Lack of Transportation (Non-Medical): No   Physical Activity: Not on file   Stress: Not on file   Social Connections: Not on file  "  Interpersonal Safety: Not on file   Housing Stability: Low Risk  (12/3/2024)    [facility]    Housing Stability Vital Sign      Unable to Pay for Housing in the Last Year: No      Number of Times Moved in the Last Year: 0      Homeless in the Last Year: No     FAMILY HISTORY:  No CRC, no IBD  Past/family/social history reviewed and no changes    PHYSICAL EXAMINATION:  Constitutional: aaox3, cooperative, pleasant, not dyspneic/diaphoretic, no acute distress  Vitals reviewed: Ht 1.854 m (6' 1\")   Wt 99.8 kg (220 lb)   BMI 29.03 kg/m    Wt:   Wt Readings from Last 2 Encounters:   02/27/25 99.8 kg (220 lb)   11/04/24 95.3 kg (210 lb)      General appearance:  Healthy appearing adult, in no acute distress  Eyes: Sclera anicteric, Pupils round and reactive to light  Ears, nose, mouth and throat: No obvious external lesions of ears and nose, Hearing intact  Neck: Symmetric, No obvious external lesions  Respiratory: Normal respiration, no use of accessory muscles   Skin: No rashes or jaundice   Psychiatric: Oriented to person, place and time, Appropriate mood and affect.       Again, thank you for allowing me to participate in the care of your patient.        Sincerely,        Miguel Turner PA-C    Electronically signed"

## 2025-02-27 NOTE — NURSING NOTE
Current patient location: [address]  [place] [address]    Is the patient currently in the state of [place]? YES    Visit mode: VIDEO    If the visit is dropped, the patient can be reconnected by:VIDEO VISIT: Text to cell phone:   Telephone Information:   Mobile [phone]       Will anyone else be joining the visit? NO  (If patient encounters technical issues they should call [phone]    Are changes needed to the allergy or medication list? No    Are refills needed on medications prescribed by this physician? NO    Rooming Documentation:  Questionnaire(s) completed    Reason for visit: SUYAPA BATESF

## 2025-02-27 NOTE — PATIENT INSTRUCTIONS
It was a pleasure taking care of you today.  I've included a brief summary of our discussion and care plan from today's visit below.  Please review this information with your primary care provider.  ______________________________________________________________________    My recommendations are summarized as follows:  -- Continue stelara every 8 weeks  -- Blood work every 3 months CBC, LFTs, CRP  -- colonoscopy 2026/2027    To learn more about Diet and Nutrition in the setting of IBD, check out some of these resources:  https://www.crohnscolitisfoundation.org/diet-and-nutrition/what-should-i-eat  https://www.nimbal.org/ (mSCD)  https://ntforibd.org/ (SCD, mSCD, Autoimmune protocol, IBD-AID, CDED diets with inclusion/exclusion charts)  https://Roposo.org/ (mediterranean diet)  https://[email or web address]/ibd/ibdaid/ (IBD-AID)    Return to GI Clinic in 6 months to review your progress.    ______________________________________________________________________    How do I schedule labs, imaging studies, or procedures that were ordered in clinic today?     Labs: To schedule lab appointment at the [facility], use my chart or call [phone]. If you have a [facility] lab closer to home where you are regularly seen you can give them a call.     Procedures: If a colonoscopy, upper endoscopy, breath test, esophageal manometry, or pH impedence was ordered today, our endoscopy team will call you to schedule this. If you have not heard from our endoscopy team within a week, please call [phone] to schedule.     Imaging Studies: If you were scheduled for a CT scan, X-ray, MRI, ultrasound, HIDA scan or other imaging study, please call [phone] to have this scheduled.     Referral: If a referral to another specialty was ordered, expect a phone call or follow instructions above. If you have not heard from anyone regarding your referral in a week, please call our clinic to check the status.     Who  do I call with any questions after my visit?  Please be in touch if there are any further questions that arise following today's visit.  There are multiple ways to contact your gastroenterology care team.      During business hours, you may reach a Gastroenterology nurse at [phone]    To schedule or reschedule an appointment, please call [phone].     You can always send a secure message through GoEuro.  GoEuro messages are answered by your nurse or doctor typically within 24 hours.  Please allow extra time on weekends and holidays.      For urgent/emergent questions after business hours, you may reach the on-call GI Fellow by contacting the [facility]  at [phone].     How will I get the results of any tests ordered?    You will receive all of your results.  If you have signed up for [email or web address], any tests ordered at your visit will be available to you after your provider reviews them.  Typically this takes 1-2 weeks.  If there are urgent results that require a change in your care plan, your provider or nurse will call you to discuss the next steps.      What is GoEuro?  GoEuro is a secure way for you to access all of your healthcare records from the [facility].  It is a web based computer program, so you can sign on to it from any location.  It also allows you to send secure messages to your care team.  I recommend signing up for GoEuro access if you have not already done so and are comfortable with using a computer.      How to I schedule a follow-up visit?  If you did not schedule a follow-up visit today, please call [phone] to schedule a follow-up office visit.      Sincerely,      Miguel Turner PA-C  Division of Gastroenterology, Hepatology and Nutrition  [facility]

## 2025-02-27 NOTE — PROGRESS NOTES
Virtual Visit Details    Type of service:  Video Visit     Originating Location (pt. Location): Home    Distant Location (provider location):  Off-site  Platform used for Video Visit: [facility]    IBD CLINIC VISIT     CC/REFERRING MD:  Self referred  REASON FOR FOLLOW UP: Ulcerative colitis    ASSESSMENT/PLAN  34 year old male with a history of ulcerative pancolitis.     1.  Ulcerative pancolitis:   Current Medications: Stelara every 8 weeks   Current Clinical Disease Activity: Remission (Maddox score 0)  Last Endoscopic Disease Activity: eMayo 0 7/2024 aside from a very small area in cecum with mild inflammation (also noted on bx)    He has done incredibly well with switching to stelara (from humira) with resolution of his psoriasis. GI wise feeling well. Unclear significance of small area in cecum with mild inflammation. We discussed considering CurQD as an option. He would like to stay the course for now which I think is reasonable.  -- Continue Stelara every 8 weeks  --Labs to include CBC, LFTs, CRP every 3 months.  -- Continue to follow with MTM    2. Dry skin spots: appears to be isolated eczema or psoriasis spots which can actually be caused by humira. Switching to Stelara has allowed for resolution.    3.  Joint pain: Much improved.    IBD dysplasia surveillance: Given pancolitis colonoscopy every 1-3 years recommended after 8 years of disease.  Dysplasia screening is recommended 2026/2027, order placed    Misc:  -- Avoid tobacco use  -- Avoid NSAIDs as there is potentially a 25% chance of causing an IBD flare    RTC 6 months    Miguel Turner PA-C  Division of Gastroenterology, Hepatology and Nutrition  [facility]     IBD HISTORY  Age at diagnosis: 26, 2017  Extent of disease: Pancolitis  Prior UC surgeries: None  Prior IBD Medications:   -- Lialda - flu like illness with fevers, abdominal pains  -- Apriso - flu like illlness  -- Sulfasalazine - did not control symptoms  -- Adalimumab (2017) q14  days: causes psoriasis    DRUG MONITORING  TPMT enzyme activity: --    6-TGN/6-MMPN levels: --    Biologic concentration:  18 ADA level 12.4, no antibodies (every 14 days, monotherapy)    IBDQ Score Date IBDQ - Total Score  (Higher score better)   2025   9:03 AM 66   2024   9:13 AM 59   10/4/2022   9:02 AM 59   2019   7:22 AM 63   12/3/2018   7:52 AM 63        Proxy-reported      HPI:   Kyle is here today for follow up.   Feeling well. Psoriasis went away after starting stelara (and discontinuing humira).  Feeling well from GI standpoint. 1-2 formed BM daily, formed. No blood or urgency.  No new EIM.    No perianal symptoms. No fevers, chills, or unintentional weight loss.    Moved to [place]. Has 3 year old and 5 year old and 1 year old    Maddox score:   Stool freq: 0 (baseline stools frequency)  Rectal bleedin (None)  PGA: 0 (normal)  Endoscopy: --    Remission: <3   Mild disease: 3-5  Moderate disease: 6-10  Severe disease: >10    ROS:  Complete 10 System ROS performed. All are negative except as documented below, in the HPI, or in patient questionnaire from today's visit.  No fevers or chills  No weight loss  No blurry vision, double vision or change in vision  No sore throat  No lymphadenopathy  No headache, paraesthesias, or weakness in a limb  No shortness of breath or wheezing  No chest pain or pressure  No arthralgias or myalgias  No rashes or skin changes  No odynophagia or dysphagia  No BRBPR, hematochezia, melena  No dysuria, frequency or urgency  No hot/cold intolerance or polyria  No anxiety or depression    Extra intestinal manifestations of IBD:  No uveitis/episcleritis  No aphthous ulcers   No arthritis   No erythema nodosum/pyoderma gangrenosum.     PERTINENT PAST MEDICAL HISTORY:  See HPI    PREVIOUS SURGERIES:    ALLERGIES:   No Known Allergies    PERTINENT MEDICATIONS:    Current Outpatient Medications:     ketoconazole (NIZORAL) 2 % external shampoo, Apply topically every  72 hours, Disp: , Rfl:     ustekinumab (STELARA) 90 MG/ML, Inject 1 ml ( 90 mg) subcutaneous every 8 weeks, Disp: 1 mL, Rfl: 5    SOCIAL HISTORY:  Social History     Socioeconomic History    Marital status:      Spouse name: Not on file    Number of children: Not on file    Years of education: Not on file    Highest education level: Not on file   Occupational History    Not on file   Tobacco Use    Smoking status: Never    Smokeless tobacco: Never   Vaping Use    Vaping status: Never Used   Substance and Sexual Activity    Alcohol use: No    Drug use: No    Sexual activity: Yes     Partners: Female     Birth control/protection: Condom   Other Topics Concern    Parent/sibling w/ CABG, MI or angioplasty before 65F 55M? No   Social History Narrative    Not on file     Social Drivers of Health     Financial Resource Strain: Low Risk  (11/12/2023)    [facility]    Overall Financial Resource Strain (CARDIA)     Difficulty of Paying Living Expenses: Not hard at all   Food Insecurity: No Food Insecurity (12/3/2024)    Received from [facility]    Hunger Vital Sign     Worried About Running Out of Food in the Last Year: Never true     Ran Out of Food in the Last Year: Never true   Transportation Needs: No Transportation Needs (12/3/2024)    [facility]    PRAPARE - Transportation     Lack of Transportation (Medical): No     Lack of Transportation (Non-Medical): No   Physical Activity: Not on file   Stress: Not on file   Social Connections: Not on file   Interpersonal Safety: Not on file   Housing Stability: Low Risk  (12/3/2024)    Received from [facility]    Housing Stability Vital Sign     Unable to Pay for Housing in the Last Year: No     Number of Times Moved in the Last Year: 0     Homeless in the  "Last Year: No     FAMILY HISTORY:  No CRC, no IBD  Past/family/social history reviewed and no changes    PHYSICAL EXAMINATION:  Constitutional: aaox3, cooperative, pleasant, not dyspneic/diaphoretic, no acute distress  Vitals reviewed: Ht 1.854 m (6' 1\")   Wt 99.8 kg (220 lb)   BMI 29.03 kg/m    Wt:   Wt Readings from Last 2 Encounters:   02/27/25 99.8 kg (220 lb)   11/04/24 95.3 kg (210 lb)      General appearance:  Healthy appearing adult, in no acute distress  Eyes: Sclera anicteric, Pupils round and reactive to light  Ears, nose, mouth and throat: No obvious external lesions of ears and nose, Hearing intact  Neck: Symmetric, No obvious external lesions  Respiratory: Normal respiration, no use of accessory muscles   Skin: No rashes or jaundice   Psychiatric: Oriented to person, place and time, Appropriate mood and affect.   "

## 2025-03-03 ENCOUNTER — TELEPHONE (OUTPATIENT)
Dept: GASTROENTEROLOGY | Facility: CLINIC | Age: 35
End: 2025-03-03
Payer: COMMERCIAL

## 2025-03-03 NOTE — TELEPHONE ENCOUNTER
Left Voicemail (1st Attempt) and Sent Mychart (1st Attempt) for the patient to call back and schedule the following:    Appointment type: Return  Provider: GONZALEZ Trimble  Return date: TBD  Specialty phone number: [phone]  Additional appointment(s) needed:   Additonal Notes:     Follow Up in Aug 2025 - per provider req     3/3 AA

## 2025-06-29 DIAGNOSIS — K51.00 ULCERATIVE PANCOLITIS WITHOUT COMPLICATION (H): ICD-10-CM

## 2025-07-02 RX ORDER — USTEKINUMAB 90 MG/ML
INJECTION, SOLUTION SUBCUTANEOUS
Refills: 2 | OUTPATIENT
Start: 2025-07-02

## (undated) DEVICE — KIT ENDO TURNOVER/PROCEDURE CARRY-ON 101822

## (undated) DEVICE — TUBING SUCTION MEDI-VAC 1/4"X20' N620A

## (undated) DEVICE — ENDO FORCEP BX CAPTURA LG SPIKE 2.4MMX230CM G53641

## (undated) DEVICE — SPECIMEN CONTAINER 3OZ W/FORMALIN 59901

## (undated) DEVICE — KIT ENDO FIRST STEP DISINFECTANT 200ML W/POUCH EP-4

## (undated) DEVICE — SOL WATER IRRIG 500ML BOTTLE 2F7113

## (undated) DEVICE — SUCTION MANIFOLD NEPTUNE 2 SYS 1 PORT 702-025-000

## (undated) DEVICE — SNARE CAPIVATOR ROUND COLD SNR BX10 M00561101

## (undated) DEVICE — GOWN IMPERVIOUS 2XL BLUE

## (undated) RX ORDER — EPHEDRINE SULFATE 50 MG/ML
INJECTION, SOLUTION INTRAMUSCULAR; INTRAVENOUS; SUBCUTANEOUS
Status: DISPENSED
Start: 2024-07-24